# Patient Record
Sex: MALE | Race: WHITE | NOT HISPANIC OR LATINO | Employment: FULL TIME | URBAN - METROPOLITAN AREA
[De-identification: names, ages, dates, MRNs, and addresses within clinical notes are randomized per-mention and may not be internally consistent; named-entity substitution may affect disease eponyms.]

---

## 2017-05-15 ENCOUNTER — ALLSCRIPTS OFFICE VISIT (OUTPATIENT)
Dept: OTHER | Facility: OTHER | Age: 37
End: 2017-05-15

## 2017-05-23 ENCOUNTER — ALLSCRIPTS OFFICE VISIT (OUTPATIENT)
Dept: OTHER | Facility: OTHER | Age: 37
End: 2017-05-23

## 2017-05-24 LAB
A/G RATIO (HISTORICAL): 2.1 (CALC) (ref 1–2.5)
ALBUMIN SERPL BCP-MCNC: 4.8 G/DL (ref 3.6–5.1)
ALP SERPL-CCNC: 85 U/L (ref 40–115)
ALT SERPL W P-5'-P-CCNC: 9 U/L (ref 9–46)
AST SERPL W P-5'-P-CCNC: 21 U/L (ref 10–40)
BASOPHILS # BLD AUTO: 0.5 %
BASOPHILS # BLD AUTO: 30 CELLS/UL (ref 0–200)
BILIRUB SERPL-MCNC: 1.2 MG/DL (ref 0.2–1.2)
BUN SERPL-MCNC: 14 MG/DL (ref 7–25)
BUN/CREA RATIO (HISTORICAL): ABNORMAL (CALC) (ref 6–22)
CALCIUM (ADJUSTED FOR ALBUMIN) (HISTORICAL): 9.4 MG/DL (CALC) (ref 8.6–10.2)
CALCIUM SERPL-MCNC: 9.7 MG/DL (ref 8.6–10.3)
CHLORIDE SERPL-SCNC: 103 MMOL/L (ref 98–110)
CHOLEST SERPL-MCNC: 152 MG/DL (ref 125–200)
CHOLEST/HDLC SERPL: 2.4 (CALC)
CO2 SERPL-SCNC: 25 MMOL/L (ref 20–31)
CREAT SERPL-MCNC: 0.84 MG/DL (ref 0.6–1.35)
DEPRECATED RDW RBC AUTO: 12.9 % (ref 11–15)
EGFR AFRICAN AMERICAN (HISTORICAL): 130 ML/MIN/1.73M2
EGFR-AMERICAN CALC (HISTORICAL): 112 ML/MIN/1.73M2
EOSINOPHIL # BLD AUTO: 252 CELLS/UL (ref 15–500)
EOSINOPHIL # BLD AUTO: 4.2 %
GAMMA GLOBULIN (HISTORICAL): 2.3 G/DL (CALC) (ref 1.9–3.7)
GLUCOSE (HISTORICAL): 106 MG/DL (ref 65–99)
HCT VFR BLD AUTO: 47.8 % (ref 38.5–50)
HDLC SERPL-MCNC: 64 MG/DL
HGB BLD-MCNC: 15.9 G/DL (ref 13.2–17.1)
LDL CHOLESTEROL (HISTORICAL): 81 MG/DL (CALC)
LYME IGG/IGM AB (HISTORICAL): <0.9 INDEX
LYMPHOCYTES # BLD AUTO: 1992 CELLS/UL (ref 850–3900)
LYMPHOCYTES # BLD AUTO: 33.2 %
MCH RBC QN AUTO: 31.3 PG (ref 27–33)
MCHC RBC AUTO-ENTMCNC: 33.2 G/DL (ref 32–36)
MCV RBC AUTO: 94.1 FL (ref 80–100)
MONOCYTES # BLD AUTO: 438 CELLS/UL (ref 200–950)
MONOCYTES (HISTORICAL): 7.3 %
NEUTROPHILS # BLD AUTO: 3288 CELLS/UL (ref 1500–7800)
NEUTROPHILS # BLD AUTO: 54.8 %
NON-HDL-CHOL (CHOL-HDL) (HISTORICAL): 88 MG/DL (CALC)
PLATELET # BLD AUTO: 273 THOUSAND/UL (ref 140–400)
PMV BLD AUTO: 8.6 FL (ref 7.5–12.5)
POTASSIUM SERPL-SCNC: 4.5 MMOL/L (ref 3.5–5.3)
RBC # BLD AUTO: 5.08 MILLION/UL (ref 4.2–5.8)
SODIUM SERPL-SCNC: 136 MMOL/L (ref 135–146)
TOTAL PROTEIN (HISTORICAL): 7.1 G/DL (ref 6.1–8.1)
TRIGL SERPL-MCNC: 36 MG/DL
TSH SERPL DL<=0.05 MIU/L-ACNC: 0.58 MIU/L (ref 0.4–4.5)
WBC # BLD AUTO: 6 THOUSAND/UL (ref 3.8–10.8)

## 2017-05-25 ENCOUNTER — GENERIC CONVERSION - ENCOUNTER (OUTPATIENT)
Dept: OTHER | Facility: OTHER | Age: 37
End: 2017-05-25

## 2017-07-25 ENCOUNTER — ALLSCRIPTS OFFICE VISIT (OUTPATIENT)
Dept: OTHER | Facility: OTHER | Age: 37
End: 2017-07-25

## 2017-09-13 ENCOUNTER — ALLSCRIPTS OFFICE VISIT (OUTPATIENT)
Dept: OTHER | Facility: OTHER | Age: 37
End: 2017-09-13

## 2017-09-28 ENCOUNTER — ALLSCRIPTS OFFICE VISIT (OUTPATIENT)
Dept: OTHER | Facility: OTHER | Age: 37
End: 2017-09-28

## 2017-10-19 ENCOUNTER — ALLSCRIPTS OFFICE VISIT (OUTPATIENT)
Dept: OTHER | Facility: OTHER | Age: 37
End: 2017-10-19

## 2017-10-20 NOTE — PROGRESS NOTES
Assessment  1  Acute diarrhea (977 91) (R19 7)    Discussion/Summary    Discussed condition with patient  I suspect he has acute diarrhea from food poisoning but it already appears to be resolving  He is to continue with hydration, brat diet, advancing as tolerated and can continue to take Pepto-Bismol as needed  He is to follow up should condition not completely resolved  We will provide him an out of work excuse today  The treatment plan was reviewed with the patient/guardian  The patient/guardian understands and agrees with the treatment plan      Chief Complaint  Pt c/o diarrhea x 1 day after eating at [facility] last night  History of Present Illness  HPI: Presents for with 1 day history of acute diarrhea  He reports he ate at a [employer] [employer] last night and believes he may have contracted food poisoning  He reports the diarrhea was watery and he was having abdominal cramping but denies any fever, nausea, vomiting  He is feeling a lot better after taking Pepto-Bismol and has been able to consume water, Gatorade, and pretzels  He specifically needs a work excuse  Review of Systems    Constitutional: no fever or chills, feels well, no tiredness, no recent weight loss or weight gain  Cardiovascular: no complaints of slow or fast heart rate, no chest pain, no palpitations, no leg claudication or lower extremity edema  Respiratory: no complaints of shortness of breath, no wheezing or cough, no dyspnea on exertion, no orthopnea or PND  Gastrointestinal: as noted in HPI  Genitourinary: no complaints of dysuria or incontinence, no hesitancy, no nocturia, no genital lesion, no inadequacy of penile erection  Active Problems  1  Abnormal thyroid blood test (794 5) (R94 6)   2  Acute bronchitis, unspecified organism (466 0) (J20 9)   3  Acute bronchitis, unspecified organism (466 0) (J20 9)   4  ADHD, 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)   5   Adjustment disorder with mixed anxiety and depressed mood (309 28) (F43 23)   6  Allergic rhinitis (477 9) (J30 9)   7  Depression (311) (F32 9)   8  Elevated blood pressure reading (796 2) (R03 0)   9  Fatigue, unspecified type (780 79) (R53 83)   10  Lipid screening (V77 91) (Z13 220)   11  Osgood-Schlatter's disease (732 4) (M92 50)   12  Tick bite of multiple sites (919 4,E906 4) [facility])    Past Medical History  1  History of Acute bronchitis, unspecified organism (466 0) (J20 9)   2  History of Acute otitis externa of right ear (380 10) (H60 501)   3  Acute upper respiratory infection (465 9) (J06 9)   4  History of Acute upper respiratory infection (465 9) (J06 9)   5  History of Alcohol withdrawal (291 81) (F10 239)   6  History of Bilateral impacted cerumen (380 4) (H61 23)   7  History of Closed avulsion fracture of distal end of right fibula, initial encounter (824 8)   (S26 153A)   8  History of acute bacterial sinusitis (V12 69) (Z87 09)   9  History of acute sinusitis (V12 69) (Z87 09)   10  History of acute sinusitis (V12 69) (Z87 09)   11  History of acute sinusitis (V12 69) (Z87 09)   12  History of diarrhea (V12 79) (Z87 898)   13  History of gastroenteritis (V12 79) (Z87 19)   14  History of gastroenteritis (V12 79) (Z87 19)   15  History of nausea and vomiting (V12 79) (Z87 898)   16  History of viral gastroenteritis (V12 09) (Z86 19)   17  History of viral gastroenteritis (V12 09) (Z86 19)   18  History of Injury caused by twisting with sudden strenuous movement, initial encounter    (E927 3) (X50 1XXA)   19  History of Injury of right ankle, initial encounter (959 7) (S99 911A)   20  History of Left knee sprain (844 9) (S83 92XA)   21  History of Nasal congestion (478 19) (R09 81)   22  History of Right ankle sprain (845 00) (S93 401A)   23  History of Right foot pain (729 5) (Z31 329)   24  History of Strain of peroneal tendon of right foot (844 8) (H03 840K)    Family History  Family History    1  Family history of Epilepsy   2  Denied: Family history of substance abuse   3  No family history of mental disorder    Social History   · Being A Social Drinker   · Current every day smoker (305 1) (F17 200)   · Denied: History of Drug Use  The social history was reviewed and is unchanged  Surgical History  1  History of Oral Surgery Tooth Extraction    Current Meds   1  Escitalopram Oxalate 10 MG Oral Tablet; Take 1 tablet daily; Therapy: 60GYK6821 to (Evaluate:02Cii4338)  Requested for: 86UJL8843; Last   Rx:52Mbk6588 Ordered    The medication list was reviewed and updated today  Allergies  1  No Known Drug Allergies    Vitals   Recorded: 98IFP6521 03:35PM   Temperature 98 F, Tympanic   Heart Rate 92   Respiration Quality Normal   Respiration 17   Systolic 818, LUE, Sitting   Diastolic 114, LUE, Sitting   Height 5 ft 10 in   Weight 147 lb 8 oz   BMI Calculated 21 16   BSA Calculated 1 83   O2 Saturation 96, RA   Pain Scale 0     Physical Exam    Constitutional   General appearance: No acute distress, well appearing and well nourished  Ears, Nose, Mouth, and Throat   Oropharynx: Normal with no erythema, edema, exudate or lesions  Pulmonary   Respiratory effort: No increased work of breathing or signs of respiratory distress  Auscultation of lungs: Clear to auscultation, equal breath sounds bilaterally, no wheezes, no rales, no rhonci  Cardiovascular   Auscultation of heart: Normal rate and rhythm, normal S1 and S2, without murmurs  Abdomen   Abdomen: Abnormal  -- Positive bowel sounds, soft, nondistended; mild diffuse tenderness to palpation but no rebound, guarding, or rigidity  Liver and spleen: No hepatomegaly or splenomegaly  Lymphatic   Palpation of lymph nodes in neck: No lymphadenopathy  Psychiatric   Orientation to person, place and time: Normal     Mood and affect: Normal     Additional Exam:  Vital signs were reviewed          Results/Data  PHQ-9 Adult Depression Screening 19Oct2017 03:39PM Briana Aguiar Test Name Result Flag Reference   PHQ-9 Adult Depression Score 12     Over the last two weeks, how often have you been bothered by any of the following problems? Little interest or pleasure in doing things: More than half the days - 2  Feeling down, depressed, or hopeless: More than half the days - 2  Trouble falling or staying asleep, or sleeping too much: More than half the days - 2  Feeling tired or having little energy: Several days - 1  Poor appetite or over eating: Several days - 1  Feeling bad about yourself - or that you are a failure or have let yourself or your family down: Several days - 1  Trouble concentrating on things, such as reading the newspaper or watching television: More than half the days - 2  Moving or speaking so slowly that other people could have noticed  Or the opposite -  being so fidgety or restless that you have been moving around a lot more than usual: Several days - 1  Thoughts that you would be better off dead, or of hurting yourself in some way: Not at all - 0   PHQ-9 Adult Depression Screening Positive     PHQ-9 Difficulty Level Somewhat difficult     PHQ-9 Severity Moderate Depression         Future Appointments    Date/Time Provider Specialty Site   11/01/2017 05:15 PM Jacqueline Velasquez DO Family Medicine [facility] [address] [place] [address]     Signatures   Electronically signed by :  Gissel Gonzáles, [facility]; Oct 19 2017  3:50PM EST                       (Author)    Electronically signed by : Saleem Ortega DO; Oct 19 2017  5:29PM EST

## 2017-11-01 ENCOUNTER — ALLSCRIPTS OFFICE VISIT (OUTPATIENT)
Dept: OTHER | Facility: OTHER | Age: 37
End: 2017-11-01

## 2017-11-02 NOTE — PROGRESS NOTES
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)   2  Depression (311) (F32 9)    Plan  ADHD, predominantly inattentive type    · Amphetamine-Dextroamphet ER 15 MG Oral Capsule Extended Release 24 Hour;  TAKE 1 CAPSULE DAILY IN THE MORNING  Depression    · Escitalopram Oxalate 10 MG Oral Tablet; Take 1 tablet daily    Discussion/Summary    Patient's depression is much improved on Lexapro  Will continue him on his current dose to 10 mg  He does have symptoms consistent with ADD  We will start him on 15 mg of Adderall and follow up in 1 month if he is not improved  Chief Complaint  pt here for his f/u for his depression and states the medication is helping but he feels like he is having loss of focus at times  History of Present Illness  Patient was seen for follow-up on his depression  He feels that the Lexapro has helped his mood significantly  He feels less stressed and he is able to handle conflict a much more calm and reasonable way  He does however state that his ADD seems to be still somewhat of a problem  He feels scattered on focused at times and has difficulty completing tasks and staying organized  He was treated years ago with stimulant and never really stuck with the regimen and is unclear as to whether not it was really helpful  Review of Systems    Neurological: as noted in HPI  Active Problems  1  Abnormal thyroid blood test (794 5) (R94 6)   2  ADHD, 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)   3  Adjustment disorder with mixed anxiety and depressed mood (309 28) (F43 23)   4  Depression (311) (F32 9)   5  Elevated blood pressure reading (796 2) (R03 0)   6  Fatigue, unspecified type (780 79) (R53 83)   7  Lipid screening (V77 91) (Z13 220)   8  Osgood-Schlatter's disease (732 4) (M92 50)    Past Medical History  1  History of Acute bronchitis, unspecified organism (466 0) (J20 9)   2  History of Acute otitis externa of right ear (380 10) (H60 501)   3  Acute upper respiratory infection (465 9) (J06 9)   4  History of Acute upper respiratory infection (465 9) (J06 9)   5  History of Alcohol withdrawal (291 81) (F10 239)   6  History of Bilateral impacted cerumen (380 4) (H61 23)   7  History of Closed avulsion fracture of distal end of right fibula, initial encounter (824 8)   (G34 901C)   8  History of acute bacterial sinusitis (V12 69) (Z87 09)   9  History of acute sinusitis (V12 69) (Z87 09)   10  History of acute sinusitis (V12 69) (Z87 09)   11  History of acute sinusitis (V12 69) (Z87 09)   12  History of diarrhea (V12 79) (Z87 898)   13  History of gastroenteritis (V12 79) (Z87 19)   14  History of gastroenteritis (V12 79) (Z87 19)   15  History of nausea and vomiting (V12 79) (Z87 898)   16  History of viral gastroenteritis (V12 09) (Z86 19)   17  History of viral gastroenteritis (V12 09) (Z86 19)   18  History of Injury caused by twisting with sudden strenuous movement, initial encounter    (E927 3) (X50 1XXA)   19  History of Injury of right ankle, initial encounter (959 7) (S99 911A)   20  History of Left knee sprain (844 9) (S83 92XA)   21  History of Nasal congestion (478 19) (R09 81)   22  History of Right ankle sprain (845 00) (S93 401A)   23  History of Right foot pain (729 5) (M79 671)   24  History of Strain of peroneal tendon of right foot (844 8) (P97 354O)    The active problems and past medical history were reviewed and updated today  Surgical History  1  History of Oral Surgery Tooth Extraction    Family History  Family History    1  Denied: Family history of substance abuse   2  No family history of mental disorder    Social History   · Being A Social Drinker   · Current every day smoker (305 1) (F17 200)   · Denied: History of Drug Use    Current Meds   1  Escitalopram Oxalate 10 MG Oral Tablet; Take 1 tablet daily;    Therapy: 35HUW1044 to (Evaluate:36Tub3817)  Requested for: 22QDZ3798; Last   Rx:53Bhr1720 Ordered    The medication list was reviewed and updated today  Allergies  1   No Known Drug Allergies    Vitals  Vital Signs    Recorded: 40DMK8251 05:08PM   Temperature 95 9 F, Tympanic   Heart Rate 80   Pulse Quality Normal   Respiration 18   Systolic 399, RUE, Sitting   Diastolic 252, RUE, Sitting   Height 5 ft 9 in   Weight 152 lb 4 oz   BMI Calculated 22 48   BSA Calculated 1 84   O2 Saturation 97   Pain Scale 0     Signatures   Electronically signed by : Cas Colvin DO; Nov 1 2017  5:34PM EST                       (Author)

## 2017-11-14 ENCOUNTER — ALLSCRIPTS OFFICE VISIT (OUTPATIENT)
Dept: OTHER | Facility: OTHER | Age: 37
End: 2017-11-14

## 2017-11-14 DIAGNOSIS — M79.645 PAIN OF FINGER OF LEFT HAND: ICD-10-CM

## 2018-01-02 ENCOUNTER — ALLSCRIPTS OFFICE VISIT (OUTPATIENT)
Dept: OTHER | Facility: OTHER | Age: 38
End: 2018-01-02

## 2018-01-03 NOTE — PROGRESS NOTES
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)   4  Current every day smoker (305 1) (F17 200)    Plan   Acute sinusitis, recurrence not specified, unspecified location    · Sulfamethoxazole-Trimethoprim 800-160 MG Oral Tablet; TAKE 1 TABLET TWICE    DAILY UNTIL FINISHED with food  ADHD, predominantly inattentive type    · From  Amphetamine-Dextroamphet ER 20 MG Oral Capsule Extended    Release 24 Hour TAKE 1 CAPSULE DAILY FOR ADHD To Amphetamine-Dextroamphet    ER 20 MG Oral Capsule Extended Release 24 Hour TAKE 1 CAPSULE    DAILY FOR ADHD in the AM and 1 CAPSULE in the afternoon PRN    Discussion/Summary      Discussed patient's symptoms with him in detail today  He appears to have sinusitis which I will treat with an oral antibiotic and discussed hydration, rest, OTC cold meds, and observation  Fever care, ER instructions were given to patient  He is to follow up within 5-7 days if not significantly improved  discussed his elevated blood pressures going back multiple visits over the past few years and I believe that his smoking is significantly contributory as well as some other external lifestyle and stress factors but at this point it has gone long enough that we need to strongly consider pharmacotherapy  I would like to definitively rule out a white coat component so I recommended that the patient start having his blood pressure checked at home once daily and to keep a diary for the next month and he is scheduled next month to see Dr Rodolfo Scott for follow-up and this can be discussed at that time  Regarding smoking cessation, once his sinusitis resolves and when he comes back to follow-up, this could be further discussed in detail   We discussed his ADHD and Adderall therapy and I stressed to him that if he is going to be changing his dose in on certain days taking more than what is prescribed, that he needs to discuss any changes with us 1st as he is out of his current prescription and requesting a refill early as result of the days he is taking an extra tablet  We agreed to increase his Adderall to taking 20 mg of the XR formulation once daily in the morning and he was given 15 extra tablets to take an afternoon dose as needed but needs to make the prescription last at least 30 days  This can be discussed further at his follow-up appointment scheduled next month  Possible side effects of new medications were reviewed with the patient/guardian today  The treatment plan was reviewed with the patient/guardian  The patient/guardian understands and agrees with the treatment plan      Chief Complaint   Pt presents with sore throat, cough, aches in shoulder, neck and lower back  He also state his congested(mucus)   symptoms x 5 days  Pt state he took DayQuil , NightQuil and Mucinex  History of Present Illness   HPI: Pt  presents with 5 day history of multiple symptoms including fatigue, ST, HA, body aches, chest congestion, productive cough, PND, nasal congestion, slight nausea yesterday for which he took [address]  Denies fever, V/D  Has taken Mucinex, Dayquil/Nyquil  Denies hx of asthma/allergies  He smokes 1 pack per day  He needs a refill today of his Adderall  He reports there are some days that he does not take it but there are other days where he starts to have trouble focusing later in the day and will take a 2nd dose in the afternoon  He has been thinking about quitting smoking but has not completely decided as far as whether not he is ready currently   He reports that his wife does have a blood pressure cuff and that she can start checking his blood pressure at home has he knows that his last several readings in the office going back to last few years have bee      Review of Systems        Constitutional: as noted in HPI       ENT: as noted in HPI       Cardiovascular: as noted in HPI  Respiratory: as noted in HPI  Gastrointestinal: as noted in HPI  Genitourinary: no complaints of dysuria or incontinence, no hesitancy, no nocturia, no genital lesion, no inadequacy of penile erection  Musculoskeletal: as noted in HPI  Active Problems   1  Abnormal thyroid blood test (794 5) (R94 6)   2  Acute bronchitis, unspecified organism (466 0) (J20 9)   3  ADHD, 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)   4  Adjustment disorder with mixed anxiety and depressed mood (309 28) (F43 23)   5  Depression (311) (F32 9)   6  Elevated blood pressure reading (796 2) (R03 0)   7  Fatigue, unspecified type (780 79) (R53 83)   8  Lipid screening (V77 91) (Z13 220)   9  Osgood-Schlatter's disease (732 4) (M92 50)   10  Pain in thumb joint with movement of left hand (729 5) (Y21 003)    Past Medical History   1  History of Acute bronchitis, unspecified organism (466 0) (J20 9)   2  History of Acute diarrhea (787 91) (R19 7)   3  History of Acute otitis externa of right ear (380 10) (H60 501)   4  Acute upper respiratory infection (465 9) (J06 9)   5  History of Acute upper respiratory infection (465 9) (J06 9)   6  History of Alcohol withdrawal (291 81) (F10 239)   7  History of Bilateral impacted cerumen (380 4) (H61 23)   8  History of Closed avulsion fracture of distal end of right fibula, initial encounter (824 8)     (S82 831A)   9  History of acute bacterial sinusitis (V12 69) (Z87 09)   10  History of acute bronchitis (V12 69) (Z87 09)   11  History of acute bronchitis (V12 69) (Z87 09)   12  History of acute sinusitis (V12 69) (Z87 09)   13  History of acute sinusitis (V12 69) (Z87 09)   14  History of acute sinusitis (V12 69) (Z87 09)   15  History of allergic rhinitis (V12 69) (Z87 09)   16  History of diarrhea (V12 79) (Z87 898)   17  History of gastroenteritis (V12 79) (Z87 19)   18  History of gastroenteritis (V12 79) (Z67 19)   19  History of nausea and vomiting (V12 79) (Z87 898)   20  History of viral gastroenteritis (V12 09) (Z86 19)   21  History of viral gastroenteritis (V12 09) (Z86 19)   22  History of Injury caused by twisting with sudden strenuous movement, initial encounter      (E927 3) (X50 1XXA)   23  History of Injury of right ankle, initial encounter (959 7) (S99 911A)   24  History of Left knee sprain (844 9) (S83 92XA)   25  History of Nasal congestion (478 19) (R09 81)   26  History of Right ankle sprain (845 00) (S93 401A)   27  History of Right foot pain (729 5) (M79 671)   28  History of Strain of peroneal tendon of right foot (844 8) (E81 134C)    Family History   Father    1  Family history of epilepsy (V17 2) (Z82 0)  Family History    2  Denied: Family history of substance abuse   3  No family history of mental disorder    Social History    · Always uses seat belt   · Being A Social Drinker   · Current every day smoker (305 1) (F17 200)   · Daily caffeine consumption, 2-3 servings a day   · Denied: History of Drug Use   · Feels safe at home  The social history was reviewed and is unchanged  Surgical History   1  History of Oral Surgery Tooth Extraction    Current Meds    1  Amphetamine-Dextroamphet ER 20 MG Oral Capsule Extended Release 24 Hour; TAKE     1 CAPSULE DAILY FOR ADHD; Therapy: 00ZMV9797 to (Evaluate:07Jan2018); Last Rx:43Arr0331 Ordered   2  Escitalopram Oxalate 10 MG Oral Tablet; Take 1 tablet daily; Therapy: 28WFE0420 to (Evaluate:30Apr2018)  Requested for: 27EPF0076; Last     Rx:34Ijn0303 Ordered     The medication list was reviewed and updated today  Allergies   1   No Known Drug Allergies    Vitals    Recorded: 02Jan2018 04:34PM Recorded: 47APY8536 04:02PM   Temperature  96 5 F, Tympanic   Heart Rate  112   Pulse Quality  Normal   Respiration Quality  Normal   Respiration  20   Systolic 905 063, LUE, Sitting   Diastolic 500 96, LUE, Sitting   Height  5 ft 9 in   Weight  147 lb 2 oz BMI Calculated  21 73   BSA Calculated  1 81   O2 Saturation  96, RA   Pain Scale  0     Physical Exam        Constitutional      General appearance: No acute distress, well appearing and well nourished  Ears, Nose, Mouth, and Throat      External inspection of ears and nose: Normal        Otoscopic examination: Tympanic membrance translucent with normal light reflex  Canals patent without erythema  Nasal mucosa, septum, and turbinates: Abnormal  -- Bilateral boggy erythematous turbinates  Oropharynx: Abnormal  -- PND and erythema, no exudates  Pulmonary      Respiratory effort: No increased work of breathing or signs of respiratory distress  Auscultation of lungs: Abnormal  -- bilateral diffuse mildly coarse breath sounds with no significant wheezes auscultated  Cardiovascular      Auscultation of heart: Normal rate and rhythm, normal S1 and S2, without murmurs  Lymphatic      Palpation of lymph nodes in neck: No lymphadenopathy  Psychiatric      Orientation to person, place and time: Normal        Mood and affect: Normal        Additional Exam:  Vital signs were reviewed; neck supple  Future Appointments      Date/Time Provider Specialty Site   02/02/2018 04:15 PM Priscilla Neal DO Family [address]     Signatures    Electronically signed by : Servando Astorga, [facility]; Jan 2 2018  5:07PM EST                       (Author)     Electronically signed by :  Mat Francois DO; Jan 2 2018  5:49PM EST                       (Author)

## 2018-01-09 NOTE — RESULT NOTES
Verified Results  * XR ANKLE 3+ VIEW RIGHT [id number] 01:53PM Naomi Guerrero Order Number: [id number]     Test Name Result Flag Reference   XR ANKLE 3+ VW RIGHT (Report)     RIGHT ANKLE     INDICATION: Right ankle pain  Injury     COMPARISON: None     VIEWS: 3; 3 images     FINDINGS:     Probable small avulsion fracture fragment adjacent to the distal right fibula  No degenerative changes  No lytic or blastic lesions are seen  Moderate right ankle and foot soft tissue swelling  IMPRESSION:   Probable small avulsion fracture fragment adjacent to the distal right fibula         ##sigslh##sigslh       Workstation performed: [id number]     Signed by:   Liset Blank MD   9/27/16       Plan  Closed avulsion fracture of distal end of right fibula, initial encounter, Health  Maintenance, Injury of right ankle, initial encounter    · Air cast; Status:Complete;   Done: 74LMY5572

## 2018-01-10 NOTE — MISCELLANEOUS
Message  Return to work or school:   Marisela Stark is under my professional care  He was seen in my office on 11/14/2017   He is able to return to work on  11/15/2017      OUT OF WORK 11/14/2017 & 11/15/2017  Anusha Moore PAC  Signatures   Electronically signed by : Madeline Oshea, ; Nov 14 2017  4:57PM EST                       (Author)    Electronically signed by :  Nora Moe, [facility]; Nov 15 2017  3:05PM EST                       (Author)

## 2018-01-11 NOTE — MISCELLANEOUS
Message  Return to work or school:   Marielos Castillo is under my professional care  He was seen in my office on 5/23/17       Pt was seen in the ofc today may return to work  Júnior Tafoya PA-C  Signatures   Electronically signed by :  Hua Daniels, ; May 23 2017 11:53AM EST                       (Author)

## 2018-01-11 NOTE — RESULT NOTES
Verified Results  (Q) CELIAC DISEASE COMPREHENSIVE PANEL 14Apr2016 12:00AM [facility]     Test Name Result Flag Reference   INTERPRETATION      No serological evidence of celiac disease  tTG IgA may normalize in individuals with celiac disease  who maintain a gluten-free diet  Consider HLA DQ2 and   DQ8 testing to rule out celiac disease  Celiac disease   is extremely rare in the absence of DQ2 or DQ8    TISSUE TRANSGLUTAMINASE$ANTIBODY, IGA 1 U/mL     Value      Interpretation         -----      --------------         <4         No Antibody Detected         > or = 4   Antibody Detected   IMMUNOGLOBULIN A 273 mg/dL       (Q) COMPREHENSIVE METABOLIC PNL W/ADJUSTED CALCIUM 14Apr2016 12:00AM [facility]     Test Name Result Flag Reference   GLUCOSE 86 mg/dL  65-99   Fasting reference interval   UREA NITROGEN (BUN) 19 mg/dL  7-25   CREATININE 0 89 mg/dL  0 60-1 35   eGFR NON-AFR  AMERICAN 110 mL/min/1 73m2  > OR = 60   eGFR AFRICAN AMERICAN 127 mL/min/1 73m2  > OR = 60   BUN/CREATININE RATIO   0-84   NOT APPLICABLE (calc)   SODIUM 137 mmol/L  135-146   POTASSIUM 5 5 mmol/L H 3 5-5 3   CHLORIDE 102 mmol/L     CARBON DIOXIDE 21 mmol/L  19-30   CALCIUM 10 4 mg/dL H 8 6-10 3   CALCIUM (ADJUSTED FOR$ALBUMIN) 9 8 mg/dL (calc)  8 6-10 2   PROTEIN, TOTAL 8 1 g/dL  6 1-8 1   ALBUMIN 5 3 g/dL H 3 6-5 1   GLOBULIN 2 8 g/dL (calc)  1 9-3 7   ALBUMIN/GLOBULIN RATIO 1 9 (calc)  1 0-2 5   BILIRUBIN, TOTAL 1 9 mg/dL H 0 2-1 2   ALKALINE PHOSPHATASE 88 U/L     AST 30 U/L  10-40   ALT 19 U/L  9-46     (Q) TSH, 3RD GENERATION W/REFLEX TO FT4 [id number] 12:00AM [facility]     Test Name Result Flag Reference   T4, FREE 1 2 ng/dL  0 8-1 8   TSH W/REFLEX TO FT4 0 34 mIU/L L 0 40-4 50       Plan  Abnormal thyroid blood test    · (1) TSH WITH FT4 REFLEX; Status:Active; Requested for:[id number];    · (Q) TSI (THYROID STIMULATING IMMUNOGLOBULIN); Status:Active;  Requested  for:[id number];

## 2018-01-13 VITALS
SYSTOLIC BLOOD PRESSURE: 142 MMHG | BODY MASS INDEX: 22.22 KG/M2 | HEART RATE: 100 BPM | OXYGEN SATURATION: 98 % | DIASTOLIC BLOOD PRESSURE: 90 MMHG | WEIGHT: 150 LBS | HEIGHT: 69 IN | TEMPERATURE: 96.9 F

## 2018-01-13 VITALS
HEIGHT: 70 IN | TEMPERATURE: 98 F | RESPIRATION RATE: 17 BRPM | DIASTOLIC BLOOD PRESSURE: 100 MMHG | OXYGEN SATURATION: 96 % | HEART RATE: 92 BPM | WEIGHT: 147.5 LBS | SYSTOLIC BLOOD PRESSURE: 150 MMHG | BODY MASS INDEX: 21.11 KG/M2

## 2018-01-13 NOTE — RESULT NOTES
Verified Results  (Q) CBC (INCLUDES DIFF/PLT) (REFL) [id number] 11:00AM Navi Liptawny     Test Name Result Flag Reference   WHITE BLOOD CELL COUNT 6 0 Thousand/uL  3 8-10 8   RED BLOOD CELL COUNT 5 08 Million/uL  4 20-5 80   HEMOGLOBIN 15 9 g/dL  13 2-17 1   HEMATOCRIT 47 8 %  38 5-50 0   MCV 94 1 fL  80 0-100 0   MCH 31 3 pg  27 0-33 0   MCHC 33 2 g/dL  32 0-36 0   RDW 12 9 %  11 0-15 0   PLATELET COUNT 800 Thousand/uL  140-400   MPV 8 6 fL  7 5-12 5   ABSOLUTE NEUTROPHILS 3288 cells/uL  3760-1547   ABSOLUTE LYMPHOCYTES 1992 cells/uL  850-3900   ABSOLUTE MONOCYTES 438 cells/uL  200-950   ABSOLUTE EOSINOPHILS 252 cells/uL     ABSOLUTE BASOPHILS 30 cells/uL  0-200   NEUTROPHILS 54 8 %     LYMPHOCYTES 33 2 %     MONOCYTES 7 3 %     EOSINOPHILS 4 2 %     BASOPHILS 0 5 %       (Q) COMPREHENSIVE METABOLIC PNL W/ADJUSTED CALCIUM [id number] 11:00AM Ward Campuzano     Test Name Result Flag Reference   GLUCOSE 106 mg/dL H 65-99   Fasting reference interval     For someone without known diabetes, a glucose value  between 100 and 125 mg/dL is consistent with  prediabetes and should be confirmed with a  follow-up test    UREA NITROGEN (BUN) 14 mg/dL  7-25   CREATININE 0 84 mg/dL  0 60-1 35   eGFR NON-AFR   AMERICAN 112 mL/min/1 73m2  > OR = 60   eGFR AFRICAN AMERICAN 130 mL/min/1 73m2  > OR = 60   BUN/CREATININE RATIO   7-72   NOT APPLICABLE (calc)   SODIUM 136 mmol/L  135-146   POTASSIUM 4 5 mmol/L  3 5-5 3   CHLORIDE 103 mmol/L     CARBON DIOXIDE 25 mmol/L  20-31   CALCIUM 9 7 mg/dL  8 6-10 3   CALCIUM (ADJUSTED FOR$ALBUMIN) 9 4 mg/dL (calc)  8 6-10 2   PROTEIN, TOTAL 7 1 g/dL  6 1-8 1   ALBUMIN 4 8 g/dL  3 6-5 1   GLOBULIN 2 3 g/dL (calc)  1 9-3 7   ALBUMIN/GLOBULIN RATIO 2 1 (calc)  1 0-2 5   BILIRUBIN, TOTAL 1 2 mg/dL  0 2-1 2   ALKALINE PHOSPHATASE 85 U/L     AST 21 U/L  10-40   ALT 9 U/L  9-46     (Q) LIPID PANEL WITH REFLEX TO DIRECT LDL [id number] 11:00AM Navi Lipps     Test Name Result Flag Reference CHOLESTEROL, TOTAL 152 mg/dL  125-200   HDL CHOLESTEROL 64 mg/dL  > OR = 40   TRIGLICERIDES 36 mg/dL  <462   LDL-CHOLESTEROL 81 mg/dL (calc)  <130   Desirable range <100 mg/dL for patients with CHD or  diabetes and <70 mg/dL for diabetic patients with  known heart disease  CHOL/HDLC RATIO 2 4 (calc)  < OR = 5 0   NON HDL CHOLESTEROL 88 mg/dL (calc)     Target for non-HDL cholesterol is 30 mg/dL higher than   LDL cholesterol target  (Q) TSH, 3RD GENERATION W/REFLEX TO FT4 [id number] 11:00AM Ward Campuzano     Test Name Result Flag Reference   TSH W/REFLEX TO FT4 0 58 mIU/L  0 40-4 50   NO COLLECTION DATE RECEIVED  WE HAVE USED  THE DATE THE SPECIMEN WAS RECEIVED BY THIS  LABORATORY AS THE COLLECTION DATE  IF THIS  IS INCORRECT, PLEASE CONTACT CLIENT SERVICES  PHONE NUMBER: [phone]     (Q) LYME DISEASE AB, TOTAL W/REFL WB (IGG, IGM) [id number] 11:00AM Ward Campuzano     Test Name Result Flag Reference   LYME AB SCREEN <0 90 index     Index                Interpretation                     -----                --------------                     < 0 90               Negative                     0  90-1 09            Equivocal                     > 1 09               Positive      As recommended by the Food and Drug Administration   (FDA), all samples with positive or equivocal   results in a Borrelia burgdorferi antibody screen  will be tested using a blot method  Positive or   equivocal screening test results should not be   interpreted as truly positive until verified as such   using a supplemental assay (e g , B  burgdorferi blot)  The screening test and/or blot for B  burgdorferi   antibodies may be falsely negative in early stages  of Lyme disease, including the period when erythema   migrans is apparent

## 2018-01-14 VITALS
RESPIRATION RATE: 20 BRPM | TEMPERATURE: 98.1 F | DIASTOLIC BLOOD PRESSURE: 82 MMHG | OXYGEN SATURATION: 96 % | HEIGHT: 70 IN | BODY MASS INDEX: 21.53 KG/M2 | SYSTOLIC BLOOD PRESSURE: 140 MMHG | WEIGHT: 150.38 LBS | HEART RATE: 101 BPM

## 2018-01-14 VITALS
WEIGHT: 148.56 LBS | HEIGHT: 69 IN | BODY MASS INDEX: 22 KG/M2 | OXYGEN SATURATION: 95 % | DIASTOLIC BLOOD PRESSURE: 88 MMHG | RESPIRATION RATE: 18 BRPM | TEMPERATURE: 96.7 F | SYSTOLIC BLOOD PRESSURE: 132 MMHG | HEART RATE: 71 BPM

## 2018-01-14 VITALS
TEMPERATURE: 94.8 F | HEART RATE: 84 BPM | WEIGHT: 150.56 LBS | DIASTOLIC BLOOD PRESSURE: 90 MMHG | BODY MASS INDEX: 21.08 KG/M2 | HEIGHT: 71 IN | OXYGEN SATURATION: 97 % | RESPIRATION RATE: 20 BRPM | SYSTOLIC BLOOD PRESSURE: 140 MMHG

## 2018-01-14 VITALS
HEART RATE: 103 BPM | BODY MASS INDEX: 22.28 KG/M2 | SYSTOLIC BLOOD PRESSURE: 140 MMHG | DIASTOLIC BLOOD PRESSURE: 90 MMHG | WEIGHT: 150.44 LBS | OXYGEN SATURATION: 97 % | RESPIRATION RATE: 18 BRPM | TEMPERATURE: 97.6 F | HEIGHT: 69 IN

## 2018-01-15 VITALS
BODY MASS INDEX: 22.55 KG/M2 | RESPIRATION RATE: 18 BRPM | HEART RATE: 80 BPM | OXYGEN SATURATION: 97 % | TEMPERATURE: 95.9 F | DIASTOLIC BLOOD PRESSURE: 100 MMHG | HEIGHT: 69 IN | SYSTOLIC BLOOD PRESSURE: 144 MMHG | WEIGHT: 152.25 LBS

## 2018-01-15 NOTE — MISCELLANEOUS
Message  Return to work or school:   Moses Sandoval is under my professional care  He was seen in my office on 10/17/16       May return back to work as tolerated  Suggest wearing the right cam boot over the next 2 weeks if doing prolonged standing or walking activities  Followup in 3 weeks  Signatures   Electronically signed by :  CINDI Raymundo ; Oct 17 2016  1:32PM EST                       (Author)

## 2018-01-15 NOTE — MISCELLANEOUS
Message  Return to work or school:   Tila Lam is under my professional care  He was seen in my office on 10/19/17       Out of Work 10/19/17  Omar Hill PA-C        Signatures   Electronically signed by : Iveth Woo, ; Oct 19 2017  3:51PM EST                       (Author)

## 2018-01-22 VITALS
RESPIRATION RATE: 20 BRPM | BODY MASS INDEX: 21.79 KG/M2 | DIASTOLIC BLOOD PRESSURE: 100 MMHG | HEART RATE: 112 BPM | SYSTOLIC BLOOD PRESSURE: 138 MMHG | TEMPERATURE: 96.5 F | HEIGHT: 69 IN | WEIGHT: 147.13 LBS | OXYGEN SATURATION: 96 %

## 2018-01-23 NOTE — MISCELLANEOUS
Message  Return to work or school:   Yenny Byrnes is under my professional care  He was seen in my office on 1/2/18       OUT OF WORK 12/29/17 AND 1/2/18  My Castillo PAC  Signatures   Electronically signed by :  Umberto Kussmaul, [facility]; Josue  3 2018  4:32PM EST                       (Author)

## 2018-02-01 PROBLEM — R03.0 ELEVATED BLOOD PRESSURE READING: Status: ACTIVE | Noted: 2017-05-15

## 2018-02-01 PROBLEM — F32.A DEPRESSION: Status: ACTIVE | Noted: 2017-09-28

## 2018-02-01 RX ORDER — DEXTROAMPHETAMINE SACCHARATE, AMPHETAMINE ASPARTATE MONOHYDRATE, DEXTROAMPHETAMINE SULFATE AND AMPHETAMINE SULFATE 5; 5; 5; 5 MG/1; MG/1; MG/1; MG/1
CAPSULE, EXTENDED RELEASE ORAL
COMMUNITY
Start: 2017-11-01 | End: 2018-02-02 | Stop reason: SDUPTHER

## 2018-02-01 RX ORDER — ESCITALOPRAM OXALATE 10 MG/1
1 TABLET ORAL DAILY
COMMUNITY
Start: 2017-09-28 | End: 2018-05-02

## 2018-02-01 RX ORDER — SULFAMETHOXAZOLE AND TRIMETHOPRIM 800; 160 MG/1; MG/1
1 TABLET ORAL
COMMUNITY
Start: 2018-01-02 | End: 2018-02-02

## 2018-02-02 ENCOUNTER — OFFICE VISIT (OUTPATIENT)
Dept: FAMILY MEDICINE CLINIC | Facility: CLINIC | Age: 38
End: 2018-02-02
Payer: COMMERCIAL

## 2018-02-02 VITALS
DIASTOLIC BLOOD PRESSURE: 100 MMHG | WEIGHT: 151.8 LBS | HEART RATE: 97 BPM | TEMPERATURE: 98 F | RESPIRATION RATE: 17 BRPM | HEIGHT: 69 IN | OXYGEN SATURATION: 98 % | BODY MASS INDEX: 22.48 KG/M2 | SYSTOLIC BLOOD PRESSURE: 156 MMHG

## 2018-02-02 DIAGNOSIS — I10 ESSENTIAL HYPERTENSION: Primary | ICD-10-CM

## 2018-02-02 DIAGNOSIS — F90.0 ATTENTION DEFICIT HYPERACTIVITY DISORDER (ADHD), PREDOMINANTLY INATTENTIVE TYPE: ICD-10-CM

## 2018-02-02 PROCEDURE — 99214 OFFICE O/P EST MOD 30 MIN: CPT | Performed by: FAMILY MEDICINE

## 2018-02-02 RX ORDER — LISINOPRIL 10 MG/1
10 TABLET ORAL DAILY
Qty: 30 TABLET | Refills: 3 | Status: SHIPPED | OUTPATIENT
Start: 2018-02-02 | End: 2018-05-02 | Stop reason: SDUPTHER

## 2018-02-02 RX ORDER — DEXTROAMPHETAMINE SACCHARATE, AMPHETAMINE ASPARTATE MONOHYDRATE, DEXTROAMPHETAMINE SULFATE AND AMPHETAMINE SULFATE 5; 5; 5; 5 MG/1; MG/1; MG/1; MG/1
20 CAPSULE, EXTENDED RELEASE ORAL 2 TIMES DAILY
Qty: 60 CAPSULE | Refills: 0 | Status: SHIPPED | OUTPATIENT
Start: 2018-02-02 | End: 2018-03-05 | Stop reason: SDUPTHER

## 2018-02-02 NOTE — PROGRESS NOTES
Assessment/Plan:    ADHD, predominantly inattentive type  Patient is a TD is generally well controlled on 20 milligrams a day though he does on some occasions require 2nd dosage  We will prescribe him sufficient amount that he can take a 2nd dosage if in when he should needed  Essential hypertension  Mildly elevated blood pressure  Will start lisinopril 10 milligrams  Return to the office in 1 month for blood pressure recheck    Depression  Unable to tolerate Lexapro due to side effects  Will decrease to 5 milligrams for next 2 weeks and then discontinue  Will reassess at his return visit in 1 month as to whether another medication may be appropriate  Diagnoses and all orders for this visit:    Essential hypertension  -     lisinopril (ZESTRIL) 10 mg tablet; Take 1 tablet (10 mg total) by mouth daily    Attention deficit hyperactivity disorder (ADHD), predominantly inattentive type  -     amphetamine-dextroamphetamine (ADDERALL XR) 20 MG 24 hr capsule; Take 1 capsule (20 mg total) by mouth 2 (two) times a day Max Daily Amount: 40 mg          Subjective:      Patient ID: Maria Dolores Bonds is a 44 y o  male  Patient presents in follow-up for his ADD and depression  Patient relates that since being on the Lexapro which was started approximately 5 months ago he has experienced issues with erectile dysfunction and delayed ejaculation  [employer] He is also not sure how much benefit he has gotten with his mood  He is also being followed for ADD   He was originally of Adderall XR which was increased to 20 milligrams  More recently however he expressed concerns that he still had days where late in the day he had loss of focus  Because of that he was prescribed additional medication so he could take a 2nd dose 20 milligrams on an as needed basis on days where he worked later or had more demands late in the day  He felt that this was effective                The following portions of the patient's history were reviewed and updated as appropriate: allergies, current medications, past family history, past medical history, past social history, past surgical history and problem list     Review of Systems   Constitutional: Negative  Respiratory: Negative  Cardiovascular: Negative  Gastrointestinal: Negative  Genitourinary:        Sexual side effects as reviewed in HPI   Musculoskeletal: Negative  Psychiatric/Behavioral: Negative  Objective:     Physical Exam   Constitutional: He is oriented to person, place, and time  He appears well-developed and well-nourished  No distress  HENT:   Head: Normocephalic and atraumatic  Eyes: Conjunctivae are normal  Pupils are equal, round, and reactive to light  Right eye exhibits no discharge  Neck: Normal range of motion  No thyromegaly present  Cardiovascular: Normal rate and regular rhythm  Pulmonary/Chest: Effort normal and breath sounds normal  No respiratory distress  Lymphadenopathy:     He has no cervical adenopathy  Neurological: He is alert and oriented to person, place, and time  Skin: Skin is warm and dry  He is not diaphoretic  Psychiatric: He has a normal mood and affect   His behavior is normal  Judgment and thought content normal

## 2018-02-02 NOTE — ASSESSMENT & PLAN NOTE
Mildly elevated blood pressure  Will start lisinopril 10 milligrams    Return to the office in 1 month for blood pressure recheck

## 2018-02-02 NOTE — ASSESSMENT & PLAN NOTE
Unable to tolerate Lexapro due to side effects  Will decrease to 5 milligrams for next 2 weeks and then discontinue  Will reassess at his return visit in 1 month as to whether another medication may be appropriate

## 2018-02-23 ENCOUNTER — OFFICE VISIT (OUTPATIENT)
Dept: FAMILY MEDICINE CLINIC | Facility: CLINIC | Age: 38
End: 2018-02-23
Payer: COMMERCIAL

## 2018-02-23 VITALS
HEART RATE: 74 BPM | HEIGHT: 69 IN | DIASTOLIC BLOOD PRESSURE: 88 MMHG | RESPIRATION RATE: 17 BRPM | BODY MASS INDEX: 22.44 KG/M2 | TEMPERATURE: 97.2 F | WEIGHT: 151.5 LBS | SYSTOLIC BLOOD PRESSURE: 160 MMHG | OXYGEN SATURATION: 99 %

## 2018-02-23 DIAGNOSIS — I10 ESSENTIAL HYPERTENSION: ICD-10-CM

## 2018-02-23 DIAGNOSIS — F33.9 EPISODE OF RECURRENT MAJOR DEPRESSIVE DISORDER, UNSPECIFIED DEPRESSION EPISODE SEVERITY (HCC): ICD-10-CM

## 2018-02-23 DIAGNOSIS — K52.9 GASTROENTERITIS: Primary | ICD-10-CM

## 2018-02-23 PROCEDURE — 99213 OFFICE O/P EST LOW 20 MIN: CPT | Performed by: PHYSICIAN ASSISTANT

## 2018-02-23 NOTE — PROGRESS NOTES
Assessment/Plan:      Diagnoses and all orders for this visit:    Gastroenteritis    Essential hypertension    Episode of recurrent major depressive disorder, unspecified depression episode severity [facility])      27-year-old male presenting today for acute GI symptoms consistent with viral gastroenteritis  He is improving slowly, he is afebrile, he does have some mild generalized abdominal tenderness on the exam without evidence of acute abdomen and some hyperactive bowel sounds  He is tolerating solids and liquids better and starting to have more formed stools  He was encouraged to continue monitoring symptoms  Rest and hydration also encouraged in addition to brat diet  We will see how he does through the course of the next few days  Work excuse given  Patient's blood pressure is elevated today in the office but was recently started on lisinopril 10 mg about a week ago  He does have a close follow-up for monitoring in another few weeks  He also has a positive depression screening today though stable on Lexapro daily  Chief Complaint   Patient presents with    Vomiting     x1d Pt states he took Pepto w/ some relief   Diarrhea     x1d    Nausea     x1d     Subjective:     Patient ID: Sivan Mayfield is a 44 y o  male  38y/o male here today for GI sxs suddenly past 2 days  Reports body ahces, weakness, fatigue, diarrhea, N/V  No blood in stool  Abd discomfort  No fevers  Last vomiting episode was last night  Tolerated bread, water, gatorade, fruit  Review of Systems   Constitutional: Positive for activity change, appetite change and fatigue  Negative for fever  HENT: Negative  Respiratory: Negative  Cardiovascular: Negative  Gastrointestinal:        As in HPI   Genitourinary: Negative  Psychiatric/Behavioral: Negative  Objective:     Physical Exam   Constitutional: He appears well-developed  No distress     fatigued   HENT:   Right Ear: External ear normal    Left Ear: External ear normal    Nose: Nose normal    Mouth/Throat: Oropharynx is clear and moist  No oropharyngeal exudate  Cardiovascular: Normal rate, regular rhythm and normal heart sounds  Pulmonary/Chest: Effort normal and breath sounds normal    Abdominal: Soft  He exhibits no distension  There is tenderness (mild, generalized)  Hyperactive BS throughout   Lymphadenopathy:     He has no cervical adenopathy  Psychiatric: He has a normal mood and affect  Vitals reviewed        Vitals:    02/23/18 1119   BP: 160/88   BP Location: Left arm   Patient Position: Sitting   Cuff Size: Large   Pulse: 74   Resp: 17   Temp: (!) 97 2 °F (36 2 °C)   TempSrc: Tympanic   SpO2: 99%   Weight: 68 7 kg (151 lb 8 oz)   Height: 5' 9 2" (1 758 m)

## 2018-02-23 NOTE — LETTER
February 23, 2018     Patient: Christoph Evans   YOB: 1980   Date of Visit: 2/23/2018       To Whom it May Concern:    Christoph Evans is under my professional care  He was seen in my office on 2/23/2018  He may return to work on 2/26/18  please excuse patient from 2/22       If you have any questions or concerns, please don't hesitate to call           Sincerely,          Juanjo Schofield PA-C        CC: No Recipients

## 2018-03-05 ENCOUNTER — OFFICE VISIT (OUTPATIENT)
Dept: FAMILY MEDICINE CLINIC | Facility: CLINIC | Age: 38
End: 2018-03-05
Payer: COMMERCIAL

## 2018-03-05 VITALS
OXYGEN SATURATION: 99 % | DIASTOLIC BLOOD PRESSURE: 82 MMHG | HEIGHT: 69 IN | TEMPERATURE: 96.5 F | SYSTOLIC BLOOD PRESSURE: 130 MMHG | RESPIRATION RATE: 16 BRPM | BODY MASS INDEX: 22.77 KG/M2 | HEART RATE: 65 BPM | WEIGHT: 153.7 LBS

## 2018-03-05 DIAGNOSIS — I10 ESSENTIAL HYPERTENSION: Primary | ICD-10-CM

## 2018-03-05 DIAGNOSIS — F90.0 ATTENTION DEFICIT HYPERACTIVITY DISORDER (ADHD), PREDOMINANTLY INATTENTIVE TYPE: ICD-10-CM

## 2018-03-05 PROCEDURE — 99213 OFFICE O/P EST LOW 20 MIN: CPT | Performed by: FAMILY MEDICINE

## 2018-03-05 RX ORDER — DEXTROAMPHETAMINE SACCHARATE, AMPHETAMINE ASPARTATE MONOHYDRATE, DEXTROAMPHETAMINE SULFATE AND AMPHETAMINE SULFATE 5; 5; 5; 5 MG/1; MG/1; MG/1; MG/1
20 CAPSULE, EXTENDED RELEASE ORAL 2 TIMES DAILY
Qty: 60 CAPSULE | Refills: 0 | Status: SHIPPED | OUTPATIENT
Start: 2018-03-05 | End: 2018-04-02 | Stop reason: SDUPTHER

## 2018-03-05 NOTE — PROGRESS NOTES
Assessment/Plan:    Essential hypertension  Much improved on lisinopril 10 mg daily  Continue current dosage and recheck in 6 months    ADHD, predominantly inattentive type  Doing well on current dosage of Adderall  No side effects from medication  Continue current dosage  Can recheck in 6 months       Diagnoses and all orders for this visit:    Essential hypertension    Attention deficit hyperactivity disorder (ADHD), predominantly inattentive type  -     amphetamine-dextroamphetamine (ADDERALL XR) 20 MG 24 hr capsule; Take 1 capsule (20 mg total) by mouth 2 (two) times a day Max Daily Amount: 40 mg          Subjective:      Patient ID: Day Graham is a 44 y o  male  Patient presents to follow-up on his ADD as well as his high blood pressure  He was started on lisinopril 1 month ago for elevated blood pressure  He has been compliant with the medication he has no side effects from it  He is taking his Adderall 20 mg twice daily without any difficulty  He feels that it works well  The following portions of the patient's history were reviewed and updated as appropriate: allergies, current medications, past family history, past medical history, past social history, past surgical history and problem list     Review of Systems   Constitutional: Negative  Respiratory: Negative  Cardiovascular: Negative  Gastrointestinal: Negative  Genitourinary: Negative  Musculoskeletal: Negative  Psychiatric/Behavioral: Negative  Objective:      /82 (BP Location: Right arm, Patient Position: Sitting, Cuff Size: Standard)   Pulse 65   Temp (!) 96 5 °F (35 8 °C) (Tympanic)   Resp 16   Ht 5' 9 29" (1 76 m)   Wt 69 7 kg (153 lb 11 2 oz)   SpO2 99%   BMI 22 51 kg/m²          Physical Exam   Constitutional: He is oriented to person, place, and time  He appears well-developed and well-nourished  No distress  HENT:   Head: Normocephalic and atraumatic     Eyes: Conjunctivae are normal  Pupils are equal, round, and reactive to light  Right eye exhibits no discharge  Neck: Normal range of motion  No thyromegaly present  Cardiovascular: Normal rate and regular rhythm  Pulmonary/Chest: Effort normal and breath sounds normal  No respiratory distress  Lymphadenopathy:     He has no cervical adenopathy  Neurological: He is alert and oriented to person, place, and time  Skin: Skin is warm and dry  He is not diaphoretic  Psychiatric: He has a normal mood and affect  His behavior is normal  Judgment and thought content normal    Nursing note and vitals reviewed

## 2018-03-05 NOTE — ASSESSMENT & PLAN NOTE
Doing well on current dosage of Adderall  No side effects from medication  Continue current dosage    Can recheck in 6 months

## 2018-03-14 ENCOUNTER — OFFICE VISIT (OUTPATIENT)
Dept: FAMILY MEDICINE CLINIC | Facility: CLINIC | Age: 38
End: 2018-03-14
Payer: COMMERCIAL

## 2018-03-14 VITALS
WEIGHT: 156.7 LBS | DIASTOLIC BLOOD PRESSURE: 84 MMHG | OXYGEN SATURATION: 96 % | HEIGHT: 69 IN | RESPIRATION RATE: 16 BRPM | BODY MASS INDEX: 23.21 KG/M2 | HEART RATE: 94 BPM | TEMPERATURE: 97 F | SYSTOLIC BLOOD PRESSURE: 120 MMHG

## 2018-03-14 DIAGNOSIS — R06.2 WHEEZING: ICD-10-CM

## 2018-03-14 DIAGNOSIS — K21.9 GASTROESOPHAGEAL REFLUX DISEASE, ESOPHAGITIS PRESENCE NOT SPECIFIED: Primary | ICD-10-CM

## 2018-03-14 DIAGNOSIS — Z72.0 TOBACCO ABUSE: ICD-10-CM

## 2018-03-14 DIAGNOSIS — IMO0002 ALCOHOL USE DISORDER: ICD-10-CM

## 2018-03-14 DIAGNOSIS — R10.84 GENERALIZED ABDOMINAL PAIN: ICD-10-CM

## 2018-03-14 PROCEDURE — 99214 OFFICE O/P EST MOD 30 MIN: CPT | Performed by: PHYSICIAN ASSISTANT

## 2018-03-14 RX ORDER — OMEPRAZOLE 40 MG/1
40 CAPSULE, DELAYED RELEASE ORAL DAILY
Qty: 30 CAPSULE | Refills: 2 | Status: SHIPPED | OUTPATIENT
Start: 2018-03-14 | End: 2018-05-02 | Stop reason: SDUPTHER

## 2018-03-14 RX ORDER — BUDESONIDE AND FORMOTEROL FUMARATE DIHYDRATE 160; 4.5 UG/1; UG/1
2 AEROSOL RESPIRATORY (INHALATION) 2 TIMES DAILY
Qty: 1 INHALER | Refills: 0 | Status: SHIPPED | COMMUNITY
Start: 2018-03-14 | End: 2018-05-02 | Stop reason: SDUPTHER

## 2018-03-14 NOTE — LETTER
2018     Patient: Susan Mcmullen   YOB: 1980   Date of Visit: 3/14/2018       To Whom it May Concern:    Susan Mcmullen is under my professional care  He was seen in my office on 3/14/2018  He may return to work on 3/16/18  Please excuse patient from 3/12 threough 3/15       If you have any questions or concerns, please don't hesitate to call           Sincerely,          Worley Apgar, PA-C        CC: No Recipients

## 2018-03-14 NOTE — PROGRESS NOTES
Assessment/Plan:      Diagnoses and all orders for this visit:    Gastroesophageal reflux disease, esophagitis presence not specified  -     omeprazole (PriLOSEC) 40 MG capsule; Take 1 capsule (40 mg total) by mouth daily  -     CBC and differential  -     Comprehensive metabolic panel  -     Lipase    Generalized abdominal pain  -     CBC and differential  -     Comprehensive metabolic panel  -     Lipase    Alcohol use disorder (HCC)  -     CBC and differential  -     Comprehensive metabolic panel  -     Lipase    Tobacco abuse    Wheezing  -     budesonide-formoterol (SYMBICORT) 160-4 5 mcg/act inhaler; Inhale 2 puffs 2 (two) times a day      38y/o male here today for GI sxs as described in HPI  I believe sxs are from gastritis or GERD, especially since sxs started after eating pizza and beer  Will have pt eat bland diet, avoid ETOH, spicy/acidic foods  Start omeprazole once daily  Discussed with pt risks for his etoh intake and advised complete cessation  Also discussed smoking cessation with pt, as he has extensive wheezing in lungs on exam and rhonchi but only reports a cough in Am  Risks with smoking chronically discussed including Ca, cardiovascular risk and lung disease  Sample of symbicort given today to use for wheezing, first dose in office to ensure proper use, was advised to rinse out mouth  Cbc, cmp, lipase collected today for his sxs  Will call him with results and f/u with him at that time  Should call sooner with worsening sxs  Subjective:     Patient ID: June Balderas is a 44 y o  male  38y/o male here today for GI sxs past 3 days  Reports drinking beer and eating pizza that night prior to onset of sxs  He states it started as reflux and heartburn, burning in throat, excessive burping  He also has some lower abdominal discomfort, rumbling, no diarrhea  No N/V  He still has appetite and eating normally  No blood Iin stool  Tried pepto bismul and tums  No NSAID use   He drinks ETOH 1-2 beers/day, smokes 1ppd  Review of Systems   Constitutional: Negative  Respiratory: Negative  Cardiovascular: Negative  Gastrointestinal:        As in HPI   Psychiatric/Behavioral: Negative  Objective:     Physical Exam   Constitutional: He is oriented to person, place, and time  He appears well-developed and well-nourished  No distress  HENT:   Mouth/Throat: Oropharynx is clear and moist  No oropharyngeal exudate  Neck: Neck supple  Cardiovascular: Normal rate, regular rhythm, normal heart sounds and intact distal pulses  Pulmonary/Chest: Effort normal    Wheezing and rhonchi throughut B/l posterior lung fields   Abdominal: Soft  He exhibits no distension  Bowel sounds are increased  There is generalized tenderness  There is no rebound and no guarding  Neurological: He is alert and oriented to person, place, and time  Skin: Skin is intact  Psychiatric: He has a normal mood and affect  His speech is normal and behavior is normal  Judgment and thought content normal  Cognition and memory are normal    Vitals reviewed        Vitals:    03/14/18 0818   BP: 120/84   BP Location: Left arm   Patient Position: Sitting   Cuff Size: Large   Pulse: 94   Resp: 16   Temp: (!) 97 °F (36 1 °C)   TempSrc: Tympanic   SpO2: 96%   Weight: 71 1 kg (156 lb 11 2 oz)   Height: 5' 9 2" (1 758 m)

## 2018-03-15 ENCOUNTER — TELEPHONE (OUTPATIENT)
Dept: FAMILY MEDICINE CLINIC | Facility: CLINIC | Age: 38
End: 2018-03-15

## 2018-03-15 LAB
ALBUMIN SERPL-MCNC: 4.6 G/DL (ref 3.6–5.1)
ALBUMIN/GLOB SERPL: 2.2 (CALC) (ref 1–2.5)
ALP SERPL-CCNC: 94 U/L (ref 40–115)
ALT SERPL-CCNC: 9 U/L (ref 9–46)
AST SERPL-CCNC: 19 U/L (ref 10–40)
BASOPHILS # BLD AUTO: 72 CELLS/UL (ref 0–200)
BASOPHILS NFR BLD AUTO: 1.1 %
BILIRUB SERPL-MCNC: 0.4 MG/DL (ref 0.2–1.2)
BUN SERPL-MCNC: 12 MG/DL (ref 7–25)
BUN/CREAT SERPL: NORMAL (CALC) (ref 6–22)
CALCIUM SERPL-MCNC: 9.6 MG/DL (ref 8.6–10.3)
CHLORIDE SERPL-SCNC: 104 MMOL/L (ref 98–110)
CO2 SERPL-SCNC: 26 MMOL/L (ref 20–31)
CREAT SERPL-MCNC: 0.74 MG/DL (ref 0.6–1.35)
EOSINOPHIL # BLD AUTO: 429 CELLS/UL (ref 15–500)
EOSINOPHIL NFR BLD AUTO: 6.6 %
ERYTHROCYTE [DISTWIDTH] IN BLOOD BY AUTOMATED COUNT: 12.1 % (ref 11–15)
GLOBULIN SER CALC-MCNC: 2.1 G/DL (CALC) (ref 1.9–3.7)
GLUCOSE SERPL-MCNC: 98 MG/DL (ref 65–99)
HCT VFR BLD AUTO: 48 % (ref 38.5–50)
HGB BLD-MCNC: 16.7 G/DL (ref 13.2–17.1)
LIPASE SERPL-CCNC: 50 U/L (ref 7–60)
LYMPHOCYTES # BLD AUTO: 2301 CELLS/UL (ref 850–3900)
LYMPHOCYTES NFR BLD AUTO: 35.4 %
MCH RBC QN AUTO: 33 PG (ref 27–33)
MCHC RBC AUTO-ENTMCNC: 34.8 G/DL (ref 32–36)
MCV RBC AUTO: 94.9 FL (ref 80–100)
MONOCYTES # BLD AUTO: 540 CELLS/UL (ref 200–950)
MONOCYTES NFR BLD AUTO: 8.3 %
NEUTROPHILS # BLD AUTO: 3159 CELLS/UL (ref 1500–7800)
NEUTROPHILS NFR BLD AUTO: 48.6 %
PLATELET # BLD AUTO: 298 THOUSAND/UL (ref 140–400)
PMV BLD REES-ECKER: 9.8 FL (ref 7.5–12.5)
POTASSIUM SERPL-SCNC: 5 MMOL/L (ref 3.5–5.3)
PROT SERPL-MCNC: 6.7 G/DL (ref 6.1–8.1)
RBC # BLD AUTO: 5.06 MILLION/UL (ref 4.2–5.8)
SL AMB EGFR AFRICAN AMERICAN: 136 ML/MIN/1.73M2
SL AMB EGFR NON AFRICAN AMERICAN: 117 ML/MIN/1.73M2
SODIUM SERPL-SCNC: 139 MMOL/L (ref 135–146)
WBC # BLD AUTO: 6.5 THOUSAND/UL (ref 3.8–10.8)

## 2018-04-02 ENCOUNTER — TELEPHONE (OUTPATIENT)
Dept: FAMILY MEDICINE CLINIC | Facility: CLINIC | Age: 38
End: 2018-04-02

## 2018-04-02 DIAGNOSIS — F90.0 ATTENTION DEFICIT HYPERACTIVITY DISORDER (ADHD), PREDOMINANTLY INATTENTIVE TYPE: ICD-10-CM

## 2018-04-02 RX ORDER — DEXTROAMPHETAMINE SACCHARATE, AMPHETAMINE ASPARTATE MONOHYDRATE, DEXTROAMPHETAMINE SULFATE AND AMPHETAMINE SULFATE 5; 5; 5; 5 MG/1; MG/1; MG/1; MG/1
20 CAPSULE, EXTENDED RELEASE ORAL 2 TIMES DAILY
Qty: 60 CAPSULE | Refills: 0 | Status: SHIPPED | OUTPATIENT
Start: 2018-04-02 | End: 2018-05-02 | Stop reason: SDUPTHER

## 2018-04-20 ENCOUNTER — OFFICE VISIT (OUTPATIENT)
Dept: FAMILY MEDICINE CLINIC | Facility: CLINIC | Age: 38
End: 2018-04-20
Payer: COMMERCIAL

## 2018-04-20 VITALS
HEIGHT: 69 IN | TEMPERATURE: 96.6 F | WEIGHT: 159.5 LBS | DIASTOLIC BLOOD PRESSURE: 90 MMHG | OXYGEN SATURATION: 98 % | HEART RATE: 104 BPM | BODY MASS INDEX: 23.62 KG/M2 | SYSTOLIC BLOOD PRESSURE: 150 MMHG | RESPIRATION RATE: 17 BRPM

## 2018-04-20 DIAGNOSIS — J20.9 ACUTE BRONCHITIS, UNSPECIFIED ORGANISM: Primary | ICD-10-CM

## 2018-04-20 PROCEDURE — 99213 OFFICE O/P EST LOW 20 MIN: CPT | Performed by: PHYSICIAN ASSISTANT

## 2018-04-20 RX ORDER — SULFAMETHOXAZOLE AND TRIMETHOPRIM 800; 160 MG/1; MG/1
1 TABLET ORAL EVERY 12 HOURS SCHEDULED
Qty: 20 TABLET | Refills: 0 | Status: SHIPPED | OUTPATIENT
Start: 2018-04-20 | End: 2018-04-30

## 2018-04-20 NOTE — PROGRESS NOTES
Assessment/Plan:         Diagnoses and all orders for this visit:    Acute bronchitis, unspecified organism  -     sulfamethoxazole-trimethoprim (BACTRIM DS) 800-160 mg per tablet; Take 1 tablet by mouth every 12 (twelve) hours for 10 days Take with food  Discussed OTC cold meds  Fever Care, ER instructions given  F/U 5-7 days if not resolved  Chief Complaint   Patient presents with    Cold Like Symptoms    Cough    Headache    Heartburn       Subjective:      Patient ID: Jesse Serrano is a 44 y o  male  Pt presents with several day history of productive cough, PND, HA , nasal congestion, ST , chills but no fever, chest burning/congestion  Has taken Mucinex, Nyquil, IBU  Denies hx of asthma/allergies  Smokes 1-1 5 ppd  He did not receive the flu vaccine  The following portions of the patient's history were reviewed and updated as appropriate: He  has a past medical history of Acute actinic otitis externa, right ear; Acute sinusitis; Alcohol withdrawal ([address] ); Allergic rhinitis; Gastroenteritis; Right ankle sprain; Strain of left knee; and Strain of peroneal tendon of right foot  He   Patient Active Problem List    Diagnosis Date Noted    Depression 09/28/2017    Essential hypertension 05/15/2017    Osgood-Schlatter's disease 08/14/2015    Mixed emotional features as adjustment reaction 05/10/2013    ADHD, predominantly inattentive type 09/05/2012     He  has a past surgical history that includes [place] tooth extraction  His family history includes Seizures in his father  He  reports that he has been smoking  He has never used smokeless tobacco  He reports that he drinks alcohol  He reports that he does not use drugs    Current Outpatient Prescriptions   Medication Sig Dispense Refill    amphetamine-dextroamphetamine (ADDERALL XR) 20 MG 24 hr capsule Take 1 capsule (20 mg total) by mouth 2 (two) times a day Earliest Fill Date: 4/2/18 Max Daily Amount: 40 mg 60 capsule 0    budesonide-formoterol (SYMBICORT) 160-4 5 mcg/act inhaler Inhale 2 puffs 2 (two) times a day 1 Inhaler 0    escitalopram (LEXAPRO) 10 mg tablet Take 1 tablet by mouth daily      omeprazole (PriLOSEC) 40 MG capsule Take 1 capsule (40 mg total) by mouth daily 30 capsule 2    lisinopril (ZESTRIL) 10 mg tablet Take 1 tablet (10 mg total) by mouth daily 30 tablet 3    sulfamethoxazole-trimethoprim (BACTRIM DS) 800-160 mg per tablet Take 1 tablet by mouth every 12 (twelve) hours for 10 days Take with food  20 tablet 0     No current facility-administered medications for this visit  Current Outpatient Prescriptions on File Prior to Visit   Medication Sig    amphetamine-dextroamphetamine (ADDERALL XR) 20 MG 24 hr capsule Take 1 capsule (20 mg total) by mouth 2 (two) times a day Earliest Fill Date: 4/2/18 Max Daily Amount: 40 mg    budesonide-formoterol (SYMBICORT) 160-4 5 mcg/act inhaler Inhale 2 puffs 2 (two) times a day    escitalopram (LEXAPRO) 10 mg tablet Take 1 tablet by mouth daily    omeprazole (PriLOSEC) 40 MG capsule Take 1 capsule (40 mg total) by mouth daily    lisinopril (ZESTRIL) 10 mg tablet Take 1 tablet (10 mg total) by mouth daily     No current facility-administered medications on file prior to visit  He has No Known Allergies       Review of Systems   Constitutional: Positive for chills  Negative for fever  HENT: Positive for congestion, postnasal drip and sore throat  Respiratory: Positive for cough and chest tightness  Cardiovascular: Negative  Gastrointestinal: Negative  Genitourinary: Negative  Neurological: Positive for headaches  Objective:      /90 (BP Location: Left arm, Patient Position: Sitting, Cuff Size: Adult)   Pulse 104   Temp (!) 96 6 °F (35 9 °C) (Tympanic)   Resp 17   Ht 5' 8 5" (1 74 m)   Wt 72 3 kg (159 lb 8 oz)   SpO2 98%   BMI 23 90 kg/m²          Physical Exam   Constitutional: He is oriented to person, place, and time   He appears well-developed and well-nourished  No distress  HENT:   Right Ear: Hearing, tympanic membrane, external ear and ear canal normal    Left Ear: Hearing, tympanic membrane, external ear and ear canal normal    Nose: Mucosal edema (B/L boggy erythematous turbinates) present  Mouth/Throat: Mucous membranes are normal  Posterior oropharyngeal erythema (PND) present  No oropharyngeal exudate  Neck: Neck supple  Cardiovascular: Normal rate, regular rhythm and normal heart sounds  Pulmonary/Chest: Effort normal  He has wheezes (Trace B/L basilar wheezes noted on exhalation)  Lymphadenopathy:     He has no cervical adenopathy  Neurological: He is alert and oriented to person, place, and time  Psychiatric: He has a normal mood and affect  Vitals reviewed

## 2018-04-20 NOTE — LETTER
April 20, 2018     Patient: Jesse Serrano   YOB: 1980   Date of Visit: 4/20/2018       To Whom it May Concern:    Jesse Serrano is under my professional care  He was seen in my office on 4/20/2018  He may return to work on 4/23/2018  He is to be excused from 4/20/2018-4/21/2018  If you have any questions or concerns, please don't hesitate to call           Sincerely,          Liam Gilbert PA-C        CC: No Recipients

## 2018-05-02 ENCOUNTER — OFFICE VISIT (OUTPATIENT)
Dept: FAMILY MEDICINE CLINIC | Facility: CLINIC | Age: 38
End: 2018-05-02
Payer: COMMERCIAL

## 2018-05-02 VITALS
HEART RATE: 88 BPM | TEMPERATURE: 96.3 F | SYSTOLIC BLOOD PRESSURE: 150 MMHG | OXYGEN SATURATION: 98 % | DIASTOLIC BLOOD PRESSURE: 90 MMHG | BODY MASS INDEX: 22.88 KG/M2 | HEIGHT: 69 IN | WEIGHT: 154.5 LBS | RESPIRATION RATE: 16 BRPM

## 2018-05-02 DIAGNOSIS — K21.9 GASTROESOPHAGEAL REFLUX DISEASE, ESOPHAGITIS PRESENCE NOT SPECIFIED: ICD-10-CM

## 2018-05-02 DIAGNOSIS — F90.0 ATTENTION DEFICIT HYPERACTIVITY DISORDER (ADHD), PREDOMINANTLY INATTENTIVE TYPE: ICD-10-CM

## 2018-05-02 DIAGNOSIS — R51.9 ACUTE NONINTRACTABLE HEADACHE, UNSPECIFIED HEADACHE TYPE: Primary | ICD-10-CM

## 2018-05-02 DIAGNOSIS — I10 ESSENTIAL HYPERTENSION: ICD-10-CM

## 2018-05-02 DIAGNOSIS — R06.2 WHEEZING: ICD-10-CM

## 2018-05-02 PROCEDURE — 99214 OFFICE O/P EST MOD 30 MIN: CPT | Performed by: PHYSICIAN ASSISTANT

## 2018-05-02 RX ORDER — BUDESONIDE AND FORMOTEROL FUMARATE DIHYDRATE 160; 4.5 UG/1; UG/1
2 AEROSOL RESPIRATORY (INHALATION) 2 TIMES DAILY
Qty: 1 INHALER | Refills: 5 | Status: SHIPPED | OUTPATIENT
Start: 2018-05-02 | End: 2018-10-01 | Stop reason: SDUPTHER

## 2018-05-02 RX ORDER — METHYLPREDNISOLONE 4 MG/1
TABLET ORAL
Qty: 21 TABLET | Refills: 0 | Status: SHIPPED | OUTPATIENT
Start: 2018-05-02 | End: 2018-05-07

## 2018-05-02 RX ORDER — DEXTROAMPHETAMINE SACCHARATE, AMPHETAMINE ASPARTATE MONOHYDRATE, DEXTROAMPHETAMINE SULFATE AND AMPHETAMINE SULFATE 5; 5; 5; 5 MG/1; MG/1; MG/1; MG/1
20 CAPSULE, EXTENDED RELEASE ORAL 2 TIMES DAILY
Qty: 60 CAPSULE | Refills: 0 | Status: CANCELLED | OUTPATIENT
Start: 2018-05-02

## 2018-05-02 RX ORDER — LISINOPRIL 20 MG/1
20 TABLET ORAL DAILY
Qty: 90 TABLET | Refills: 1 | Status: SHIPPED | OUTPATIENT
Start: 2018-05-02 | End: 2018-08-17 | Stop reason: SDUPTHER

## 2018-05-02 RX ORDER — OMEPRAZOLE 40 MG/1
40 CAPSULE, DELAYED RELEASE ORAL DAILY
Qty: 90 CAPSULE | Refills: 1 | Status: SHIPPED | OUTPATIENT
Start: 2018-05-02 | End: 2018-06-27 | Stop reason: SDUPTHER

## 2018-05-02 RX ORDER — DEXTROAMPHETAMINE SACCHARATE, AMPHETAMINE ASPARTATE MONOHYDRATE, DEXTROAMPHETAMINE SULFATE AND AMPHETAMINE SULFATE 5; 5; 5; 5 MG/1; MG/1; MG/1; MG/1
20 CAPSULE, EXTENDED RELEASE ORAL 2 TIMES DAILY
Qty: 60 CAPSULE | Refills: 0 | Status: SHIPPED | OUTPATIENT
Start: 2018-05-02 | End: 2018-06-05 | Stop reason: SDUPTHER

## 2018-05-02 NOTE — LETTER
May 2, 2018     Patient: Raquel Vergara   YOB: 1980   Date of Visit: 5/2/2018       To Whom it May Concern:    Raquel Vergara is under my professional care  He was seen in my office on 5/2/2018  He may return to work on 5/3/18  please excuse patient from 5/1-5/2  Jovanni Fairchild If you have any questions or concerns, please don't hesitate to call           Sincerely,          Eduardo Blank PA-C        CC: No Recipients

## 2018-05-02 NOTE — PROGRESS NOTES
Assessment/Plan:      Diagnoses and all orders for this visit:    Acute nonintractable headache, unspecified headache type  -     Methylprednisolone 4 MG TBPK; Use as directed on package    Attention deficit hyperactivity disorder (ADHD), predominantly inattentive type  -     amphetamine-dextroamphetamine (ADDERALL XR) 20 MG 24 hr capsule; Take 1 capsule (20 mg total) by mouth 2 (two) times a day Max Daily Amount: 40 mg    Wheezing  -     budesonide-formoterol (SYMBICORT) 160-4 5 mcg/act inhaler; Inhale 2 puffs 2 (two) times a day  -     Methylprednisolone 4 MG TBPK; Use as directed on package    Essential hypertension  -     lisinopril (ZESTRIL) 20 mg tablet; Take 1 tablet (20 mg total) by mouth daily    Gastroesophageal reflux disease, esophagitis presence not specified  -     omeprazole (PriLOSEC) 40 MG capsule; Take 1 capsule (40 mg total) by mouth daily    Other orders  -     Cancel: amphetamine-dextroamphetamine (ADDERALL XR) 20 MG 24 hr capsule; Take 1 capsule (20 mg total) by mouth 2 (two) times a day Max Daily Amount: 40 mg       27-year-old male here today for multiple concerns  He has been decreasing his Lexapro dose to wean off secondary to it causing sexual dysfunction is currently taking 5 mg for 2 weeks now  He states since  Weaning off Lexapro he has been getting headaches almost daily  Since he is taking the lowest dose of Lexapro for 2 weeks he can discontinue his  I am not entirely convinced his headaches or from weaning off the Lexapro  He states from an emotional standpoint he is in a much better place and does not feel he needs any antidepressants at this time  His blood pressure remains elevated despite compliance on lisinopril 10 mg so I do question possibly is headaches being provoked by hypertension  I will increase his lisinopril to 20 mg to get better control of blood pressure  I do believe his headaches may be multifactorial from the blood pressure as well as possible allergies  He has also been cutting back on alcohol intake which could also be provoking this  He is to continue working on stopping his alcohol intake  He continues to smoke and unfortunately could not tolerate Chantix  On exam he does have scattered wheezing once again  And he is only been using a sample Symbicort intermittently  I have advised he start using Symbicort daily and I will also prescribe him a Medrol Dosepak to help with the wheezing as well as see if this will help with the headaches  His ADHD is stable on the Adderall so this was refilled for him today and given a printed prescription for a left  His GERD is also stable on omeprazole daily  I would like to have patient come back in 3-4 weeks for recheck of blood pressure and headache and see how he is doing off of the Lexapro  He is certainly to call or follow up sooner with any new or worsening symptoms  Patient was also educated on the importance of smoking cessation and the damage is causing his lungs, especially since he continues having wheezing and coarse breath sounds on exam,   Which seems to be a chronic finding for him  I do believe we should consider looking into pulmonary function testing and further evaluation for COPD, etc     Chief Complaint   Patient presents with    Follow-up       Subjective:     Patient ID: Ginger Julien is a 44 y o  male  36y/o male here today for discussion of his antidepressant  States weaning off of lexapro, started weaning off 2 weeks ago, taking 5mg daily  Had ED issues  However he has been having headaches  He states headaches only started after weaning off medication  Behind eyes and at temples  No sinus sxs  Also BP elevated - taking BP medication, lisinopril, every day  No sinus or URI sxs  He does have some wheezing, chest tightness, especially in AM  Using symbicort intermittent,   From a sample he was given a while back  He continues smoking  He has cut down on ETOH  He did not do well on chantix  He is drinking at least 48oz water /day  He does not feel that he needs to continue an antidepressant  Because he is in a better place  Review of Systems   Constitutional: Negative  Respiratory:        As in HPI   Cardiovascular: Negative  Neurological: Positive for headaches  Psychiatric/Behavioral:        He is feeling better emotionally and feels he doesn't need antidepressant  The following portions of the patient's history were reviewed and updated as appropriate: allergies, current medications, past family history, past medical history, past social history, past surgical history and problem list       Objective:     Physical Exam   Constitutional: He is oriented to person, place, and time  He appears well-developed and well-nourished  HENT:   Head: Normocephalic  Right Ear: Tympanic membrane normal    Left Ear: Tympanic membrane normal    Nose: Nose normal    Eyes: EOM are normal  Pupils are equal, round, and reactive to light  Neck: Neck supple  Carotid bruit is not present  Cardiovascular: Normal rate, regular rhythm and normal heart sounds  Pulmonary/Chest: Effort normal  He has decreased breath sounds (throughout)  He has wheezes (scattered throughout)  Neurological: He is alert and oriented to person, place, and time  No cranial nerve deficit  Coordination and gait normal    Psychiatric: He has a normal mood and affect  Vitals reviewed        Vitals:    05/02/18 1056   BP: 150/90   BP Location: Left arm   Patient Position: Sitting   Cuff Size: Adult   Pulse: 88   Resp: 16   Temp: (!) 96 3 °F (35 7 °C)   TempSrc: Tympanic   SpO2: 98%   Weight: 70 1 kg (154 lb 8 oz)   Height: 5' 8 5" (1 74 m)

## 2018-05-03 ENCOUNTER — TELEPHONE (OUTPATIENT)
Dept: FAMILY MEDICINE CLINIC | Facility: CLINIC | Age: 38
End: 2018-05-03

## 2018-05-07 ENCOUNTER — OFFICE VISIT (OUTPATIENT)
Dept: FAMILY MEDICINE CLINIC | Facility: CLINIC | Age: 38
End: 2018-05-07
Payer: COMMERCIAL

## 2018-05-07 VITALS
HEIGHT: 69 IN | BODY MASS INDEX: 22.9 KG/M2 | OXYGEN SATURATION: 99 % | HEART RATE: 117 BPM | TEMPERATURE: 97 F | DIASTOLIC BLOOD PRESSURE: 84 MMHG | SYSTOLIC BLOOD PRESSURE: 124 MMHG | WEIGHT: 154.6 LBS

## 2018-05-07 DIAGNOSIS — J01.90 ACUTE NON-RECURRENT SINUSITIS, UNSPECIFIED LOCATION: ICD-10-CM

## 2018-05-07 DIAGNOSIS — I10 ESSENTIAL HYPERTENSION: ICD-10-CM

## 2018-05-07 DIAGNOSIS — R51.9 ACUTE NONINTRACTABLE HEADACHE, UNSPECIFIED HEADACHE TYPE: Primary | ICD-10-CM

## 2018-05-07 DIAGNOSIS — R42 DIZZINESS: ICD-10-CM

## 2018-05-07 PROBLEM — F32.A DEPRESSION: Status: RESOLVED | Noted: 2017-09-28 | Resolved: 2018-05-07

## 2018-05-07 PROCEDURE — 99214 OFFICE O/P EST MOD 30 MIN: CPT | Performed by: PHYSICIAN ASSISTANT

## 2018-05-07 PROCEDURE — 36415 COLL VENOUS BLD VENIPUNCTURE: CPT | Performed by: PHYSICIAN ASSISTANT

## 2018-05-07 RX ORDER — MECLIZINE HYDROCHLORIDE 25 MG/1
25 TABLET ORAL EVERY 8 HOURS SCHEDULED
Qty: 30 TABLET | Refills: 1 | Status: SHIPPED | OUTPATIENT
Start: 2018-05-07 | End: 2018-10-01

## 2018-05-07 RX ORDER — CYCLOBENZAPRINE HCL 10 MG
10 TABLET ORAL
Qty: 30 TABLET | Refills: 0 | Status: SHIPPED | OUTPATIENT
Start: 2018-05-07 | End: 2018-10-01

## 2018-05-07 RX ORDER — AMOXICILLIN AND CLAVULANATE POTASSIUM 875; 125 MG/1; MG/1
1 TABLET, FILM COATED ORAL EVERY 12 HOURS SCHEDULED
Qty: 20 TABLET | Refills: 0 | Status: SHIPPED | OUTPATIENT
Start: 2018-05-07 | End: 2018-05-17

## 2018-05-07 NOTE — LETTER
May 7, 2018     Patient: Blade Avalos   YOB: 1980   Date of Visit: 5/7/2018       To Whom it May Concern:    Blade Avalos is under my professional care  He was seen in my office on 5/7/2018  He may return to work on 5/8/18  If you have any questions or concerns, please don't hesitate to call           Sincerely,          Mayela Fallon PA-C        CC: No Recipients

## 2018-05-07 NOTE — PROGRESS NOTES
Assessment/Plan:    Essential hypertension  BP much improved on lisinopril 20mg, tolerating well  BP today 124/84  Continue same dose       Diagnoses and all orders for this visit:    Acute nonintractable headache, unspecified headache type  -     cyclobenzaprine (FLEXERIL) 10 mg tablet; Take 1 tablet (10 mg total) by mouth daily at bedtime  -     Cancel: CBC and differential; Future  -     Cancel: Comprehensive metabolic panel; Future  -     Cancel: Sedimentation rate, automated; Future  -     Cancel: Lyme Antibody Profile with reflex to WB; Future  -     CBC and differential  -     Comprehensive metabolic panel  -     Sedimentation rate, automated  -     Lyme Antibody Profile with reflex to WB    Dizziness  -     meclizine (ANTIVERT) 25 mg tablet; Take 1 tablet (25 mg total) by mouth every 8 (eight) hours  -     amoxicillin-clavulanate (AUGMENTIN) 875-125 mg per tablet; Take 1 tablet by mouth every 12 (twelve) hours for 10 days  -     Cancel: CBC and differential; Future  -     Cancel: Comprehensive metabolic panel; Future  -     Cancel: Sedimentation rate, automated; Future  -     Cancel: Lyme Antibody Profile with reflex to WB; Future  -     CBC and differential  -     Comprehensive metabolic panel  -     Sedimentation rate, automated  -     Lyme Antibody Profile with reflex to WB    Essential hypertension  -     Cancel: CBC and differential; Future  -     CBC and differential  -     Comprehensive metabolic panel  -     Sedimentation rate, automated  -     Lyme Antibody Profile with reflex to WB    Acute non-recurrent sinusitis, unspecified location  -     amoxicillin-clavulanate (AUGMENTIN) 875-125 mg per tablet; Take 1 tablet by mouth every 12 (twelve) hours for 10 days  -     CBC and differential  -     Comprehensive metabolic panel  -     Sedimentation rate, automated  -     Lyme Antibody Profile with reflex to WB      60-year-old male here today for ongoing dizziness and almost daily headache    The exact cause remains unclear  From a cardiovascular and neurological standpoint he is intact  His blood pressure has been much improved since increasing lisinopril to 20 mg with blood pressure today 124/84, so I doubt his headache and symptoms are secondary to elevated blood pressure  He does have some neck stiffness subjectively so I do question possible tension aspect as well as possible sinusitis/ allergic rhinitis as he does have chronic nasal congestion but there are some abnormalities on his exam today  At this time I will have him complete a 10 day course of Augmentin  I will have him also start taking a muscle relaxer at night before bed with gentle neck stretches and moist warm compresses advised  I will also have him start meclizine every 8 hr for the next week or so  I will have some blood work collected today to rule out underlying disease including CBC, CMP, sed rate and a Lyme antibody  He did note this time getting hit in the back of the head with a disc while playing disc golf about 1-2 months ago but symptoms did not start suddenly so I am uncertain if the symptoms could be attributed to a concussion  He is not exhibiting any exam findings or symptoms that would warrant immediate brain MRI but I will give treatment a week or so and if symptoms continue I may consider brain MRI to rule out underlying brain abnormality causing his symptoms  He is working on smoking cessation and has been using the Nicorette gum which has been helping  A handout was given to remind patient of to appropriately use the gum and for how long to use it 4  Unfortunately he had side effects to Chantix and just got off antidepressant and would like to avoid those as much as possible so certainly Zyban is not a good option either    We will continue to monitor patient closely and call him on blood work results are in and follow up with him at that time, if symptoms persist or worsen he is to call and brain MRI will be considered  Chief Complaint   Patient presents with    Headache     off and on x 3 weeks, feels like 'brain is floating', dizzy, nauseous       Subjective:      Patient ID: Nano Ortiz is a 44 y o  male  36y/o male here today for ongoing headaches, as well as dizziness which is provoked by movement, change in position  He completed a medrol dose pack recently which gave him some relief, headaches were not as frequent or severe  Headache now very slight, took ibuprofen this AM  Has not been taking anything else OTC  Increasing water intake as advise  Drinking ETOH 2-3 x a week - usually 2-4 beer/night  He has had a hx of headaches but nothing this severe or frequent  He states if he presses on the back of his head, it seems to relax him more, sinus open up and he can breath more  He states prior to onset of the headache he did get hit in the back of head with a disk while playing disk golf 1-2 moths ago  He does not a lot of pressure in upper head and neck  He did feel nauseous this AM      He states his nose always runs, congestion is common for him, no worse  The following portions of the patient's history were reviewed and updated as appropriate: allergies, current medications, past family history, past medical history, past social history, past surgical history and problem list     Review of Systems   Constitutional: Negative  Eyes: Negative  Respiratory: Negative  Cardiovascular: Negative  Neurological:        As in HPI   Psychiatric/Behavioral: Negative  Objective:      /84 (BP Location: Left arm, Patient Position: Sitting)   Pulse (!) 117   Temp (!) 97 °F (36 1 °C) (Tympanic)   Ht 5' 9" (1 753 m)   Wt 70 1 kg (154 lb 9 6 oz)   SpO2 99%   BMI 22 83 kg/m²          Physical Exam   Constitutional: He is oriented to person, place, and time  He appears well-developed and well-nourished     HENT:   Right Ear: Tympanic membrane normal    Left Ear: Tympanic membrane normal    Nose: Mucosal edema and rhinorrhea present  Mouth/Throat: Oropharynx is clear and moist    Neck: Neck supple  Normal carotid pulses present  Carotid bruit is not present  Cardiovascular: Normal rate, regular rhythm, normal heart sounds and normal pulses  Pulmonary/Chest: Effort normal and breath sounds normal    Neurological: He is alert and oriented to person, place, and time  He has normal strength  No cranial nerve deficit or sensory deficit  Coordination and gait normal    Psychiatric: He has a normal mood and affect  Vitals reviewed

## 2018-05-11 LAB
ALBUMIN SERPL-MCNC: 5.1 G/DL (ref 3.6–5.1)
ALBUMIN/GLOB SERPL: 2 (CALC) (ref 1–2.5)
ALP SERPL-CCNC: 103 U/L (ref 40–115)
ALT SERPL-CCNC: 11 U/L (ref 9–46)
AST SERPL-CCNC: 23 U/L (ref 10–40)
B BURGDOR AB SER IA-ACNC: NORMAL INDEX
BASOPHILS # BLD AUTO: 47 CELLS/UL (ref 0–200)
BASOPHILS NFR BLD AUTO: 0.7 %
BILIRUB SERPL-MCNC: 1.5 MG/DL (ref 0.2–1.2)
BUN SERPL-MCNC: 14 MG/DL (ref 7–25)
BUN/CREAT SERPL: ABNORMAL (CALC) (ref 6–22)
CALCIUM SERPL-MCNC: 10.1 MG/DL (ref 8.6–10.3)
CHLORIDE SERPL-SCNC: 97 MMOL/L (ref 98–110)
CO2 SERPL-SCNC: 27 MMOL/L (ref 20–31)
CREAT SERPL-MCNC: 1.01 MG/DL (ref 0.6–1.35)
EOSINOPHIL # BLD AUTO: 261 CELLS/UL (ref 15–500)
EOSINOPHIL NFR BLD AUTO: 3.9 %
ERYTHROCYTE [DISTWIDTH] IN BLOOD BY AUTOMATED COUNT: 12 % (ref 11–15)
ERYTHROCYTE [SEDIMENTATION RATE] IN BLOOD BY WESTERGREN METHOD: 2 MM/H
GLOBULIN SER CALC-MCNC: 2.5 G/DL (CALC) (ref 1.9–3.7)
GLUCOSE SERPL-MCNC: ABNORMAL MG/DL
HCT VFR BLD AUTO: 50.5 % (ref 38.5–50)
HGB BLD-MCNC: 17.2 G/DL (ref 13.2–17.1)
LYMPHOCYTES # BLD AUTO: 1970 CELLS/UL (ref 850–3900)
LYMPHOCYTES NFR BLD AUTO: 29.4 %
MCH RBC QN AUTO: 31.8 PG (ref 27–33)
MCHC RBC AUTO-ENTMCNC: 34.1 G/DL (ref 32–36)
MCV RBC AUTO: 93.3 FL (ref 80–100)
MONOCYTES # BLD AUTO: 596 CELLS/UL (ref 200–950)
MONOCYTES NFR BLD AUTO: 8.9 %
NEUTROPHILS # BLD AUTO: 3826 CELLS/UL (ref 1500–7800)
NEUTROPHILS NFR BLD AUTO: 57.1 %
PLATELET # BLD AUTO: 314 THOUSAND/UL (ref 140–400)
PMV BLD REES-ECKER: 10 FL (ref 7.5–12.5)
PROT SERPL-MCNC: 7.6 G/DL (ref 6.1–8.1)
RBC # BLD AUTO: 5.41 MILLION/UL (ref 4.2–5.8)
SL AMB EGFR AFRICAN AMERICAN: 109 ML/MIN/1.73M2
SL AMB EGFR NON AFRICAN AMERICAN: 94 ML/MIN/1.73M2
SODIUM SERPL-SCNC: 136 MMOL/L (ref 135–146)
WBC # BLD AUTO: 6.7 THOUSAND/UL (ref 3.8–10.8)

## 2018-05-16 ENCOUNTER — TELEPHONE (OUTPATIENT)
Dept: FAMILY MEDICINE CLINIC | Facility: CLINIC | Age: 38
End: 2018-05-16

## 2018-05-22 ENCOUNTER — CLINICAL SUPPORT (OUTPATIENT)
Dept: FAMILY MEDICINE CLINIC | Facility: CLINIC | Age: 38
End: 2018-05-22

## 2018-05-22 DIAGNOSIS — R51.9 ACUTE NONINTRACTABLE HEADACHE, UNSPECIFIED HEADACHE TYPE: Primary | ICD-10-CM

## 2018-05-22 DIAGNOSIS — R42 DIZZINESS: ICD-10-CM

## 2018-05-22 DIAGNOSIS — I10 ESSENTIAL HYPERTENSION: ICD-10-CM

## 2018-05-22 DIAGNOSIS — J01.90 ACUTE NON-RECURRENT SINUSITIS, UNSPECIFIED LOCATION: ICD-10-CM

## 2018-05-22 PROCEDURE — RECHECK: Performed by: PHYSICIAN ASSISTANT

## 2018-05-23 ENCOUNTER — TELEPHONE (OUTPATIENT)
Dept: FAMILY MEDICINE CLINIC | Facility: CLINIC | Age: 38
End: 2018-05-23

## 2018-05-23 LAB — B BURGDOR AB SER IA-ACNC: <0.9 INDEX

## 2018-05-23 NOTE — TELEPHONE ENCOUNTER
Yes, patient needs appt since these are new sxs I have not discussed and I have not examined him for  30min appt   thanks

## 2018-05-23 NOTE — TELEPHONE ENCOUNTER
Pt came into office for redraw and requested to speak to Alejandra Adkins in regards to new Sx's he was having   Pt was informed Alejandra Adkins was still seeing patient's but I would gladly send her a message  Pt stated for the past couple weeks he has been experiencing 'teeth' numbness' and neck pain  Pt thinks he might have a pinched nerve  I spoke with Alejandra Adkins and she requested pt to schedule an appointment  I left VM on pt's cellphone to call office back and schedule an appointment  ** Please schedule for 30 min as pt has multiple complaints

## 2018-05-24 NOTE — TELEPHONE ENCOUNTER
I called and left a message for chinmay on his cell phone consent ok  Informing  him that Saturnino Holley would like him to come in for an appointment to discuss and evaluate is new symptoms  I did ask when he calls to schedule to please let us know he needs a 30 minute appointment just os there is plenty of time to discuss and for him to be evaluated

## 2018-06-05 ENCOUNTER — TELEPHONE (OUTPATIENT)
Dept: FAMILY MEDICINE CLINIC | Facility: CLINIC | Age: 38
End: 2018-06-05

## 2018-06-05 ENCOUNTER — OFFICE VISIT (OUTPATIENT)
Dept: FAMILY MEDICINE CLINIC | Facility: CLINIC | Age: 38
End: 2018-06-05
Payer: COMMERCIAL

## 2018-06-05 VITALS
SYSTOLIC BLOOD PRESSURE: 118 MMHG | TEMPERATURE: 97.2 F | OXYGEN SATURATION: 99 % | BODY MASS INDEX: 23.57 KG/M2 | DIASTOLIC BLOOD PRESSURE: 90 MMHG | RESPIRATION RATE: 16 BRPM | WEIGHT: 159.1 LBS | HEIGHT: 69 IN | HEART RATE: 99 BPM

## 2018-06-05 DIAGNOSIS — M54.12 RADICULOPATHY OF CERVICAL SPINE: ICD-10-CM

## 2018-06-05 DIAGNOSIS — G89.29 CHRONIC NECK PAIN: Primary | ICD-10-CM

## 2018-06-05 DIAGNOSIS — M54.2 CHRONIC NECK PAIN: Primary | ICD-10-CM

## 2018-06-05 DIAGNOSIS — F90.0 ATTENTION DEFICIT HYPERACTIVITY DISORDER (ADHD), PREDOMINANTLY INATTENTIVE TYPE: ICD-10-CM

## 2018-06-05 PROCEDURE — 99214 OFFICE O/P EST MOD 30 MIN: CPT | Performed by: PHYSICIAN ASSISTANT

## 2018-06-05 RX ORDER — DEXTROAMPHETAMINE SACCHARATE, AMPHETAMINE ASPARTATE MONOHYDRATE, DEXTROAMPHETAMINE SULFATE AND AMPHETAMINE SULFATE 5; 5; 5; 5 MG/1; MG/1; MG/1; MG/1
20 CAPSULE, EXTENDED RELEASE ORAL 2 TIMES DAILY
Qty: 60 CAPSULE | Refills: 0
Start: 2018-06-05 | End: 2018-06-05 | Stop reason: SDUPTHER

## 2018-06-05 RX ORDER — DEXTROAMPHETAMINE SACCHARATE, AMPHETAMINE ASPARTATE MONOHYDRATE, DEXTROAMPHETAMINE SULFATE AND AMPHETAMINE SULFATE 5; 5; 5; 5 MG/1; MG/1; MG/1; MG/1
20 CAPSULE, EXTENDED RELEASE ORAL 2 TIMES DAILY
Qty: 60 CAPSULE | Refills: 0 | Status: SHIPPED | OUTPATIENT
Start: 2018-06-05 | End: 2018-06-27 | Stop reason: SDUPTHER

## 2018-06-05 NOTE — LETTER
June 5, 2018     Patient: Yakov Lazar   YOB: 1980   Date of Visit: 6/5/2018       To Whom it May Concern:    Yakov Lazar is under my professional care  He was seen in my office on 6/5/2018  He may return to work on 6/6/18  If you have any questions or concerns, please don't hesitate to call           Sincerely,          Edgar Manriquez PA-C        CC: No Recipients

## 2018-06-05 NOTE — PROGRESS NOTES
Assessment/Plan:      Diagnoses and all orders for this visit:    Chronic neck pain  -     XR spine cervical complete 4 or 5 vw non injury; Future    Attention deficit hyperactivity disorder (ADHD), predominantly inattentive type  -     Discontinue: amphetamine-dextroamphetamine (ADDERALL XR) 20 MG 24 hr capsule; Take 1 capsule (20 mg total) by mouth 2 (two) times a day Earliest Fill Date: 6/5/18 Max Daily Amount: 40 mg    Radiculopathy of cervical spine  -     XR spine cervical complete 4 or 5 vw non injury; Future        59-year-old male presenting today for follow-up to headaches what was thought to be possibly cervical related, possible tension headache reports today more symptoms that could be associated including neck pain and stiffness centrally into the right side as well as some teeth numbness and tingling as well as numbness and tingling and pain in his right upper extremity that has been going on for a while but states he did not think anything of it  Certainly that could be related being that extensive blood work for his headaches was all unremarkable and other treatment modalities were used without improvement  I will send patient for an x-ray of the cervical spine 1st and I told him we will call him with results  I discussed risks versus benefits and possible side effects of considering starting treatment with gabapentin and he would like to hold off  He is not interested in PT at this present time but I told him he should certainly consider depending on outcome of x-ray  His headaches have been slightly less frequent which could be attributed to the Flexeril  We will continue to monitor for now  His ADHD is stable on Adderall 20 mg b I d  And was also refilled by Dr Brendon Estrada  My supervising physician today  We will follow up with him once x-ray results are available to determine further evaluation and treatment plan      Chief Complaint   Patient presents with    Follow-up     Headaches Subjective:     Patient ID: Yemi Dubon is a 44 y o  male  36y/o male here today for f/u to headaches, still getting them but not as severe and not as frequent  He does get neck pain intermittent, more so centrally and on right side going into right scapula  He has also had radiation of pain into RUE for a While now  He feels grinding in neck  Also sometimes feeling numbness and tingling in teeth  He has been taking flexeril, hard to tell if helping  No distinct trauma but has done a lot of repetitive movement for jobs over the years  He also states as a kid he punched walls a lot  Has had intermittent knuckle pain right MCP ring finger  He needs refill of adderall, doing well today  Review of Systems   Constitutional: Negative  Respiratory: Negative  Cardiovascular: Negative  Gastrointestinal: Negative  Genitourinary: Negative  Musculoskeletal:        As in HPI   Neurological:        As in HPI   Psychiatric/Behavioral: Negative  The following portions of the patient's history were reviewed and updated as appropriate: allergies, current medications, past family history, past medical history, past social history, past surgical history and problem list       Objective:     Physical Exam   Constitutional: He is oriented to person, place, and time  He appears well-developed and well-nourished  Neck: Normal carotid pulses present  Carotid bruit is not present  Cardiovascular: Normal rate, regular rhythm and normal heart sounds  Pulmonary/Chest: Effort normal  He has decreased breath sounds (mild, improved since last appt)  He has no wheezes  Musculoskeletal:     Neck and spine, upper extremities appear normal to inspection without redness, swelling, ecchymosis or rash  There is minimal tenderness to palpation along the cervical spine into the right cervical paraspinal with no other palpable abnormalities    He has slight decreased range of motion more so with bilateral lateral flexion and bilateral rotation with some right-sided discomfort  He has full range of motion of the shoulders and upper extremities without pain   strength and upper extremity strength is equal and symmetric  Radial pulses are intact and symmetric  Neurological: He is alert and oriented to person, place, and time  He displays no atrophy and no tremor  No cranial nerve deficit or sensory deficit  He exhibits normal muscle tone  Coordination and gait normal    Vitals reviewed        Vitals:    06/05/18 1322 06/05/18 1343   BP: 134/100 118/90   BP Location: Left arm    Patient Position: Sitting    Cuff Size: Standard    Pulse: 99    Resp: 16    Temp: (!) 97 2 °F (36 2 °C)    TempSrc: Tympanic    SpO2: 99%    Weight: 72 2 kg (159 lb 1 6 oz)    Height: 5' 9" (1 753 m)

## 2018-06-06 ENCOUNTER — TELEPHONE (OUTPATIENT)
Dept: FAMILY MEDICINE CLINIC | Facility: CLINIC | Age: 38
End: 2018-06-06

## 2018-06-08 ENCOUNTER — TELEPHONE (OUTPATIENT)
Dept: FAMILY MEDICINE CLINIC | Facility: CLINIC | Age: 38
End: 2018-06-08

## 2018-06-08 NOTE — TELEPHONE ENCOUNTER
I thought we already gave pt off 2 days, so he had time to get his xray done  Is he still having severe sxs to where he cannot work? I am confused because he has had these ongoing sxs for a while that pt just brought up at his appt a few days days ago so did something suddenly change he needs to still have off work   If this is the case, he needs to discuss with MARINA TOMAS

## 2018-06-08 NOTE — TELEPHONE ENCOUNTER
Patient would like a note for out of work for today and tomorrow he stated he is going to get an xray for his neck tomorrow      Please advise

## 2018-06-09 ENCOUNTER — APPOINTMENT (OUTPATIENT)
Dept: RADIOLOGY | Facility: MEDICAL CENTER | Age: 38
End: 2018-06-09
Payer: COMMERCIAL

## 2018-06-09 DIAGNOSIS — G89.29 CHRONIC NECK PAIN: ICD-10-CM

## 2018-06-09 DIAGNOSIS — M54.2 CHRONIC NECK PAIN: ICD-10-CM

## 2018-06-09 DIAGNOSIS — M54.12 RADICULOPATHY OF CERVICAL SPINE: ICD-10-CM

## 2018-06-09 PROCEDURE — 72050 X-RAY EXAM NECK SPINE 4/5VWS: CPT

## 2018-06-11 ENCOUNTER — TELEPHONE (OUTPATIENT)
Dept: FAMILY MEDICINE CLINIC | Facility: CLINIC | Age: 38
End: 2018-06-11

## 2018-06-11 NOTE — TELEPHONE ENCOUNTER
Chris Quinteros called he spoke to his medical department  They will not approve headaches  It needs to say he is being treated for a pinched nerve in his neck that is causing other symptoms  I informed Chris Quinteros I would pass the message along to you

## 2018-06-12 ENCOUNTER — TELEPHONE (OUTPATIENT)
Dept: FAMILY MEDICINE CLINIC | Facility: CLINIC | Age: 38
End: 2018-06-12

## 2018-06-12 ENCOUNTER — DOCUMENTATION (OUTPATIENT)
Dept: FAMILY MEDICINE CLINIC | Facility: CLINIC | Age: 38
End: 2018-06-12

## 2018-06-12 NOTE — TELEPHONE ENCOUNTER
Patient was wondering if his FMLA was filled out? On [facility] desk needs [employer]  Patient needs to be cleared to go back to work  He needs doctors notes for Friday June 8, June 11, June 12, 2018  Can he have them? To [employer] (out of work note)  Patient is requesting review of Xrays   XR spine cervical complete 4 or 5 vw non injury   Results are not yet final     These notes are for me to refer back to

## 2018-06-19 ENCOUNTER — TELEPHONE (OUTPATIENT)
Dept: FAMILY MEDICINE CLINIC | Facility: CLINIC | Age: 38
End: 2018-06-19

## 2018-06-19 NOTE — TELEPHONE ENCOUNTER
Pt's FMLA paperwork is ready and completed ready for   I requested Loly to please call the office back at his convince  Pt's FMLA paperwork is in the green folder up at the front manuela with the rx's  Pt may  at his conviecne  and or if he would like it  faxed we ask he please provide us with one

## 2018-06-20 ENCOUNTER — OFFICE VISIT (OUTPATIENT)
Dept: FAMILY MEDICINE CLINIC | Facility: CLINIC | Age: 38
End: 2018-06-20
Payer: COMMERCIAL

## 2018-06-20 VITALS
HEART RATE: 111 BPM | TEMPERATURE: 97.6 F | SYSTOLIC BLOOD PRESSURE: 120 MMHG | BODY MASS INDEX: 21.39 KG/M2 | WEIGHT: 149.4 LBS | OXYGEN SATURATION: 95 % | HEIGHT: 70 IN | DIASTOLIC BLOOD PRESSURE: 90 MMHG | RESPIRATION RATE: 15 BRPM

## 2018-06-20 DIAGNOSIS — R51.9 CHRONIC NONINTRACTABLE HEADACHE, UNSPECIFIED HEADACHE TYPE: Primary | ICD-10-CM

## 2018-06-20 DIAGNOSIS — G89.29 CHRONIC NECK PAIN: ICD-10-CM

## 2018-06-20 DIAGNOSIS — M54.2 CHRONIC NECK PAIN: ICD-10-CM

## 2018-06-20 DIAGNOSIS — G89.29 CHRONIC NONINTRACTABLE HEADACHE, UNSPECIFIED HEADACHE TYPE: Primary | ICD-10-CM

## 2018-06-20 DIAGNOSIS — R20.2 PARESTHESIAS: ICD-10-CM

## 2018-06-20 DIAGNOSIS — F40.243 ANXIETY WITH FLYING: ICD-10-CM

## 2018-06-20 PROCEDURE — 99214 OFFICE O/P EST MOD 30 MIN: CPT | Performed by: PHYSICIAN ASSISTANT

## 2018-06-20 RX ORDER — ALPRAZOLAM 1 MG/1
TABLET ORAL
Qty: 20 TABLET | Refills: 0 | Status: SHIPPED | OUTPATIENT
Start: 2018-06-20 | End: 2018-10-01

## 2018-06-20 NOTE — LETTER
To Whom It May concern:    [place] continues to follow us for his conditions and symptoms  At this time, though we continue to evaluate and treat the patient, I believe he may return to work with normal duties, being that his FMLA is active and can utilize that should he have any problems or exacerbation of symptoms  We will continue working with patient for his conditions and he  Will continue with frequent follow ups to assess his conditions         Sincerely,        Rip brown PA-C

## 2018-06-20 NOTE — PROGRESS NOTES
Assessment/Plan:    No problem-specific Assessment & Plan notes found for this encounter  Diagnoses and all orders for this visit:    Chronic nonintractable headache, unspecified headache type  -     Ambulatory referral to Physical Therapy; Future  -     MRI brain w wo contrast; Future    Chronic neck pain  -     Ambulatory referral to Physical Therapy; Future  -     MRI brain w wo contrast; Future    Paresthesias  -     Ambulatory referral to Physical Therapy; Future  -     MRI brain w wo contrast; Future    Anxiety with flying  -     ALPRAZolam (XANAX) 1 mg tablet; Take 1 tab PO BID prn anxiety/phobia  Take 1-2 tabs PO 15-20min prior to MRI        79-year-old male presenting today for follow-up to his conditions on symptoms and clearance to return back to work  Patient had been out intermittently the past few weeks for his ongoing issues with headache as well as his chronic neck pain and tingling in his gums  He had a neck x-ray which was completely unremarkable  Patient has had these ongoing issues 4 months and I do not feel he needs any limitation at this time and patient agrees  He does have his FMLA note and I highly advised patient to consider working a shorter schedule or taking off of work should he feel his symptoms are profound that would inhibit him from performing his job duties  Patient understands and a work note is given for patient to return back without restriction but will continue to follow up with me for continued evaluation and he is to utilize FMLA if necessary  Being the patient's neck x-ray was unremarkable I have advised that we move forward with some physical therapy as I do believe his chronic pain is attributed to musculoskeletal cause  However, I am also concerned this patient continues with chronic headaches and we have tried a few different things to treat tension headache as well as sinuses without benefit    With his neurological symptom ongoing chronic neck pain and headaches I recommended a brain MRI to rule out central nervous system abnormality  We will call him with results when obtained  Referral was also given to physical therapy for evaluation of his symptoms and treatment as appropriate  He does need Xanax for his MRI and he also requested it for his upcoming trip as he will be flying  Prescription sent electronically to pharmacy  We will follow up with  After testing and patient initiate physical therapy  He should call or follow up sooner with any concerns or worsening symptoms  Chief Complaint   Patient presents with    Follow-up       Subjective:      Patient ID: Eulalia Pfeiffer is a 44 y o  male  36y/o male here today for f/u to chronic headaches, chronic neck pain and tingling  He still gets tingling in gums/teeth from time to time  Neck pain 2/10 today and has headache today  No worse from previous appt  He needs clearance to RTW  The following portions of the patient's history were reviewed and updated as appropriate: allergies, current medications, past family history, past medical history, past social history, past surgical history and problem list     Review of Systems   Constitutional: Negative  Gastrointestinal: Negative  Genitourinary: Negative  Musculoskeletal:        As in HPI   Neurological:        As in HPI         Objective:      /90 (BP Location: Right arm, Patient Position: Sitting, Cuff Size: Adult)   Pulse (!) 111   Temp 97 6 °F (36 4 °C) (Tympanic)   Resp 15   Ht 5' 9 5" (1 765 m)   Wt 67 8 kg (149 lb 6 4 oz)   SpO2 95%   BMI 21 75 kg/m²          Physical Exam   Constitutional: He is oriented to person, place, and time  He appears well-developed and well-nourished  Neck: Neck supple  Cardiovascular: Normal rate, regular rhythm and normal heart sounds      Pulmonary/Chest: Effort normal and breath sounds normal    Musculoskeletal:   Neck and spine, upper extremities appear normal to inspection without redness, swelling, ecchymosis or rash  There is minimal tenderness to palpation along the cervical spine into the right cervical paraspinal with no other palpable abnormalities  He has slight decreased range of motion more so with bilateral lateral flexion and bilateral rotation with some right-sided discomfort  He has full range of motion of the shoulders and upper extremities without pain   strength and upper extremity strength is equal and symmetric  Radial pulses are intact and symmetric  His exam is similar to prior physical exam from last appt  Neurological: He is alert and oriented to person, place, and time  He displays normal reflexes  He exhibits normal muscle tone  Coordination normal    Psychiatric: He has a normal mood and affect

## 2018-06-25 ENCOUNTER — DOCUMENTATION (OUTPATIENT)
Dept: FAMILY MEDICINE CLINIC | Facility: CLINIC | Age: 38
End: 2018-06-25

## 2018-06-25 NOTE — PROGRESS NOTES
NO AUTH NEEDED THROUGH [facility]  LM FOR PT TO SCHEDULE APPT AT [facility] AND TO CALL BACK IF HE NEEDS THE #

## 2018-06-27 ENCOUNTER — TELEPHONE (OUTPATIENT)
Dept: FAMILY MEDICINE CLINIC | Facility: CLINIC | Age: 38
End: 2018-06-27

## 2018-06-27 DIAGNOSIS — F90.0 ATTENTION DEFICIT HYPERACTIVITY DISORDER (ADHD), PREDOMINANTLY INATTENTIVE TYPE: ICD-10-CM

## 2018-06-27 DIAGNOSIS — F90.0 ADHD, PREDOMINANTLY INATTENTIVE TYPE: Primary | ICD-10-CM

## 2018-06-27 DIAGNOSIS — K21.9 GASTROESOPHAGEAL REFLUX DISEASE, ESOPHAGITIS PRESENCE NOT SPECIFIED: ICD-10-CM

## 2018-06-27 DIAGNOSIS — F40.243 ANXIETY WITH FLYING: ICD-10-CM

## 2018-06-27 RX ORDER — DEXTROAMPHETAMINE SACCHARATE, AMPHETAMINE ASPARTATE MONOHYDRATE, DEXTROAMPHETAMINE SULFATE AND AMPHETAMINE SULFATE 5; 5; 5; 5 MG/1; MG/1; MG/1; MG/1
20 CAPSULE, EXTENDED RELEASE ORAL 2 TIMES DAILY
Qty: 60 CAPSULE | Refills: 0 | Status: SHIPPED | OUTPATIENT
Start: 2018-06-27 | End: 2018-08-08 | Stop reason: SDUPTHER

## 2018-06-27 RX ORDER — ALPRAZOLAM 1 MG/1
TABLET ORAL
Qty: 20 TABLET | Refills: 0 | Status: CANCELLED | OUTPATIENT
Start: 2018-06-27

## 2018-06-27 RX ORDER — OMEPRAZOLE 40 MG/1
40 CAPSULE, DELAYED RELEASE ORAL DAILY
Qty: 90 CAPSULE | Refills: 1 | Status: SHIPPED | OUTPATIENT
Start: 2018-06-27 | End: 2018-08-08 | Stop reason: SDUPTHER

## 2018-06-27 RX ORDER — DEXTROAMPHETAMINE SACCHARATE, AMPHETAMINE ASPARTATE MONOHYDRATE, DEXTROAMPHETAMINE SULFATE AND AMPHETAMINE SULFATE 5; 5; 5; 5 MG/1; MG/1; MG/1; MG/1
20 CAPSULE, EXTENDED RELEASE ORAL 2 TIMES DAILY
Qty: 60 CAPSULE | Refills: 0 | Status: SHIPPED | OUTPATIENT
Start: 2018-06-27 | End: 2018-06-27 | Stop reason: SDUPTHER

## 2018-06-27 RX ORDER — DEXTROAMPHETAMINE SACCHARATE, AMPHETAMINE ASPARTATE MONOHYDRATE, DEXTROAMPHETAMINE SULFATE AND AMPHETAMINE SULFATE 5; 5; 5; 5 MG/1; MG/1; MG/1; MG/1
20 CAPSULE, EXTENDED RELEASE ORAL 2 TIMES DAILY
Qty: 60 CAPSULE | Refills: 0 | Status: CANCELLED | OUTPATIENT
Start: 2018-06-27

## 2018-06-27 NOTE — TELEPHONE ENCOUNTER
Tried calling pt on cellphone but states not in service  Tried # ending in [id number] and requested a call back from pt   Please let pt know, Rx's were sent to pharmacy

## 2018-08-08 ENCOUNTER — TELEPHONE (OUTPATIENT)
Dept: FAMILY MEDICINE CLINIC | Facility: CLINIC | Age: 38
End: 2018-08-08

## 2018-08-08 DIAGNOSIS — K21.9 GASTROESOPHAGEAL REFLUX DISEASE, ESOPHAGITIS PRESENCE NOT SPECIFIED: ICD-10-CM

## 2018-08-08 DIAGNOSIS — F90.0 ATTENTION DEFICIT HYPERACTIVITY DISORDER (ADHD), PREDOMINANTLY INATTENTIVE TYPE: ICD-10-CM

## 2018-08-08 RX ORDER — DEXTROAMPHETAMINE SACCHARATE, AMPHETAMINE ASPARTATE MONOHYDRATE, DEXTROAMPHETAMINE SULFATE AND AMPHETAMINE SULFATE 5; 5; 5; 5 MG/1; MG/1; MG/1; MG/1
20 CAPSULE, EXTENDED RELEASE ORAL 2 TIMES DAILY
Qty: 60 CAPSULE | Refills: 0 | Status: SHIPPED | OUTPATIENT
Start: 2018-08-08 | End: 2018-08-10 | Stop reason: SDUPTHER

## 2018-08-08 RX ORDER — OMEPRAZOLE 40 MG/1
40 CAPSULE, DELAYED RELEASE ORAL DAILY
Qty: 90 CAPSULE | Refills: 1 | Status: SHIPPED | OUTPATIENT
Start: 2018-08-08 | End: 2018-11-09 | Stop reason: SDUPTHER

## 2018-08-08 NOTE — TELEPHONE ENCOUNTER
Patient needs refills for  Adderall XR amphetamine-dextroamphetamine 20 mg 24 hr capsule  60 capsule  Take 1 capsule 20 mg total by mouth 2 two times a day Max Daily Amount : 40 mg  90 days with 1 refill  [facility] pharmacy # [id number], [address], [place]       Omeprazole (PriLosec) 40 MG capsule  40 mg Oral Daily 90 capsule  Take 1 capsule 40 mg total by mouth daily  90 days with 1 refill  [facility] pharmacy #[id number], [address], [place]

## 2018-08-10 DIAGNOSIS — F90.0 ATTENTION DEFICIT HYPERACTIVITY DISORDER (ADHD), PREDOMINANTLY INATTENTIVE TYPE: ICD-10-CM

## 2018-08-10 RX ORDER — DEXTROAMPHETAMINE SACCHARATE, AMPHETAMINE ASPARTATE MONOHYDRATE, DEXTROAMPHETAMINE SULFATE AND AMPHETAMINE SULFATE 5; 5; 5; 5 MG/1; MG/1; MG/1; MG/1
20 CAPSULE, EXTENDED RELEASE ORAL 2 TIMES DAILY
Qty: 60 CAPSULE | Refills: 0 | Status: SHIPPED | OUTPATIENT
Start: 2018-08-10 | End: 2018-09-05 | Stop reason: SDUPTHER

## 2018-08-10 NOTE — TELEPHONE ENCOUNTER
Pt called stating he went to [facility] and only one script was there (the Prilosec), informed pt the other script was printed out  Talked to Dr Jody Miles, she sent the rx for Adderall XR over to [facility] in [address] [place] [address]   Informed pt it was sent over to [facility]

## 2018-08-17 DIAGNOSIS — I10 ESSENTIAL HYPERTENSION: ICD-10-CM

## 2018-08-18 RX ORDER — LISINOPRIL 20 MG/1
TABLET ORAL
Qty: 90 TABLET | Refills: 1 | Status: SHIPPED | OUTPATIENT
Start: 2018-08-18 | End: 2018-11-09 | Stop reason: SDUPTHER

## 2018-09-04 ENCOUNTER — TELEPHONE (OUTPATIENT)
Dept: FAMILY MEDICINE CLINIC | Facility: CLINIC | Age: 38
End: 2018-09-04

## 2018-09-05 DIAGNOSIS — F90.0 ATTENTION DEFICIT HYPERACTIVITY DISORDER (ADHD), PREDOMINANTLY INATTENTIVE TYPE: ICD-10-CM

## 2018-09-05 RX ORDER — DEXTROAMPHETAMINE SACCHARATE, AMPHETAMINE ASPARTATE MONOHYDRATE, DEXTROAMPHETAMINE SULFATE AND AMPHETAMINE SULFATE 5; 5; 5; 5 MG/1; MG/1; MG/1; MG/1
20 CAPSULE, EXTENDED RELEASE ORAL 2 TIMES DAILY
Qty: 60 CAPSULE | Refills: 0 | Status: SHIPPED | OUTPATIENT
Start: 2018-09-05 | End: 2018-10-03 | Stop reason: SDUPTHER

## 2018-09-11 ENCOUNTER — OFFICE VISIT (OUTPATIENT)
Dept: FAMILY MEDICINE CLINIC | Facility: CLINIC | Age: 38
End: 2018-09-11
Payer: COMMERCIAL

## 2018-09-11 VITALS
OXYGEN SATURATION: 99 % | HEART RATE: 87 BPM | BODY MASS INDEX: 22.2 KG/M2 | DIASTOLIC BLOOD PRESSURE: 100 MMHG | WEIGHT: 152.5 LBS | SYSTOLIC BLOOD PRESSURE: 150 MMHG | TEMPERATURE: 96.5 F

## 2018-09-11 DIAGNOSIS — J20.9 ACUTE BRONCHITIS, UNSPECIFIED ORGANISM: Primary | ICD-10-CM

## 2018-09-11 PROCEDURE — 99213 OFFICE O/P EST LOW 20 MIN: CPT | Performed by: PHYSICIAN ASSISTANT

## 2018-09-11 RX ORDER — AZITHROMYCIN 250 MG/1
TABLET, FILM COATED ORAL
Qty: 6 TABLET | Refills: 0 | Status: SHIPPED | OUTPATIENT
Start: 2018-09-11 | End: 2018-09-15

## 2018-09-11 RX ORDER — PROMETHAZINE HYDROCHLORIDE AND CODEINE PHOSPHATE 6.25; 1 MG/5ML; MG/5ML
5 SYRUP ORAL EVERY 4 HOURS PRN
Qty: 120 ML | Refills: 0 | Status: SHIPPED | OUTPATIENT
Start: 2018-09-11 | End: 2018-10-01

## 2018-09-11 RX ORDER — PREDNISONE 10 MG/1
TABLET ORAL
Qty: 21 TABLET | Refills: 0 | Status: SHIPPED | OUTPATIENT
Start: 2018-09-11 | End: 2018-09-16

## 2018-09-11 NOTE — PROGRESS NOTES
Assessment/Plan:         Diagnoses and all orders for this visit:    Acute bronchitis, unspecified organism  -     azithromycin (ZITHROMAX) 250 mg tablet; Take 2 tablets today then 1 tablet daily x 4 days  -     promethazine-codeine (PHENERGAN WITH CODEINE) 6 25-10 mg/5 mL syrup; Take 5 mL by mouth every 4 (four) hours as needed for cough  -     predniSONE 10 mg tablet; 6-5-4-3-2-1 taper with food  Discussed OTC cold meds  Fever Care, ER instructions given  F/U 5-7 days if not resolved  Subjective:      Patient ID: Ciro Ricks is a 43 y o  male  Pt presents with 1 day history of body aches, fatigue, nasal congestion, ST, PND, productive cough, chest congestion  Denies fever, chills, N/V/D  He has taken Dayquil  Denies hx of asthma/allergies  Smokes 1 ppd  The following portions of the patient's history were reviewed and updated as appropriate:   He  has a past medical history of Acute actinic otitis externa, right ear; Acute sinusitis; Alcohol withdrawal ([address] ); Allergic rhinitis; Gastroenteritis; Right ankle sprain; Strain of left knee; and Strain of peroneal tendon of right foot  He   Patient Active Problem List    Diagnosis Date Noted    Essential hypertension 05/15/2017    Osgood-Schlatter's disease 08/14/2015    ADHD, predominantly inattentive type 09/05/2012     He  has a past surgical history that includes [place] tooth extraction  His family history includes Seizures in his father  He  reports that he has been smoking  He has never used smokeless tobacco  He reports that he drinks alcohol  He reports that he does not use drugs    Current Outpatient Prescriptions   Medication Sig Dispense Refill    amphetamine-dextroamphetamine (ADDERALL XR) 20 MG 24 hr capsule Take 1 capsule (20 mg total) by mouth 2 (two) times a day Max Daily Amount: 40 mg 60 capsule 0    budesonide-formoterol (SYMBICORT) 160-4 5 mcg/act inhaler Inhale 2 puffs 2 (two) times a day 1 Inhaler 5    lisinopril (ZESTRIL) 20 mg tablet TAKE 1 TABLET BY MOUTH EVERY DAY 90 tablet 1    omeprazole (PriLOSEC) 40 MG capsule Take 1 capsule (40 mg total) by mouth daily 90 capsule 1    ALPRAZolam (XANAX) 1 mg tablet Take 1 tab PO BID prn anxiety/phobia  Take 1-2 tabs PO 15-20min prior to MRI (Patient not taking: Reported on 9/11/2018 ) 20 tablet 0    cyclobenzaprine (FLEXERIL) 10 mg tablet Take 1 tablet (10 mg total) by mouth daily at bedtime (Patient not taking: Reported on 9/11/2018 ) 30 tablet 0    meclizine (ANTIVERT) 25 mg tablet Take 1 tablet (25 mg total) by mouth every 8 (eight) hours (Patient not taking: Reported on 9/11/2018 ) 30 tablet 1    promethazine-codeine (PHENERGAN WITH CODEINE) 6 25-10 mg/5 mL syrup Take 5 mL by mouth every 4 (four) hours as needed for cough 120 mL 0     No current facility-administered medications for this visit  Current Outpatient Prescriptions on File Prior to Visit   Medication Sig    amphetamine-dextroamphetamine (ADDERALL XR) 20 MG 24 hr capsule Take 1 capsule (20 mg total) by mouth 2 (two) times a day Max Daily Amount: 40 mg    budesonide-formoterol (SYMBICORT) 160-4 5 mcg/act inhaler Inhale 2 puffs 2 (two) times a day    lisinopril (ZESTRIL) 20 mg tablet TAKE 1 TABLET BY MOUTH EVERY DAY    omeprazole (PriLOSEC) 40 MG capsule Take 1 capsule (40 mg total) by mouth daily    ALPRAZolam (XANAX) 1 mg tablet Take 1 tab PO BID prn anxiety/phobia  Take 1-2 tabs PO 15-20min prior to MRI (Patient not taking: Reported on 9/11/2018 )    cyclobenzaprine (FLEXERIL) 10 mg tablet Take 1 tablet (10 mg total) by mouth daily at bedtime (Patient not taking: Reported on 9/11/2018 )    meclizine (ANTIVERT) 25 mg tablet Take 1 tablet (25 mg total) by mouth every 8 (eight) hours (Patient not taking: Reported on 9/11/2018 )     No current facility-administered medications on file prior to visit  He has No Known Allergies       Review of Systems   Constitutional: Negative      HENT: Positive for congestion, postnasal drip and sore throat  Respiratory: Positive for cough and chest tightness  Cardiovascular: Negative  Gastrointestinal: Negative  Genitourinary: Negative  Musculoskeletal: Positive for myalgias  Objective:      /100 (BP Location: Left arm, Patient Position: Sitting)   Pulse 87   Temp (!) 96 5 °F (35 8 °C) (Tympanic)   Wt 69 2 kg (152 lb 8 oz)   SpO2 99%   BMI 22 20 kg/m²          Physical Exam   Constitutional: He is oriented to person, place, and time  He appears well-developed and well-nourished  No distress  HENT:   Right Ear: Hearing, tympanic membrane, external ear and ear canal normal    Left Ear: Hearing, tympanic membrane, external ear and ear canal normal    Nose: Mucosal edema (B/L boggy turbinates) present  Mouth/Throat: Mucous membranes are normal  Posterior oropharyngeal erythema (PND) present  No oropharyngeal exudate  Neck: Neck supple  Cardiovascular: Normal rate, regular rhythm and normal heart sounds  Pulmonary/Chest: Effort normal  He has wheezes (B/L diffuse scattered)  He has rhonchi (B/L diffuse )  Lymphadenopathy:     He has no cervical adenopathy  Neurological: He is alert and oriented to person, place, and time  Psychiatric: He has a normal mood and affect  Vitals reviewed

## 2018-09-11 NOTE — LETTER
September 11, 2018     Patient: Eulalia Pfeiffer   YOB: 1980   Date of Visit: 9/11/2018       To Whom it May Concern:    Eulalia Pfeiffer is under my professional care  He was seen in my office on 9/11/2018  He may return to work on 9/13/2018  He is to be excused from work for dates of 9/11/2018 and 9/12/2018  If you have any questions or concerns, please don't hesitate to call           Sincerely,          Debbie Estrada PA-C        CC: No Recipients

## 2018-10-01 ENCOUNTER — OFFICE VISIT (OUTPATIENT)
Dept: FAMILY MEDICINE CLINIC | Facility: CLINIC | Age: 38
End: 2018-10-01
Payer: COMMERCIAL

## 2018-10-01 VITALS
SYSTOLIC BLOOD PRESSURE: 146 MMHG | RESPIRATION RATE: 18 BRPM | OXYGEN SATURATION: 98 % | DIASTOLIC BLOOD PRESSURE: 90 MMHG | WEIGHT: 156.19 LBS | HEART RATE: 81 BPM | HEIGHT: 70 IN | BODY MASS INDEX: 22.36 KG/M2 | TEMPERATURE: 97.3 F

## 2018-10-01 DIAGNOSIS — J20.9 ACUTE BRONCHITIS, UNSPECIFIED ORGANISM: ICD-10-CM

## 2018-10-01 DIAGNOSIS — R06.2 WHEEZING: Primary | ICD-10-CM

## 2018-10-01 PROCEDURE — 99214 OFFICE O/P EST MOD 30 MIN: CPT | Performed by: FAMILY MEDICINE

## 2018-10-01 RX ORDER — LEVOFLOXACIN 500 MG/1
500 TABLET, FILM COATED ORAL EVERY 24 HOURS
Qty: 7 TABLET | Refills: 0 | Status: SHIPPED | OUTPATIENT
Start: 2018-10-01 | End: 2018-10-08

## 2018-10-01 RX ORDER — BUDESONIDE AND FORMOTEROL FUMARATE DIHYDRATE 160; 4.5 UG/1; UG/1
2 AEROSOL RESPIRATORY (INHALATION) 2 TIMES DAILY
Qty: 1 INHALER | Refills: 0 | Status: SHIPPED | OUTPATIENT
Start: 2018-10-01 | End: 2018-11-02 | Stop reason: SDUPTHER

## 2018-10-01 RX ORDER — MONTELUKAST SODIUM 10 MG/1
10 TABLET ORAL
Qty: 30 TABLET | Refills: 5 | Status: SHIPPED | OUTPATIENT
Start: 2018-10-01 | End: 2019-06-29 | Stop reason: ALTCHOICE

## 2018-10-01 NOTE — PROGRESS NOTES
Assessment/Plan:   1  Acute bronchitis, unspecified organism/Wheezing  Reviewed patient's symptoms with him  At this time, it appears his symptoms are secondary to acute bronchitis on chronic reactive airway disease  He does smoke persistently  He states that he is currently 1 pack per day  He was instructed on the great importance of quitting his tobacco use  Will consider pulmonary function test in the near future  Will continue with Symbicort daily  He was given a refill on this medication  Start treatment with Singulair  He was given a prescription as well for Levaquin  Follow up if any symptoms worsen or persist   - budesonide-formoterol (SYMBICORT) 160-4 5 mcg/act inhaler; Inhale 2 puffs 2 (two) times a day  Dispense: 1 Inhaler; Refill: 0  - montelukast (SINGULAIR) 10 mg tablet; Take 1 tablet (10 mg total) by mouth daily at bedtime  Dispense: 30 tablet; Refill: 5  - levofloxacin (LEVAQUIN) 500 mg tablet; Take 1 tablet (500 mg total) by mouth every 24 hours for 7 days  Dispense: 7 tablet; Refill: 0     There are no diagnoses linked to this encounter  Subjective:    Chief Complaint   Patient presents with    Cold Like Symptoms     coughing, congestion, PND, watery eyes x 2 weeks  Has used throat spray with no relief        Patient ID: Madan Butler is a 43 y o  male  URI    This is a new problem  Episode onset: 2 weeks ago  The problem has been gradually worsening  There has been no fever  Associated symptoms include congestion, coughing, headaches, sinus pain and a sore throat  Pertinent negatives include no abdominal pain, chest pain, diarrhea, dysuria, ear pain or nausea  He has tried antihistamine for the symptoms  The treatment provided no relief  Review of Systems   Constitutional: Negative for activity change, chills, fatigue and fever  HENT: Positive for congestion, sinus pain and sore throat  Negative for ear pain and sinus pressure      Eyes: Negative for redness, itching and visual disturbance  Respiratory: Positive for cough  Negative for shortness of breath  Cardiovascular: Negative for chest pain and palpitations  Gastrointestinal: Negative for abdominal pain, diarrhea and nausea  Endocrine: Negative for cold intolerance and heat intolerance  Genitourinary: Negative for dysuria, flank pain and frequency  Musculoskeletal: Negative for arthralgias, back pain, gait problem and myalgias  Skin: Negative for color change  Allergic/Immunologic: Negative for environmental allergies  Neurological: Positive for headaches  Negative for dizziness and numbness  Psychiatric/Behavioral: Negative for behavioral problems and sleep disturbance  The following portions of the patient's history were reviewed and updated as appropriate : past family history, past medical history, past social history and past surgical history  Current Outpatient Prescriptions:     amphetamine-dextroamphetamine (ADDERALL XR) 20 MG 24 hr capsule, Take 1 capsule (20 mg total) by mouth 2 (two) times a day Max Daily Amount: 40 mg, Disp: 60 capsule, Rfl: 0    budesonide-formoterol (SYMBICORT) 160-4 5 mcg/act inhaler, Inhale 2 puffs 2 (two) times a day (Patient taking differently: Inhale 2 puffs as needed  ), Disp: 1 Inhaler, Rfl: 5    lisinopril (ZESTRIL) 20 mg tablet, TAKE 1 TABLET BY MOUTH EVERY DAY, Disp: 90 tablet, Rfl: 1    omeprazole (PriLOSEC) 40 MG capsule, Take 1 capsule (40 mg total) by mouth daily, Disp: 90 capsule, Rfl: 1    ALPRAZolam (XANAX) 1 mg tablet, Take 1 tab PO BID prn anxiety/phobia   Take 1-2 tabs PO 15-20min prior to MRI (Patient not taking: Reported on 10/1/2018 ), Disp: 20 tablet, Rfl: 0    cyclobenzaprine (FLEXERIL) 10 mg tablet, Take 1 tablet (10 mg total) by mouth daily at bedtime (Patient not taking: Reported on 10/1/2018 ), Disp: 30 tablet, Rfl: 0    meclizine (ANTIVERT) 25 mg tablet, Take 1 tablet (25 mg total) by mouth every 8 (eight) hours (Patient not taking: Reported on 10/1/2018 ), Disp: 30 tablet, Rfl: 1    Objective:    Vitals:    10/01/18 1456   BP: 146/90   BP Location: Left arm   Patient Position: Sitting   Cuff Size: Adult   Pulse: 81   Resp: 18   Temp: (!) 97 3 °F (36 3 °C)   TempSrc: Tympanic   SpO2: 98%   Weight: 70 8 kg (156 lb 3 oz)   Height: 5' 9 5" (1 765 m)        Physical Exam   Constitutional: He is oriented to person, place, and time  He appears well-developed and well-nourished  HENT:   Head: Normocephalic and atraumatic  Nose: Nose normal    Mouth/Throat: No oropharyngeal exudate  Eyes: Pupils are equal, round, and reactive to light  Right eye exhibits no discharge  Left eye exhibits no discharge  Neck: Normal range of motion  Neck supple  No tracheal deviation present  Cardiovascular: Normal rate, regular rhythm and intact distal pulses  Exam reveals no gallop and no friction rub  No murmur heard  Pulses:       Dorsalis pedis pulses are 2+ on the right side, and 2+ on the left side  Posterior tibial pulses are 2+ on the right side, and 2+ on the left side  Pulmonary/Chest: Effort normal and breath sounds normal  No respiratory distress  He has no wheezes  He has no rales  Abdominal: Soft  Bowel sounds are normal  He exhibits no distension  There is no tenderness  There is no rebound and no guarding  Musculoskeletal: Normal range of motion  He exhibits no edema  Lymphadenopathy:        Head (right side): No submental and no submandibular adenopathy present  Head (left side): No submental and no submandibular adenopathy present  He has no cervical adenopathy  Right cervical: No superficial cervical, no deep cervical and no posterior cervical adenopathy present  Left cervical: No superficial cervical, no deep cervical and no posterior cervical adenopathy present  Neurological: He is alert and oriented to person, place, and time  No cranial nerve deficit or sensory deficit     Skin: Skin is warm, dry and intact  Psychiatric: His speech is normal and behavior is normal  Judgment normal  His mood appears not anxious  Cognition and memory are normal  He does not exhibit a depressed mood  Vitals reviewed

## 2018-10-03 ENCOUNTER — OFFICE VISIT (OUTPATIENT)
Dept: FAMILY MEDICINE CLINIC | Facility: CLINIC | Age: 38
End: 2018-10-03
Payer: COMMERCIAL

## 2018-10-03 VITALS
HEART RATE: 91 BPM | RESPIRATION RATE: 18 BRPM | TEMPERATURE: 98.1 F | HEIGHT: 70 IN | BODY MASS INDEX: 21.81 KG/M2 | WEIGHT: 152.31 LBS | DIASTOLIC BLOOD PRESSURE: 88 MMHG | OXYGEN SATURATION: 98 % | SYSTOLIC BLOOD PRESSURE: 130 MMHG

## 2018-10-03 DIAGNOSIS — I10 ESSENTIAL HYPERTENSION: ICD-10-CM

## 2018-10-03 DIAGNOSIS — F90.0 ATTENTION DEFICIT HYPERACTIVITY DISORDER (ADHD), PREDOMINANTLY INATTENTIVE TYPE: Primary | ICD-10-CM

## 2018-10-03 PROCEDURE — 99214 OFFICE O/P EST MOD 30 MIN: CPT | Performed by: FAMILY MEDICINE

## 2018-10-03 RX ORDER — DEXTROAMPHETAMINE SACCHARATE, AMPHETAMINE ASPARTATE MONOHYDRATE, DEXTROAMPHETAMINE SULFATE AND AMPHETAMINE SULFATE 7.5; 7.5; 7.5; 7.5 MG/1; MG/1; MG/1; MG/1
30 CAPSULE, EXTENDED RELEASE ORAL 2 TIMES DAILY
Qty: 60 CAPSULE | Refills: 0 | Status: SHIPPED | OUTPATIENT
Start: 2018-10-03 | End: 2018-11-09 | Stop reason: SDUPTHER

## 2018-10-03 NOTE — PROGRESS NOTES
Assessment/Plan:      Discussed smoking cessation strategies using nicotine replacement including gum and patches  Attention deficit hyperactivity disorder (ADHD), predominantly inattentive type   ADD not well controlled presently  Will increase dosage to  30 mg twice daily  Essential hypertension    Controlled on current dosage of 20 mg lisinopril daily  Diagnoses and all orders for this visit:    Attention deficit hyperactivity disorder (ADHD), predominantly inattentive type  -     amphetamine-dextroamphetamine (ADDERALL XR) 30 MG 24 hr capsule; Take 1 capsule (30 mg total) by mouth 2 (two) times a day Max Daily Amount: 60 mg    Essential hypertension          Subjective:      Patient ID: Sebastian Myers is a 43 y o  male  Patient presents to follow-up his ADD treatment  He is currently taking Adderall XR 20 mg twice daily  He finds that the medication does not work as well as he would like  He still has some difficulty with focus  He has no side effects from the medication  Secondly he is here to follow-up on his blood pressure  He is currently taking lisinopril 20 mg daily  He has no side effects  He does not check his blood pressure at home  Thirdly he wishes to discuss smoking cessation  He is smoking approximately 1 pack of cigarettes per day  He tried Chantix and was unable to tolerate the side effects  He is interested in discussing nicotine replacement strategies  The following portions of the patient's history were reviewed and updated as appropriate: allergies, current medications, past family history, past medical history, past social history, past surgical history and problem list     Review of Systems   Constitutional: Negative  Respiratory: Negative  Cardiovascular: Negative  Gastrointestinal: Negative  Genitourinary: Negative  Musculoskeletal: Negative      Psychiatric/Behavioral:          Decreased focus         Objective:      /88 (BP Location: Left arm, Patient Position: Sitting, Cuff Size: Large)   Pulse 91   Temp 98 1 °F (36 7 °C) (Tympanic)   Resp 18   Ht 5' 9 5" (1 765 m)   Wt 69 1 kg (152 lb 5 oz)   SpO2 98%   BMI 22 17 kg/m²          Physical Exam   Constitutional: He is oriented to person, place, and time  He appears well-developed and well-nourished  No distress  HENT:   Head: Normocephalic and atraumatic  Eyes: Pupils are equal, round, and reactive to light  Conjunctivae are normal  Right eye exhibits no discharge  Neck: Normal range of motion  No thyromegaly present  Cardiovascular: Normal rate and regular rhythm  Pulmonary/Chest: Effort normal and breath sounds normal  No respiratory distress  Lymphadenopathy:     He has no cervical adenopathy  Neurological: He is alert and oriented to person, place, and time  Skin: Skin is warm and dry  He is not diaphoretic  Psychiatric: He has a normal mood and affect  His behavior is normal  Judgment and thought content normal    Nursing note and vitals reviewed

## 2018-11-02 DIAGNOSIS — R06.2 WHEEZING: ICD-10-CM

## 2018-11-02 RX ORDER — BUDESONIDE AND FORMOTEROL FUMARATE DIHYDRATE 160; 4.5 UG/1; UG/1
AEROSOL RESPIRATORY (INHALATION)
Qty: 10.2 INHALER | Refills: 0 | Status: SHIPPED | OUTPATIENT
Start: 2018-11-02 | End: 2019-01-23 | Stop reason: ALTCHOICE

## 2018-11-09 DIAGNOSIS — F90.0 ATTENTION DEFICIT HYPERACTIVITY DISORDER (ADHD), PREDOMINANTLY INATTENTIVE TYPE: ICD-10-CM

## 2018-11-09 DIAGNOSIS — K21.9 GASTROESOPHAGEAL REFLUX DISEASE, ESOPHAGITIS PRESENCE NOT SPECIFIED: ICD-10-CM

## 2018-11-09 DIAGNOSIS — I10 ESSENTIAL HYPERTENSION: ICD-10-CM

## 2018-11-09 RX ORDER — LISINOPRIL 20 MG/1
20 TABLET ORAL DAILY
Qty: 90 TABLET | Refills: 1 | Status: SHIPPED | OUTPATIENT
Start: 2018-11-09 | End: 2019-04-29 | Stop reason: SDUPTHER

## 2018-11-09 RX ORDER — DEXTROAMPHETAMINE SACCHARATE, AMPHETAMINE ASPARTATE MONOHYDRATE, DEXTROAMPHETAMINE SULFATE AND AMPHETAMINE SULFATE 7.5; 7.5; 7.5; 7.5 MG/1; MG/1; MG/1; MG/1
30 CAPSULE, EXTENDED RELEASE ORAL 2 TIMES DAILY
Qty: 60 CAPSULE | Refills: 0 | Status: SHIPPED | OUTPATIENT
Start: 2018-11-09 | End: 2018-12-07 | Stop reason: SDUPTHER

## 2018-11-09 RX ORDER — OMEPRAZOLE 40 MG/1
40 CAPSULE, DELAYED RELEASE ORAL DAILY
Qty: 90 CAPSULE | Refills: 1 | Status: SHIPPED | OUTPATIENT
Start: 2018-11-09 | End: 2019-12-05 | Stop reason: SDUPTHER

## 2018-11-30 ENCOUNTER — OFFICE VISIT (OUTPATIENT)
Dept: FAMILY MEDICINE CLINIC | Facility: CLINIC | Age: 38
End: 2018-11-30
Payer: COMMERCIAL

## 2018-11-30 VITALS
DIASTOLIC BLOOD PRESSURE: 90 MMHG | TEMPERATURE: 96.1 F | SYSTOLIC BLOOD PRESSURE: 130 MMHG | RESPIRATION RATE: 17 BRPM | WEIGHT: 155.9 LBS | OXYGEN SATURATION: 99 % | BODY MASS INDEX: 23.09 KG/M2 | HEIGHT: 69 IN | HEART RATE: 74 BPM

## 2018-11-30 DIAGNOSIS — F17.200 SMOKING: ICD-10-CM

## 2018-11-30 DIAGNOSIS — J40 BRONCHITIS: ICD-10-CM

## 2018-11-30 DIAGNOSIS — J01.90 ACUTE SINUSITIS, RECURRENCE NOT SPECIFIED, UNSPECIFIED LOCATION: Primary | ICD-10-CM

## 2018-11-30 DIAGNOSIS — F17.200 SMOKER: ICD-10-CM

## 2018-11-30 DIAGNOSIS — I10 ESSENTIAL HYPERTENSION: ICD-10-CM

## 2018-11-30 PROCEDURE — 3008F BODY MASS INDEX DOCD: CPT | Performed by: NURSE PRACTITIONER

## 2018-11-30 PROCEDURE — 99214 OFFICE O/P EST MOD 30 MIN: CPT | Performed by: NURSE PRACTITIONER

## 2018-11-30 RX ORDER — AMOXICILLIN 875 MG/1
875 TABLET, COATED ORAL 2 TIMES DAILY
Qty: 20 TABLET | Refills: 0 | Status: SHIPPED | OUTPATIENT
Start: 2018-11-30 | End: 2018-12-10

## 2018-11-30 RX ORDER — NICOTINE 21 MG/24HR
1 PATCH, TRANSDERMAL 24 HOURS TRANSDERMAL EVERY 24 HOURS
Qty: 28 PATCH | Refills: 0 | Status: SHIPPED | OUTPATIENT
Start: 2018-11-30 | End: 2019-01-23 | Stop reason: ALTCHOICE

## 2018-11-30 RX ORDER — ALBUTEROL SULFATE 90 UG/1
2 AEROSOL, METERED RESPIRATORY (INHALATION) EVERY 6 HOURS PRN
Qty: 6.7 G | Refills: 0 | Status: SHIPPED | OUTPATIENT
Start: 2018-11-30 | End: 2019-08-29 | Stop reason: ALTCHOICE

## 2018-11-30 RX ORDER — NICOTINE 21 MG/24HR
1 PATCH, TRANSDERMAL 24 HOURS TRANSDERMAL EVERY 24 HOURS
Qty: 14 PATCH | Refills: 0 | Status: SHIPPED | OUTPATIENT
Start: 2018-11-30 | End: 2019-01-23 | Stop reason: ALTCHOICE

## 2018-11-30 NOTE — LETTER
November 30, 2018     Patient: Connor Mathews   YOB: 1980   Date of Visit: 11/30/2018       To Whom it May Concern:    Connor Mathews is under my professional care  He was seen in my office on 11/30/2018  Please excuse from work 11/29 and 11/30/18  If you have any questions or concerns, please don't hesitate to call           Sincerely,          NICHOLAS Porter        CC: No Recipients

## 2018-12-02 NOTE — PROGRESS NOTES
[facility]    ASSESSMENT AND PLAN     1  Acute sinusitis, recurrence not specified, unspecified location  44 yo male presents today with s/s suggestive of acute sinusitis and bronchitis  Physical assessment as below  Rx given for 10 day course Amoxicillin  Symptom management reviewed: increase fluids, increase rest, may take Tylenol  Patient is to return to office is his symptoms persist or worsen  - amoxicillin (AMOXIL) 875 mg tablet; Take 1 tablet (875 mg total) by mouth 2 (two) times a day for 10 days  Dispense: 20 tablet; Refill: 0    2  Bronchitis  Discussed potential of Prednisone taper if Inhaler does not provide adequate relief  Patient would rather not at this point but will call if his symptoms persist    - albuterol (PROVENTIL HFA) 90 mcg/act inhaler; Inhale 2 puffs every 6 (six) hours as needed for wheezing  Dispense: 6 7 g; Refill: 0    3  Essential hypertension  Patient admits to missing his lisinopril on occasion  States it not intentional, he just leaves his medication at home  His blood pressure today was 130/90  We reviewed the importance of compliance with this medication and risks of long-term, uncontrolled hypertension  States he will be more diligent  He should follow up in one month for blood pressure recheck after consistently taking his medication daily  4  Smoker/ 5  Smoking  Discussed smoking cessation with patient  He states he is ready to try again  He will use Nicoderm patch over the next 6 weeks, decreasing strength every 2 weeks  - nicotine (NICODERM CQ) 21 mg/24 hr TD 24 hr patch; Place 1 patch on the skin every 24 hours  Dispense: 14 patch; Refill: 0  - nicotine (NICODERM CQ) 14 mg/24hr TD 24 hr patch; Place 1 patch on the skin every 24 hours  Dispense: 28 patch; Refill: 0  - nicotine (NICODERM CQ) 7 mg/24hr TD 24 hr patch; Place 1 patch on the skin every 24 hours  Dispense: 28 patch; Refill: 0      SUBJECTIVE       Patient ID: Neal Miranda is a 43 y o  male     Chief Complaint   Patient presents with    Headache     pt complians of his chest burning/stomach aches/shakey/nausea  states this has been going on for about 3 to 4 days       HISTORY OF PRESENT ILLNESS    Patient presents today with 3-4 day history of chest burning/tightness, sinus pain,  pressure and congestion with productive cough  He states he feels achy  States his symptoms have been worsening over the last day  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, and he noted that there was mold behind some drywall he was pulling out  As soon as soon as he saw the mold he did put a mask on  He also wanted to discuss trying to quit smoking  He has smoked for years, and did successfully quit for a few months  But then started back up again  He feels he has respiratory symptoms a lot, and states he is ready to try to quit again  The following portions of the patient's history were reviewed and updated as appropriate: allergies, current medications, past family history, past medical history, past social history, past surgical history and problem list     REVIEW OF SYSTEMS  Review of Systems   Constitutional: Positive for fatigue  Negative for chills and fever  HENT: Positive for congestion, sinus pain and sinus pressure  Negative for ear discharge and ear pain  Eyes: Negative  Respiratory: Positive for cough and chest tightness  Negative for shortness of breath and wheezing  Gastrointestinal: Positive for abdominal pain and nausea  Negative for abdominal distention, blood in stool, constipation, diarrhea and vomiting  Genitourinary: Negative  Musculoskeletal: Positive for back pain, myalgias and neck pain  Back and neck pain is chronic, not new symptom  Skin: Negative  Neurological: Negative for dizziness, weakness, light-headedness, numbness and headaches  Psychiatric/Behavioral: Negative          OBJECTIVE    ,  VITAL SIGNS  /90 (BP Location: Left arm, Patient Position: Sitting, Cuff Size: Adult)   Pulse 74   Temp (!) 96 1 °F (35 6 °C) (Tympanic)   Resp 17   Ht 5' 9" (1 753 m)   Wt 70 7 kg (155 lb 14 4 oz)   SpO2 99%   BMI 23 02 kg/m²       PHYSICAL EXAMINATION   Physical Exam   Constitutional: He is oriented to person, place, and time  He appears well-developed and well-nourished  HENT:   Head: Normocephalic  Right Ear: Hearing, tympanic membrane, external ear and ear canal normal  No middle ear effusion  Left Ear: Hearing, tympanic membrane, external ear and ear canal normal   No middle ear effusion  Nose: Mucosal edema present  Right sinus exhibits maxillary sinus tenderness  Right sinus exhibits no frontal sinus tenderness  Left sinus exhibits maxillary sinus tenderness  Left sinus exhibits no frontal sinus tenderness  Mouth/Throat: Oropharynx is clear and moist and mucous membranes are normal    Nasal mucosa red/inflammed   Eyes: Conjunctivae are normal  Right eye exhibits no discharge  Left eye exhibits no discharge  Neck: Normal range of motion  Cardiovascular: Normal rate, regular rhythm, S1 normal, S2 normal and normal heart sounds  No murmur heard  Pulmonary/Chest: Effort normal  No respiratory distress  He has wheezes (Bibasilar)  He has no rales  Abdominal: Soft  Normal appearance and bowel sounds are normal  He exhibits no distension  There is no tenderness  There is no rebound  Musculoskeletal: Normal range of motion  Lymphadenopathy:        Head (right side): No submental and no submandibular adenopathy present  Head (left side): No submental and no submandibular adenopathy present  He has no cervical adenopathy  Neurological: He is alert and oriented to person, place, and time  Skin: Skin is warm, dry and intact  Psychiatric: He has a normal mood and affect  Judgment and thought content normal  Cognition and memory are normal    Nursing note and vitals reviewed

## 2018-12-06 ENCOUNTER — TELEPHONE (OUTPATIENT)
Dept: FAMILY MEDICINE CLINIC | Facility: CLINIC | Age: 38
End: 2018-12-06

## 2018-12-06 NOTE — TELEPHONE ENCOUNTER
Pt called stating he was out of work today since he is not feeling completely better yet  Wants to see if he could have this date added onto his work excuse  Please advise

## 2018-12-07 DIAGNOSIS — F90.0 ATTENTION DEFICIT HYPERACTIVITY DISORDER (ADHD), PREDOMINANTLY INATTENTIVE TYPE: ICD-10-CM

## 2018-12-07 RX ORDER — DEXTROAMPHETAMINE SACCHARATE, AMPHETAMINE ASPARTATE MONOHYDRATE, DEXTROAMPHETAMINE SULFATE AND AMPHETAMINE SULFATE 7.5; 7.5; 7.5; 7.5 MG/1; MG/1; MG/1; MG/1
30 CAPSULE, EXTENDED RELEASE ORAL 2 TIMES DAILY
Qty: 60 CAPSULE | Refills: 0 | Status: SHIPPED | OUTPATIENT
Start: 2018-12-07 | End: 2019-01-08 | Stop reason: SDUPTHER

## 2018-12-07 NOTE — TELEPHONE ENCOUNTER
Pt called back stating he was told he would here something today about the note for work  Spoke with Sneha, she approved adding another day to the work note for pt  Advised pt we had approval and would have a new note printed and at the  for him to

## 2018-12-17 ENCOUNTER — OFFICE VISIT (OUTPATIENT)
Dept: FAMILY MEDICINE CLINIC | Facility: CLINIC | Age: 38
End: 2018-12-17
Payer: COMMERCIAL

## 2018-12-17 VITALS
HEIGHT: 69 IN | WEIGHT: 154 LBS | BODY MASS INDEX: 22.81 KG/M2 | RESPIRATION RATE: 16 BRPM | TEMPERATURE: 98.6 F | SYSTOLIC BLOOD PRESSURE: 130 MMHG | HEART RATE: 112 BPM | DIASTOLIC BLOOD PRESSURE: 90 MMHG

## 2018-12-17 DIAGNOSIS — R19.7 DIARRHEA, UNSPECIFIED TYPE: Primary | ICD-10-CM

## 2018-12-17 PROCEDURE — 99213 OFFICE O/P EST LOW 20 MIN: CPT | Performed by: NURSE PRACTITIONER

## 2018-12-17 PROCEDURE — 3008F BODY MASS INDEX DOCD: CPT | Performed by: NURSE PRACTITIONER

## 2018-12-17 NOTE — LETTER
December 17, 2018     Patient: June Balderas   YOB: 1980   Date of Visit: 12/17/2018       To Whom it May Concern:    June Balderas is under my professional care  He was seen in my office on 12/17/2018  He may return 12/18/2018  If you have any questions or concerns, please don't hesitate to call           Sincerely,          Tyronne Bloch, CRNP        CC: No Recipients

## 2018-12-18 NOTE — PROGRESS NOTES
[facility]    ASSESSMENT AND PLAN     1  Diarrhea, unspecified type  78-year-old male presents today with 2 day history of diarrhea  It is almost resolved  He denies any other concerns  As I feel this is self-limiting, I do not feel any treatment is warranted at this time  Symptom management reviewed:  Maintain hydration, BRAT diet  He did request a note to return to work as he was out for the last 2 days  Note given  He is to return to the office should his symptoms persist or worsen  SUBJECTIVE       Patient ID: Chelsi Chappell is a 43 y o  male  Chief Complaint   Patient presents with    Diarrhea     upset stomach x 2 days       HISTORY OF PRESENT ILLNESS    Patient presents today with 2 day history of an upset stomach and diarrhea  He states he ate [employer] and it did not agree with him  He has not taken anything for it but his symptoms have mostly resolved  He stayed home from work secondary to his bowels and needs a note to return  The following portions of the patient's history were reviewed and updated as appropriate: allergies, current medications and past medical history  REVIEW OF SYSTEMS  Review of Systems   Gastrointestinal: Positive for diarrhea (Resolving)  Negative for abdominal distention, abdominal pain, constipation, nausea and vomiting  OBJECTIVE      VITAL SIGNS  /90 (BP Location: Left arm, Patient Position: Sitting, Cuff Size: Adult)   Pulse (!) 112   Temp 98 6 °F (37 °C) (Tympanic)   Resp 16   Ht 5' 8 9" (1 75 m)   Wt 69 9 kg (154 lb)   BMI 22 81 kg/m²       PHYSICAL EXAMINATION   Physical Exam   Constitutional: He is oriented to person, place, and time  He appears well-developed and well-nourished  Cardiovascular: Normal rate, regular rhythm and normal heart sounds  Pulmonary/Chest: Effort normal and breath sounds normal  No respiratory distress  Abdominal: Soft  Bowel sounds are normal  He exhibits no distension   There is no tenderness  Neurological: He is alert and oriented to person, place, and time  Psychiatric: He has a normal mood and affect   His behavior is normal  Judgment and thought content normal

## 2019-01-08 DIAGNOSIS — F90.0 ATTENTION DEFICIT HYPERACTIVITY DISORDER (ADHD), PREDOMINANTLY INATTENTIVE TYPE: ICD-10-CM

## 2019-01-08 RX ORDER — DEXTROAMPHETAMINE SACCHARATE, AMPHETAMINE ASPARTATE MONOHYDRATE, DEXTROAMPHETAMINE SULFATE AND AMPHETAMINE SULFATE 7.5; 7.5; 7.5; 7.5 MG/1; MG/1; MG/1; MG/1
30 CAPSULE, EXTENDED RELEASE ORAL 2 TIMES DAILY
Qty: 60 CAPSULE | Refills: 0 | Status: SHIPPED | OUTPATIENT
Start: 2019-01-08 | End: 2019-02-06 | Stop reason: SDUPTHER

## 2019-01-23 ENCOUNTER — OFFICE VISIT (OUTPATIENT)
Dept: FAMILY MEDICINE CLINIC | Facility: CLINIC | Age: 39
End: 2019-01-23
Payer: COMMERCIAL

## 2019-01-23 VITALS
OXYGEN SATURATION: 98 % | RESPIRATION RATE: 16 BRPM | SYSTOLIC BLOOD PRESSURE: 120 MMHG | WEIGHT: 156 LBS | HEART RATE: 98 BPM | DIASTOLIC BLOOD PRESSURE: 80 MMHG | TEMPERATURE: 97.8 F | HEIGHT: 68 IN | BODY MASS INDEX: 23.64 KG/M2

## 2019-01-23 DIAGNOSIS — R10.9 CRAMP, ABDOMINAL: Primary | ICD-10-CM

## 2019-01-23 PROCEDURE — 99213 OFFICE O/P EST LOW 20 MIN: CPT | Performed by: NURSE PRACTITIONER

## 2019-01-23 RX ORDER — DICYCLOMINE HYDROCHLORIDE 10 MG/1
10 CAPSULE ORAL
Qty: 60 CAPSULE | Refills: 1 | Status: SHIPPED | OUTPATIENT
Start: 2019-01-23 | End: 2019-03-11 | Stop reason: ALTCHOICE

## 2019-01-23 NOTE — LETTER
January 23, 2019     Patient: Marisela Frank   YOB: 1980   Date of Visit: 1/23/2019       To Whom it May Concern:    Marisela Frank is under my professional care  He was seen in my office on 1/23/2019  Please excuse from work today  He may return 1/24/2019  If you have any questions or concerns, please don't hesitate to call           Sincerely,          NICHOLAS Rogers        CC: No Recipients

## 2019-01-23 NOTE — PROGRESS NOTES
[facility]    ASSESSMENT AND PLAN     1  Cramp, abdominal  43-year-old male presents today for 3 day history abdominal cramping and diarrhea  This is the 2nd time he has been seen in this office for similar symptoms within the last month  He cannot identify any triggers  States he generally does not feel well secondary to this abdominal cramping  Physical assessment benign as below  Last lab work was May/2018 and was unremarkable  As his symptoms have persisted for several weeks, without much relief, will order abdominal CT to rule out any inflammatory bowel disease  Rx given today for Bentyl  Symptom management reviewed today for abdominal cramping and diarrhea  He is to be re-evaluated if his symptoms persist worsen prior to the abdominal scan     - dicyclomine (BENTYL) 10 mg capsule; Take 1 capsule (10 mg total) by mouth 3 (three) times a day before meals  Dispense: 60 capsule; Refill: 1  - CT abdomen pelvis w contrast; Future            SUBJECTIVE       Patient ID: Radha Parikh is a 43 y o  male  Chief Complaint   Patient presents with    Abdominal Pain     constipated, cramping x 5 days       HISTORY OF PRESENT ILLNESS    Patient presents today with a 5 day history of abdominal cramping and intermittent diarrhea  The diarrhea is watery  He was seen for  similar symptoms approximately 1 month ago  He states that these symptoms come and go, but this is the longest that they have lasted at anyone given time  He denies any fever  Denies any sinus and/or respiratory illness  Denies any other symptoms of infection  The following portions of the patient's history were reviewed and updated as appropriate: allergies, current medications, past medical history and problem list     REVIEW OF SYSTEMS  Review of Systems   Constitutional: Negative  Respiratory: Negative  Cardiovascular: Negative  Gastrointestinal: Positive for abdominal distention, abdominal pain and diarrhea  Negative for anal bleeding, blood in stool, constipation, nausea, rectal pain and vomiting  Genitourinary: Negative  Psychiatric/Behavioral: Negative  OBJECTIVE      VITAL SIGNS  /80 (BP Location: Left arm, Patient Position: Sitting, Cuff Size: Adult)   Pulse 98   Temp 97 8 °F (36 6 °C) (Tympanic)   Resp 16   Ht 5' 7 72" (1 72 m)   Wt 70 8 kg (156 lb)   SpO2 98%   BMI 23 92 kg/m²       PHYSICAL EXAMINATION   Physical Exam   Constitutional: Vital signs are normal  He appears well-developed and well-nourished  Abdominal: Soft  Normal appearance and bowel sounds are normal  He exhibits no mass  There is no hepatosplenomegaly  There is no tenderness  There is no rigidity, no rebound and no guarding  Psychiatric: He has a normal mood and affect  His speech is normal and behavior is normal  Judgment and thought content normal  Cognition and memory are normal    Nursing note and vitals reviewed

## 2019-01-24 ENCOUNTER — DOCUMENTATION (OUTPATIENT)
Dept: FAMILY MEDICINE CLINIC | Facility: CLINIC | Age: 39
End: 2019-01-24

## 2019-01-24 ENCOUNTER — TELEPHONE (OUTPATIENT)
Dept: FAMILY MEDICINE CLINIC | Facility: CLINIC | Age: 39
End: 2019-01-24

## 2019-01-24 NOTE — TELEPHONE ENCOUNTER
I spoke to [facility] pt was seen yesterday 1/23/2019 he was asking if his oow note could please be extended for today  I did walk back and speak to Sneha she did give me a verbal ok to do so  I informed pt that I would type it up and it will be up front ready for

## 2019-01-24 NOTE — PROGRESS NOTES
NO AUTH NEEDED FOR CT PER EWELINA AWAN  10:45AM 1/24/19  LM FOR PT   IF HE NEEDS THE CONTRAST HE CAN P/U HERE AS LONG AS HE SCHEDULES AT [facility]

## 2019-01-25 ENCOUNTER — TELEPHONE (OUTPATIENT)
Dept: FAMILY MEDICINE CLINIC | Facility: CLINIC | Age: 39
End: 2019-01-25

## 2019-01-25 DIAGNOSIS — I10 ESSENTIAL HYPERTENSION: Primary | ICD-10-CM

## 2019-01-25 NOTE — TELEPHONE ENCOUNTER
Pt is scheduled for CT 2/2/19  Due to his high BP he needs a BMP  Please put orders in for bw and we need to contact pt to schedule prior to 2/2/19

## 2019-01-30 ENCOUNTER — TELEPHONE (OUTPATIENT)
Dept: FAMILY MEDICINE CLINIC | Facility: CLINIC | Age: 39
End: 2019-01-30

## 2019-01-30 NOTE — TELEPHONE ENCOUNTER
Patient called the office, requesting office note  He states he is not feeling better  Please advise if that is okay  Patient is also requesting off note for tomorrow because he is coming here for blood work  Please advise if this okay

## 2019-01-31 ENCOUNTER — CLINICAL SUPPORT (OUTPATIENT)
Dept: FAMILY MEDICINE CLINIC | Facility: CLINIC | Age: 39
End: 2019-01-31
Payer: COMMERCIAL

## 2019-01-31 DIAGNOSIS — I10 ESSENTIAL HYPERTENSION: ICD-10-CM

## 2019-01-31 PROCEDURE — 36415 COLL VENOUS BLD VENIPUNCTURE: CPT | Performed by: NURSE PRACTITIONER

## 2019-02-01 LAB
BUN SERPL-MCNC: 11 MG/DL (ref 7–25)
BUN/CREAT SERPL: ABNORMAL (CALC) (ref 6–22)
CALCIUM SERPL-MCNC: 9.3 MG/DL (ref 8.6–10.3)
CHLORIDE SERPL-SCNC: 103 MMOL/L (ref 98–110)
CO2 SERPL-SCNC: 27 MMOL/L (ref 20–32)
CREAT SERPL-MCNC: 0.86 MG/DL (ref 0.6–1.35)
GLUCOSE SERPL-MCNC: 121 MG/DL (ref 65–99)
POTASSIUM SERPL-SCNC: 4.7 MMOL/L (ref 3.5–5.3)
SL AMB EGFR AFRICAN AMERICAN: 127 ML/MIN/1.73M2
SL AMB EGFR NON AFRICAN AMERICAN: 109 ML/MIN/1.73M2
SODIUM SERPL-SCNC: 135 MMOL/L (ref 135–146)

## 2019-02-02 ENCOUNTER — HOSPITAL ENCOUNTER (OUTPATIENT)
Dept: CT IMAGING | Facility: HOSPITAL | Age: 39
Discharge: HOME | End: 2019-02-02
Payer: COMMERCIAL

## 2019-02-02 DIAGNOSIS — R10.9 CRAMP, ABDOMINAL: ICD-10-CM

## 2019-02-02 PROCEDURE — 74177 CT ABD & PELVIS W/CONTRAST: CPT

## 2019-02-02 RX ADMIN — IOHEXOL 100 ML: 350 INJECTION, SOLUTION INTRAVENOUS at 12:26

## 2019-02-06 ENCOUNTER — TELEPHONE (OUTPATIENT)
Dept: FAMILY MEDICINE CLINIC | Facility: CLINIC | Age: 39
End: 2019-02-06

## 2019-02-06 DIAGNOSIS — F90.0 ATTENTION DEFICIT HYPERACTIVITY DISORDER (ADHD), PREDOMINANTLY INATTENTIVE TYPE: ICD-10-CM

## 2019-02-06 RX ORDER — DEXTROAMPHETAMINE SACCHARATE, AMPHETAMINE ASPARTATE MONOHYDRATE, DEXTROAMPHETAMINE SULFATE AND AMPHETAMINE SULFATE 7.5; 7.5; 7.5; 7.5 MG/1; MG/1; MG/1; MG/1
30 CAPSULE, EXTENDED RELEASE ORAL 2 TIMES DAILY
Qty: 60 CAPSULE | Refills: 0 | Status: SHIPPED | OUTPATIENT
Start: 2019-02-06 | End: 2019-03-11 | Stop reason: SDUPTHER

## 2019-02-06 NOTE — TELEPHONE ENCOUNTER
Pt called inquiring about the results of his CT   Justina Bumpers can you please review the results of pt's CT  They are in EPIC under CT Abdomen pelvis 02/02/2019 Pt's number is [phone]

## 2019-02-27 ENCOUNTER — OFFICE VISIT (OUTPATIENT)
Dept: FAMILY MEDICINE CLINIC | Facility: CLINIC | Age: 39
End: 2019-02-27
Payer: COMMERCIAL

## 2019-02-27 VITALS
RESPIRATION RATE: 18 BRPM | HEART RATE: 94 BPM | BODY MASS INDEX: 21.82 KG/M2 | DIASTOLIC BLOOD PRESSURE: 80 MMHG | TEMPERATURE: 97.2 F | WEIGHT: 152.4 LBS | OXYGEN SATURATION: 98 % | SYSTOLIC BLOOD PRESSURE: 130 MMHG | HEIGHT: 70 IN

## 2019-02-27 DIAGNOSIS — R68.89 FLU-LIKE SYMPTOMS: Primary | ICD-10-CM

## 2019-02-27 PROCEDURE — 99213 OFFICE O/P EST LOW 20 MIN: CPT | Performed by: PHYSICIAN ASSISTANT

## 2019-02-27 RX ORDER — OSELTAMIVIR PHOSPHATE 75 MG/1
75 CAPSULE ORAL EVERY 12 HOURS SCHEDULED
Qty: 10 CAPSULE | Refills: 0 | Status: SHIPPED | OUTPATIENT
Start: 2019-02-27 | End: 2019-03-04

## 2019-02-27 NOTE — PROGRESS NOTES
Assessment/Plan:      Diagnoses and all orders for this visit:    Flu-like symptoms  -     oseltamivir (TAMIFLU) 75 mg capsule; Take 1 capsule (75 mg total) by mouth every 12 (twelve) hours for 5 days        Patient is a 65-year-old male presenting today for acute onset URI and constitutional symptoms since this morning  He does not have a fever but took Aleve about an hour 2 before coming here and also had the flu vaccine  He does have symptoms consistent with influenza and I am clinically suspicious for this diagnosis  Risks versus benefits of treatment were discussed  He will start Tamiflu b i d  X5 days and I discussed potential side effects with him  He will continue DayQuil and NyQuil for symptomatic relief as well as Aleve or Advil for pain and fever control  Rest and hydration as well germ precautions discussed, work excuse given  I also reviewed his CT results of his abdomen that he had earlier this month which was unremarkable  He states he has attributed some of his unsettled abdominal pain and cramping 2 energy drinks which I explained caffeine could certainly cause hyperactive bowels, diarrhea, urgency stools and abdominal cramping  He has found some relief in cessation of the caffeine and he will continue to work on this  He will call with any problems  Chief Complaint   Patient presents with   [place] Edith Like Symptoms     cough/congestion/body aches/sore throat/started this AM       Subjective:     Patient ID: Megan Do is a 43 y o  male     38y/o male here today for sudden onset body aches, fatigue, weakness, coughing, sneezing since this Am  States family being tx for strep infection, culture results still pending, but feeling better within past 24 hours without antibiotic  Pt did get flu vaccine this year  No diarrhea or vomiting  No stomach pain  Did have the flu vaccine  He has tried [email or web address] dayquil, he last took aleve about 1-2 hours ago  He is also questioning his CT results    He had was having intermittent abdominal cramping and feeling uneasy for awhile  He had a CT scan earlier this month and states he never got results  He states overall he is feeling better and states that he has cut out caffeinated drinks which has helped significantly  He states he drinks about 2-3 caffeinated drinks a day  Review of Systems   Constitutional: Positive for activity change, appetite change and fatigue  HENT: Positive for ear pain, rhinorrhea, sneezing and sore throat  Respiratory: Positive for cough  Negative for chest tightness, shortness of breath and wheezing  Cardiovascular: Negative  Gastrointestinal: Negative  Musculoskeletal: Positive for myalgias  Neurological: Negative  The following portions of the patient's history were reviewed and updated as appropriate: allergies, current medications, past family history, past medical history, past social history, past surgical history and problem list       Objective:     Physical Exam   Constitutional: He is oriented to person, place, and time  He appears well-developed  He appears distressed (mild)  Appearing acutely ill, uncomfortable and slightly restless   HENT:   Head: Normocephalic  Right Ear: Tympanic membrane and ear canal normal    Left Ear: Tympanic membrane and ear canal normal    Nose: Rhinorrhea (clear) present  Mouth/Throat: Posterior oropharyngeal erythema (mild  no enlargement of tonsils) present  No oropharyngeal exudate or posterior oropharyngeal edema  Neck: Neck supple  Cardiovascular: Normal rate, regular rhythm and normal heart sounds  Pulmonary/Chest: Effort normal  He has wheezes (mild throughout)  Abdominal: Soft  Normal appearance and bowel sounds are normal  There is no tenderness  Lymphadenopathy:        Head (right side): No submandibular and no tonsillar adenopathy present  Head (left side): No submandibular and no tonsillar adenopathy present       He has no cervical adenopathy  Neurological: He is alert and oriented to person, place, and time  Psychiatric: He has a normal mood and affect  Vitals reviewed        Vitals:    02/27/19 1530   BP: 130/80   BP Location: Left arm   Patient Position: Sitting   Cuff Size: Adult   Pulse: 94   Resp: 18   Temp: (!) 97 2 °F (36 2 °C)   TempSrc: Tympanic   SpO2: 98%   Weight: 69 1 kg (152 lb 6 4 oz)   Height: 5' 9 5" (1 765 m)

## 2019-02-27 NOTE — LETTER
February 27, 2019     Patient: Yumiko Acosta   YOB: 1980   Date of Visit: 2/27/2019       To Whom it May Concern:    Yumiko Acosta is under my professional care  He was seen in my office on 2/27/2019  He may return to work on 3/3/19  If you have any questions or concerns, please don't hesitate to call           Sincerely,          Liv Rutledge PA-C        CC: No Recipients

## 2019-02-28 ENCOUNTER — TELEPHONE (OUTPATIENT)
Dept: FAMILY MEDICINE CLINIC | Facility: CLINIC | Age: 39
End: 2019-02-28

## 2019-02-28 NOTE — TELEPHONE ENCOUNTER
Patient called the office stating he was seen 2/27, states he is feeling better  He states he was told to call the office to let us know so we could update his work note  Patient would like to go back to work tomorrow 3/1/2019

## 2019-02-28 NOTE — TELEPHONE ENCOUNTER
Left message on cell phone for Loly to please call our office  If pt calls please advise him that his out of work note is ready for

## 2019-03-11 ENCOUNTER — OFFICE VISIT (OUTPATIENT)
Dept: FAMILY MEDICINE CLINIC | Facility: CLINIC | Age: 39
End: 2019-03-11
Payer: COMMERCIAL

## 2019-03-11 VITALS
HEART RATE: 93 BPM | BODY MASS INDEX: 23.79 KG/M2 | WEIGHT: 157 LBS | OXYGEN SATURATION: 98 % | RESPIRATION RATE: 16 BRPM | SYSTOLIC BLOOD PRESSURE: 130 MMHG | TEMPERATURE: 98.5 F | DIASTOLIC BLOOD PRESSURE: 90 MMHG | HEIGHT: 68 IN

## 2019-03-11 DIAGNOSIS — F90.0 ATTENTION DEFICIT HYPERACTIVITY DISORDER (ADHD), PREDOMINANTLY INATTENTIVE TYPE: ICD-10-CM

## 2019-03-11 DIAGNOSIS — J32.9 SINUSITIS, UNSPECIFIED CHRONICITY, UNSPECIFIED LOCATION: Primary | ICD-10-CM

## 2019-03-11 PROCEDURE — 99213 OFFICE O/P EST LOW 20 MIN: CPT | Performed by: NURSE PRACTITIONER

## 2019-03-11 PROCEDURE — 3008F BODY MASS INDEX DOCD: CPT | Performed by: NURSE PRACTITIONER

## 2019-03-11 RX ORDER — AMOXICILLIN AND CLAVULANATE POTASSIUM 875; 125 MG/1; MG/1
1 TABLET, FILM COATED ORAL EVERY 12 HOURS SCHEDULED
Qty: 14 TABLET | Refills: 0 | Status: SHIPPED | OUTPATIENT
Start: 2019-03-11 | End: 2019-03-18

## 2019-03-11 RX ORDER — DEXTROAMPHETAMINE SACCHARATE, AMPHETAMINE ASPARTATE MONOHYDRATE, DEXTROAMPHETAMINE SULFATE AND AMPHETAMINE SULFATE 7.5; 7.5; 7.5; 7.5 MG/1; MG/1; MG/1; MG/1
30 CAPSULE, EXTENDED RELEASE ORAL 2 TIMES DAILY
Qty: 60 CAPSULE | Refills: 0 | Status: SHIPPED | OUTPATIENT
Start: 2019-03-11 | End: 2019-04-12 | Stop reason: SDUPTHER

## 2019-03-11 NOTE — LETTER
March 12, 2019     Patient: Kenna Duggan   YOB: 1980   Date of Visit: 3/11/2019       To Whom it May Concern:    Kenna Duggan is under my professional care  He was seen in my office on 3/11/2019  Please excuse from work today  If you have any questions or concerns, please don't hesitate to call           Sincerely,          NICHOLAS Molina        CC: No Recipients

## 2019-03-11 NOTE — PROGRESS NOTES
[facility]    ASSESSMENT AND PLAN     1  Sinusitis, unspecified chronicity, unspecified location  71-year-old male presents today with signs and symptoms suggestive of sinusitis  Assessment as below  Rx given today for 7 day course of Augmentin  Dosage, usage and symptom management reviewed: Increased rest/fluids, OTC cough medicine, warm salt water gargles, Tylenol as needed  Note given to excuse from work  Advised he be re-evaluated should his symptoms persist and/or worsen  - amoxicillin-clavulanate (AUGMENTIN) 875-125 mg per tablet; Take 1 tablet by mouth every 12 (twelve) hours for 7 days  Dispense: 14 tablet; Refill: 0            SUBJECTIVE       Patient ID: Ten Maza is a 43 y o  male  Chief Complaint   Patient presents with    Cold Like Symptoms     cough,ST,HA,bodyache, congested x 2 weeks       HISTORY OF PRESENT ILLNESS    Patient presents today with headache, body aches, sore throat, congestion and a cough for the last 2 weeks  He states that the symptoms come and go  He states he was just treated for the flu, the end of February  He states he was given a prescription for Tamiflu, but never filled it  He managed his symptoms without  He states his symptoms did resolve after about 5 days, but then came back again and have been present since  He states that he is blowing his nose a lot and his mucus is dark yellow  He states that he thinks he has had a fever, but has not taken his temperature  He denies any GI distress  The following portions of the patient's history were reviewed and updated as appropriate: allergies, current medications, past medical history, past social history, past surgical history and problem list     REVIEW OF SYSTEMS  Review of Systems   Constitutional: Positive for activity change, appetite change, fatigue and fever  HENT: Positive for congestion, postnasal drip, sinus pressure and sore throat  Respiratory: Positive for cough  Negative for chest tightness, shortness of breath and wheezing  Cardiovascular: Negative  Gastrointestinal: Negative  Genitourinary: Negative  Neurological: Positive for headaches  Negative for dizziness  Psychiatric/Behavioral: Negative  OBJECTIVE      VITAL SIGNS  /90 (BP Location: Left arm, Patient Position: Sitting, Cuff Size: Adult)   Pulse 93   Temp 98 5 °F (36 9 °C) (Tympanic)   Resp 16   Ht 5' 7 72" (1 72 m)   Wt 71 2 kg (157 lb)   SpO2 98%   BMI 24 07 kg/m²     CURRENT MEDICATIONS    Current Outpatient Medications:     albuterol (PROVENTIL HFA) 90 mcg/act inhaler, Inhale 2 puffs every 6 (six) hours as needed for wheezing, Disp: 6 7 g, Rfl: 0    lisinopril (ZESTRIL) 20 mg tablet, Take 1 tablet (20 mg total) by mouth daily, Disp: 90 tablet, Rfl: 1    montelukast (SINGULAIR) 10 mg tablet, Take 1 tablet (10 mg total) by mouth daily at bedtime, Disp: 30 tablet, Rfl: 5    omeprazole (PriLOSEC) 40 MG capsule, Take 1 capsule (40 mg total) by mouth daily, Disp: 90 capsule, Rfl: 1    amoxicillin-clavulanate (AUGMENTIN) 875-125 mg per tablet, Take 1 tablet by mouth every 12 (twelve) hours for 7 days, Disp: 14 tablet, Rfl: 0    amphetamine-dextroamphetamine (ADDERALL XR) 30 MG 24 hr capsule, Take 1 capsule (30 mg total) by mouth 2 (two) times a dayMax Daily Amount: 60 mg, Disp: 60 capsule, Rfl: 0      PHYSICAL EXAMINATION   Physical Exam   Constitutional: He is oriented to person, place, and time  Vital signs are normal  He appears well-developed and well-nourished  HENT:   Head: Normocephalic  Right Ear: Hearing, tympanic membrane, external ear and ear canal normal  No middle ear effusion  Left Ear: Hearing, tympanic membrane, external ear and ear canal normal   No middle ear effusion  Nose: Mucosal edema present  Right sinus exhibits maxillary sinus tenderness  Left sinus exhibits maxillary sinus tenderness     Mouth/Throat: Uvula is midline, oropharynx is clear and moist and mucous membranes are normal    Eyes: Conjunctivae are normal    Cardiovascular: Normal rate and regular rhythm  Pulmonary/Chest: Effort normal and breath sounds normal  No respiratory distress  He has no decreased breath sounds  He has no wheezes  He has no rhonchi  Musculoskeletal: Normal range of motion  Lymphadenopathy:        Head (right side): No submental, no submandibular, no tonsillar, no preauricular, no posterior auricular and no occipital adenopathy present  Head (left side): No submental, no submandibular, no tonsillar, no preauricular, no posterior auricular and no occipital adenopathy present  He has no cervical adenopathy  Neurological: He is alert and oriented to person, place, and time  Skin: Skin is warm, dry and intact  Psychiatric: He has a normal mood and affect  His speech is normal and behavior is normal  Judgment and thought content normal  Cognition and memory are normal    Nursing note and vitals reviewed

## 2019-04-02 ENCOUNTER — OFFICE VISIT (OUTPATIENT)
Dept: FAMILY MEDICINE CLINIC | Facility: CLINIC | Age: 39
End: 2019-04-02
Payer: COMMERCIAL

## 2019-04-02 VITALS
TEMPERATURE: 98 F | SYSTOLIC BLOOD PRESSURE: 124 MMHG | WEIGHT: 157.4 LBS | DIASTOLIC BLOOD PRESSURE: 86 MMHG | HEIGHT: 68 IN | RESPIRATION RATE: 17 BRPM | HEART RATE: 92 BPM | BODY MASS INDEX: 23.86 KG/M2 | OXYGEN SATURATION: 97 %

## 2019-04-02 DIAGNOSIS — F41.8 DEPRESSION WITH ANXIETY: ICD-10-CM

## 2019-04-02 DIAGNOSIS — R10.84 GENERALIZED ABDOMINAL PAIN: Primary | ICD-10-CM

## 2019-04-02 PROCEDURE — 99214 OFFICE O/P EST MOD 30 MIN: CPT | Performed by: FAMILY MEDICINE

## 2019-04-02 PROCEDURE — 3008F BODY MASS INDEX DOCD: CPT | Performed by: FAMILY MEDICINE

## 2019-04-10 ENCOUNTER — TELEPHONE (OUTPATIENT)
Dept: FAMILY MEDICINE CLINIC | Facility: CLINIC | Age: 39
End: 2019-04-10

## 2019-04-10 DIAGNOSIS — F90.0 ATTENTION DEFICIT HYPERACTIVITY DISORDER (ADHD), PREDOMINANTLY INATTENTIVE TYPE: ICD-10-CM

## 2019-04-10 RX ORDER — DEXTROAMPHETAMINE SACCHARATE, AMPHETAMINE ASPARTATE MONOHYDRATE, DEXTROAMPHETAMINE SULFATE AND AMPHETAMINE SULFATE 7.5; 7.5; 7.5; 7.5 MG/1; MG/1; MG/1; MG/1
30 CAPSULE, EXTENDED RELEASE ORAL 2 TIMES DAILY
Qty: 60 CAPSULE | Refills: 0 | Status: CANCELLED | OUTPATIENT
Start: 2019-04-10

## 2019-04-12 DIAGNOSIS — F90.0 ATTENTION DEFICIT HYPERACTIVITY DISORDER (ADHD), PREDOMINANTLY INATTENTIVE TYPE: ICD-10-CM

## 2019-04-12 RX ORDER — DEXTROAMPHETAMINE SACCHARATE, AMPHETAMINE ASPARTATE MONOHYDRATE, DEXTROAMPHETAMINE SULFATE AND AMPHETAMINE SULFATE 7.5; 7.5; 7.5; 7.5 MG/1; MG/1; MG/1; MG/1
30 CAPSULE, EXTENDED RELEASE ORAL 2 TIMES DAILY
Qty: 60 CAPSULE | Refills: 0 | Status: SHIPPED | OUTPATIENT
Start: 2019-04-12 | End: 2019-05-04 | Stop reason: SDUPTHER

## 2019-04-29 DIAGNOSIS — F41.8 DEPRESSION WITH ANXIETY: ICD-10-CM

## 2019-04-29 DIAGNOSIS — I10 ESSENTIAL HYPERTENSION: ICD-10-CM

## 2019-04-29 RX ORDER — LISINOPRIL 20 MG/1
TABLET ORAL
Qty: 90 TABLET | Refills: 0 | Status: SHIPPED | OUTPATIENT
Start: 2019-04-29 | End: 2019-06-20 | Stop reason: SDUPTHER

## 2019-05-04 ENCOUNTER — OFFICE VISIT (OUTPATIENT)
Dept: FAMILY MEDICINE CLINIC | Facility: CLINIC | Age: 39
End: 2019-05-04
Payer: COMMERCIAL

## 2019-05-04 VITALS
WEIGHT: 150.8 LBS | OXYGEN SATURATION: 99 % | DIASTOLIC BLOOD PRESSURE: 100 MMHG | HEART RATE: 79 BPM | HEIGHT: 68 IN | TEMPERATURE: 97.5 F | SYSTOLIC BLOOD PRESSURE: 130 MMHG | BODY MASS INDEX: 22.85 KG/M2

## 2019-05-04 DIAGNOSIS — F41.8 DEPRESSION WITH ANXIETY: ICD-10-CM

## 2019-05-04 DIAGNOSIS — F90.0 ATTENTION DEFICIT HYPERACTIVITY DISORDER (ADHD), PREDOMINANTLY INATTENTIVE TYPE: ICD-10-CM

## 2019-05-04 DIAGNOSIS — K58.2 IRRITABLE BOWEL SYNDROME WITH BOTH CONSTIPATION AND DIARRHEA: Primary | ICD-10-CM

## 2019-05-04 PROCEDURE — 99214 OFFICE O/P EST MOD 30 MIN: CPT | Performed by: FAMILY MEDICINE

## 2019-05-04 RX ORDER — DEXTROAMPHETAMINE SACCHARATE, AMPHETAMINE ASPARTATE MONOHYDRATE, DEXTROAMPHETAMINE SULFATE AND AMPHETAMINE SULFATE 7.5; 7.5; 7.5; 7.5 MG/1; MG/1; MG/1; MG/1
30 CAPSULE, EXTENDED RELEASE ORAL 2 TIMES DAILY
Qty: 60 CAPSULE | Refills: 0 | Status: SHIPPED | OUTPATIENT
Start: 2019-05-11 | End: 2019-06-10 | Stop reason: SDUPTHER

## 2019-05-15 ENCOUNTER — OFFICE VISIT (OUTPATIENT)
Dept: FAMILY MEDICINE CLINIC | Facility: CLINIC | Age: 39
End: 2019-05-15
Payer: COMMERCIAL

## 2019-05-15 VITALS
OXYGEN SATURATION: 96 % | HEART RATE: 96 BPM | BODY MASS INDEX: 23.1 KG/M2 | SYSTOLIC BLOOD PRESSURE: 134 MMHG | HEIGHT: 68 IN | DIASTOLIC BLOOD PRESSURE: 88 MMHG | WEIGHT: 152.4 LBS | TEMPERATURE: 97.7 F | RESPIRATION RATE: 15 BRPM

## 2019-05-15 DIAGNOSIS — R11.0 NAUSEA: ICD-10-CM

## 2019-05-15 DIAGNOSIS — R10.84 GENERALIZED ABDOMINAL PAIN: Primary | ICD-10-CM

## 2019-05-15 PROCEDURE — 99214 OFFICE O/P EST MOD 30 MIN: CPT | Performed by: FAMILY MEDICINE

## 2019-05-15 RX ORDER — ONDANSETRON 4 MG/1
4 TABLET, ORALLY DISINTEGRATING ORAL EVERY 8 HOURS PRN
Qty: 20 TABLET | Refills: 0 | Status: SHIPPED | OUTPATIENT
Start: 2019-05-15 | End: 2019-06-29 | Stop reason: ALTCHOICE

## 2019-05-15 RX ORDER — DICYCLOMINE HCL 20 MG
20 TABLET ORAL 3 TIMES DAILY PRN
Qty: 30 TABLET | Refills: 0 | Status: SHIPPED | OUTPATIENT
Start: 2019-05-15 | End: 2019-06-29 | Stop reason: ALTCHOICE

## 2019-05-16 ENCOUNTER — TELEPHONE (OUTPATIENT)
Dept: FAMILY MEDICINE CLINIC | Facility: CLINIC | Age: 39
End: 2019-05-16

## 2019-05-17 ENCOUNTER — TELEPHONE (OUTPATIENT)
Dept: FAMILY MEDICINE CLINIC | Facility: CLINIC | Age: 39
End: 2019-05-17

## 2019-05-26 DIAGNOSIS — F41.8 DEPRESSION WITH ANXIETY: ICD-10-CM

## 2019-06-02 DIAGNOSIS — F41.8 DEPRESSION WITH ANXIETY: ICD-10-CM

## 2019-06-02 NOTE — TELEPHONE ENCOUNTER
Call patient  I had request to refill his sertraline  I did authorize the refill but I noticed that he did not come in for his appointment with Dr Haresh Mayfield  I thought he was going to see him regarding how he was doing on the increased dose of sertraline

## 2019-06-10 DIAGNOSIS — F90.0 ATTENTION DEFICIT HYPERACTIVITY DISORDER (ADHD), PREDOMINANTLY INATTENTIVE TYPE: ICD-10-CM

## 2019-06-10 RX ORDER — DEXTROAMPHETAMINE SACCHARATE, AMPHETAMINE ASPARTATE MONOHYDRATE, DEXTROAMPHETAMINE SULFATE AND AMPHETAMINE SULFATE 7.5; 7.5; 7.5; 7.5 MG/1; MG/1; MG/1; MG/1
30 CAPSULE, EXTENDED RELEASE ORAL 2 TIMES DAILY
Qty: 60 CAPSULE | Refills: 0 | Status: SHIPPED | OUTPATIENT
Start: 2019-06-10 | End: 2019-06-18 | Stop reason: SDUPTHER

## 2019-06-18 ENCOUNTER — OFFICE VISIT (OUTPATIENT)
Dept: FAMILY MEDICINE CLINIC | Facility: CLINIC | Age: 39
End: 2019-06-18
Payer: COMMERCIAL

## 2019-06-18 VITALS
SYSTOLIC BLOOD PRESSURE: 140 MMHG | OXYGEN SATURATION: 98 % | HEIGHT: 68 IN | WEIGHT: 145.6 LBS | TEMPERATURE: 97.6 F | BODY MASS INDEX: 22.07 KG/M2 | HEART RATE: 100 BPM | DIASTOLIC BLOOD PRESSURE: 90 MMHG

## 2019-06-18 DIAGNOSIS — F41.8 DEPRESSION WITH ANXIETY: Primary | ICD-10-CM

## 2019-06-18 DIAGNOSIS — F90.0 ATTENTION DEFICIT HYPERACTIVITY DISORDER (ADHD), PREDOMINANTLY INATTENTIVE TYPE: ICD-10-CM

## 2019-06-18 PROCEDURE — 99214 OFFICE O/P EST MOD 30 MIN: CPT | Performed by: FAMILY MEDICINE

## 2019-06-18 RX ORDER — BUPROPION HYDROCHLORIDE 150 MG/1
150 TABLET ORAL DAILY
Qty: 30 TABLET | Refills: 1 | Status: SHIPPED | OUTPATIENT
Start: 2019-06-18 | End: 2019-07-15 | Stop reason: SDUPTHER

## 2019-06-18 RX ORDER — DEXTROAMPHETAMINE SACCHARATE, AMPHETAMINE ASPARTATE MONOHYDRATE, DEXTROAMPHETAMINE SULFATE AND AMPHETAMINE SULFATE 5; 5; 5; 5 MG/1; MG/1; MG/1; MG/1
20 CAPSULE, EXTENDED RELEASE ORAL 2 TIMES DAILY
Qty: 60 CAPSULE | Refills: 0
Start: 2019-06-18 | End: 2019-07-09 | Stop reason: SDUPTHER

## 2019-06-20 ENCOUNTER — TELEPHONE (OUTPATIENT)
Dept: FAMILY MEDICINE CLINIC | Facility: CLINIC | Age: 39
End: 2019-06-20

## 2019-06-20 DIAGNOSIS — I10 ESSENTIAL HYPERTENSION: ICD-10-CM

## 2019-06-20 RX ORDER — LISINOPRIL 20 MG/1
20 TABLET ORAL DAILY
Qty: 90 TABLET | Refills: 0 | Status: SHIPPED | OUTPATIENT
Start: 2019-06-20 | End: 2019-12-05 | Stop reason: SDUPTHER

## 2019-06-29 ENCOUNTER — OFFICE VISIT (OUTPATIENT)
Dept: FAMILY MEDICINE CLINIC | Facility: CLINIC | Age: 39
End: 2019-06-29
Payer: COMMERCIAL

## 2019-06-29 VITALS
OXYGEN SATURATION: 98 % | BODY MASS INDEX: 23.54 KG/M2 | DIASTOLIC BLOOD PRESSURE: 90 MMHG | HEART RATE: 90 BPM | WEIGHT: 150 LBS | RESPIRATION RATE: 16 BRPM | TEMPERATURE: 98.2 F | HEIGHT: 67 IN | SYSTOLIC BLOOD PRESSURE: 130 MMHG

## 2019-06-29 DIAGNOSIS — I10 ESSENTIAL HYPERTENSION: Primary | ICD-10-CM

## 2019-06-29 DIAGNOSIS — R10.84 GENERALIZED ABDOMINAL PAIN: ICD-10-CM

## 2019-06-29 PROCEDURE — 99213 OFFICE O/P EST LOW 20 MIN: CPT | Performed by: NURSE PRACTITIONER

## 2019-06-29 RX ORDER — DICYCLOMINE HCL 20 MG
20 TABLET ORAL 3 TIMES DAILY PRN
Qty: 30 TABLET | Refills: 0 | Status: SHIPPED | OUTPATIENT
Start: 2019-06-29 | End: 2020-06-29 | Stop reason: ALTCHOICE

## 2019-07-02 DIAGNOSIS — F41.8 DEPRESSION WITH ANXIETY: ICD-10-CM

## 2019-07-09 DIAGNOSIS — F90.0 ATTENTION DEFICIT HYPERACTIVITY DISORDER (ADHD), PREDOMINANTLY INATTENTIVE TYPE: ICD-10-CM

## 2019-07-09 RX ORDER — DEXTROAMPHETAMINE SACCHARATE, AMPHETAMINE ASPARTATE MONOHYDRATE, DEXTROAMPHETAMINE SULFATE AND AMPHETAMINE SULFATE 5; 5; 5; 5 MG/1; MG/1; MG/1; MG/1
20 CAPSULE, EXTENDED RELEASE ORAL 2 TIMES DAILY
Qty: 60 CAPSULE | Refills: 0 | Status: SHIPPED | OUTPATIENT
Start: 2019-07-09 | End: 2019-08-07 | Stop reason: SDUPTHER

## 2019-07-09 NOTE — TELEPHONE ENCOUNTER
Pt called needs refilled        Amphetamine dextroamphetamine 24hr 20 mg cap  Take 1 cap by mouth 2 times a day  #60  [facility]    Pt is out    Also fax came thru [employer]    Sertraline hcl 50 mg tab  Take 1 tab daily  #90  [facility]      Last appt 6/29/19  Next appt 7/16/19

## 2019-07-15 DIAGNOSIS — F41.8 DEPRESSION WITH ANXIETY: ICD-10-CM

## 2019-07-15 RX ORDER — BUPROPION HYDROCHLORIDE 150 MG/1
TABLET ORAL
Qty: 30 TABLET | Refills: 1 | Status: SHIPPED | OUTPATIENT
Start: 2019-07-15 | End: 2019-08-15 | Stop reason: SDUPTHER

## 2019-08-05 DIAGNOSIS — F90.0 ATTENTION DEFICIT HYPERACTIVITY DISORDER (ADHD), PREDOMINANTLY INATTENTIVE TYPE: ICD-10-CM

## 2019-08-05 RX ORDER — DEXTROAMPHETAMINE SACCHARATE, AMPHETAMINE ASPARTATE MONOHYDRATE, DEXTROAMPHETAMINE SULFATE AND AMPHETAMINE SULFATE 5; 5; 5; 5 MG/1; MG/1; MG/1; MG/1
20 CAPSULE, EXTENDED RELEASE ORAL 2 TIMES DAILY
Qty: 60 CAPSULE | Refills: 0 | Status: CANCELLED | OUTPATIENT
Start: 2019-08-05

## 2019-08-07 ENCOUNTER — OFFICE VISIT (OUTPATIENT)
Dept: FAMILY MEDICINE CLINIC | Facility: CLINIC | Age: 39
End: 2019-08-07
Payer: COMMERCIAL

## 2019-08-07 VITALS
DIASTOLIC BLOOD PRESSURE: 78 MMHG | BODY MASS INDEX: 22.31 KG/M2 | OXYGEN SATURATION: 98 % | HEART RATE: 97 BPM | TEMPERATURE: 97.6 F | SYSTOLIC BLOOD PRESSURE: 120 MMHG | WEIGHT: 150.6 LBS | HEIGHT: 69 IN

## 2019-08-07 DIAGNOSIS — J06.9 UPPER RESPIRATORY TRACT INFECTION, UNSPECIFIED TYPE: Primary | ICD-10-CM

## 2019-08-07 DIAGNOSIS — F90.0 ATTENTION DEFICIT HYPERACTIVITY DISORDER (ADHD), PREDOMINANTLY INATTENTIVE TYPE: ICD-10-CM

## 2019-08-07 PROCEDURE — 99213 OFFICE O/P EST LOW 20 MIN: CPT | Performed by: FAMILY MEDICINE

## 2019-08-07 RX ORDER — AZITHROMYCIN 250 MG/1
TABLET, FILM COATED ORAL
Qty: 6 TABLET | Refills: 0 | Status: SHIPPED | OUTPATIENT
Start: 2019-08-07 | End: 2019-08-12

## 2019-08-07 RX ORDER — DEXTROAMPHETAMINE SACCHARATE, AMPHETAMINE ASPARTATE MONOHYDRATE, DEXTROAMPHETAMINE SULFATE AND AMPHETAMINE SULFATE 5; 5; 5; 5 MG/1; MG/1; MG/1; MG/1
20 CAPSULE, EXTENDED RELEASE ORAL 2 TIMES DAILY
Qty: 60 CAPSULE | Refills: 0 | Status: SHIPPED | OUTPATIENT
Start: 2019-08-07 | End: 2019-09-09 | Stop reason: SDUPTHER

## 2019-08-07 NOTE — LETTER
August 7, 2019     Patient: Dot Ellis   YOB: 1980   Date of Visit: 8/7/2019       To Whom it May Concern:    Dot Ellis is under my professional care  He was seen in my office on 8/7/2019  He may return to work on 8/9  If you have any questions or concerns, please don't hesitate to call           Sincerely,          Annville Bowels, DO        CC: No Recipients

## 2019-08-07 NOTE — PROGRESS NOTES
[facility]      NAME: Kenny Castillo  AGE: 35 y o  SEX: male  : 1980   MRN: [id number]    DATE: 2019  TIME: 7:02 PM    Assessment and Plan     Problem List Items Addressed This Visit     Attention deficit hyperactivity disorder (ADHD), predominantly inattentive type      ADD med is working well without difficulty  Continue on Adderall at 20 mg b i d  Relevant Medications    amphetamine-dextroamphetamine (ADDERALL XR) 20 MG 24 hr capsule      Other Visit Diagnoses     Upper respiratory tract infection, unspecified type    -  Primary    Relevant Medications    azithromycin (ZITHROMAX) 250 mg tablet              Return to office in:  6 months    Chief Complaint     Chief Complaint   Patient presents with    Other     Pt c/o bodyache, chough, sinus pressure, headache  Symptoms started this morning  History of Present Illness     Patient complains of 1 day history of chest  Congestion and cough productive of purulent sputum  He has postnasal drainage and sinus pressure  He has body aches  No fever chills or shortness of breath      The following portions of the patient's history were reviewed and updated as appropriate: allergies, current medications, past family history, past medical history, past social history, past surgical history and problem list     Review of Systems   Review of Systems   Constitutional: Negative  HENT: Positive for postnasal drip, rhinorrhea, sinus pressure, sinus pain and sore throat  Respiratory: Positive for cough and wheezing  Cardiovascular: Negative  Gastrointestinal: Negative  Genitourinary: Negative  Musculoskeletal: Negative  Psychiatric/Behavioral: Negative          Active Problem List     Patient Active Problem List   Diagnosis    Attention deficit hyperactivity disorder (ADHD), predominantly inattentive type    Essential hypertension    Osgood-Schlatter's disease    Generalized abdominal pain    Depression with anxiety       Objective   /78 (BP Location: Left arm, Patient Position: Sitting, Cuff Size: Adult)   Pulse 97   Temp 97 6 °F (36 4 °C) (Tympanic)   Ht 5' 9 17" (1 757 m)   Wt 68 3 kg (150 lb 9 6 oz)   SpO2 98%   BMI 22 13 kg/m²     Physical Exam   Constitutional: He is oriented to person, place, and time  He appears well-developed and well-nourished  No distress  HENT:   Head: Normocephalic and atraumatic  Injected pharynx with postnasal drainage   Eyes: Pupils are equal, round, and reactive to light  Conjunctivae are normal  Right eye exhibits no discharge  Neck: Normal range of motion  No thyromegaly present  Cardiovascular: Normal rate and regular rhythm  Pulmonary/Chest: Effort normal  No respiratory distress  He has wheezes  Lymphadenopathy:     He has no cervical adenopathy  Neurological: He is alert and oriented to person, place, and time  Skin: Skin is warm and dry  He is not diaphoretic  Psychiatric: He has a normal mood and affect  His behavior is normal  Judgment and thought content normal    Nursing note and vitals reviewed          Current Medications     Current Outpatient Medications:     amphetamine-dextroamphetamine (ADDERALL XR) 20 MG 24 hr capsule, Take 1 capsule (20 mg total) by mouth 2 (two) times a dayMax Daily Amount: 40 mg, Disp: 60 capsule, Rfl: 0    buPROPion (WELLBUTRIN XL) 150 mg 24 hr tablet, TAKE 1 TABLET BY MOUTH EVERY DAY, Disp: 30 tablet, Rfl: 1    dicyclomine (BENTYL) 20 mg tablet, Take 1 tablet (20 mg total) by mouth 3 (three) times a day as needed (for abdominal cramping), Disp: 30 tablet, Rfl: 0    lisinopril (ZESTRIL) 20 mg tablet, Take 1 tablet (20 mg total) by mouth daily, Disp: 90 tablet, Rfl: 0    omeprazole (PriLOSEC) 40 MG capsule, Take 1 capsule (40 mg total) by mouth daily, Disp: 90 capsule, Rfl: 1    albuterol (PROVENTIL HFA) 90 mcg/act inhaler, Inhale 2 puffs every 6 (six) hours as needed for wheezing (Patient not taking: Reported on 8/7/2019), Disp: 6 7 g, Rfl: 0    azithromycin (ZITHROMAX) 250 mg tablet, Take 2 tablets on day 1 then 1 tablet daily until finished, Disp: 6 tablet, Rfl: 0    sertraline (ZOLOFT) 50 mg tablet, TAKE 1 TABLET BY MOUTH EVERY DAY (Patient not taking: Reported on 8/7/2019), Disp: 30 tablet, Rfl: 0    Health Maintenance     Health Maintenance   Topic Date Due    INFLUENZA VACCINE  07/01/2019    Pneumococcal Vaccine: Pediatrics (0 to 5 Years) and At-Risk Patients (6 to 59 Years) (1 of 1 - PPSV23) 05/04/2020 (Originally 7/11/1985)    DTaP,Tdap,and Td Vaccines (1 - Tdap) 05/04/2020 (Originally 12/17/2010)    Depression Screening PHQ  02/27/2020    BMI: Adult  06/29/2020    Pneumococcal Vaccine: 65+ Years (1 of 2 - PCV13) 07/11/2044    HEPATITIS B VACCINES  Aged Out     Immunization History   Administered Date(s) Administered    INFLUENZA 10/15/2015, 10/20/2018    Influenza Quadrivalent, 6-35 Months IM 10/15/2015    Td (adult), adsorbed 12/16/2010       [facility]

## 2019-08-15 DIAGNOSIS — F41.8 DEPRESSION WITH ANXIETY: ICD-10-CM

## 2019-08-15 RX ORDER — BUPROPION HYDROCHLORIDE 150 MG/1
TABLET ORAL
Qty: 30 TABLET | Refills: 1 | Status: SHIPPED | OUTPATIENT
Start: 2019-08-15 | End: 2020-03-15 | Stop reason: ALTCHOICE

## 2019-08-29 ENCOUNTER — OFFICE VISIT (OUTPATIENT)
Dept: FAMILY MEDICINE CLINIC | Facility: CLINIC | Age: 39
End: 2019-08-29
Payer: COMMERCIAL

## 2019-08-29 VITALS
SYSTOLIC BLOOD PRESSURE: 118 MMHG | BODY MASS INDEX: 22.66 KG/M2 | OXYGEN SATURATION: 97 % | HEIGHT: 69 IN | HEART RATE: 100 BPM | TEMPERATURE: 98.2 F | WEIGHT: 153 LBS | DIASTOLIC BLOOD PRESSURE: 80 MMHG

## 2019-08-29 DIAGNOSIS — F41.8 DEPRESSION WITH ANXIETY: Primary | ICD-10-CM

## 2019-08-29 PROCEDURE — 99213 OFFICE O/P EST LOW 20 MIN: CPT | Performed by: NURSE PRACTITIONER

## 2019-08-29 NOTE — PROGRESS NOTES
[facility]    ASSESSMENT AND PLAN     1  Depression with anxiety  Presents today to discuss his depression/anxiety  Currently having personal issues with an ex-girlfriend causing increasing stress  Lengthy conversation about same  Discussed options  Recommend talk therapy  Patient is agreeable  He is going to reach out to his employer for information regarding their employee assistant program  Also recommend scheduling with Franny Urias in our office  Patient agreeable to both  Discussed increasing his Wellbutrin dose  Currently taking 150 XL daily  He would like to hold off on increasing to see if, as his situation resolved, his stress and anxiety return to baseline  Would like to see him back in the next few weeks to assess his symptoms  He is to return sooner if needed    - Ambulatory referral to behavioral health therapists; Future            SUBJECTIVE       Patient ID: Blade Avalos is a 35 y o  male  Chief Complaint   Patient presents with    Abdominal Pain     Pt c/o upset stomach, diarrhea going on for years  HISTORY OF PRESENT ILLNESS    Patient presents today to discuss his increasing stress and anxiety  He is currently going through some legal issues with an ex-girlfriend  He states that it has been a unhealthy and volatile relationship for the past 1 5 years  It has just been increasingly worsening over the last few months  As soon as his legal issues are resolved, he intends to stay way from her and break all ties  She currently has a PFA against him and they have to present in court on Tuesday  He is hoping at that point all charges will be dismissed and he can move on with his life  He is having a difficult time sleeping secondary to the stress and worry  His job is working with him, but he does have concern that he has missed many days  He thinks he needs to talk to someone to help him work through this          The following portions of the patient's history were reviewed and updated as appropriate: allergies, current medications, past family history, past medical history, past social history, past surgical history and problem list     REVIEW OF SYSTEMS  Review of Systems   Psychiatric/Behavioral: Positive for sleep disturbance  Negative for self-injury and suicidal ideas  The patient is nervous/anxious  OBJECTIVE      VITAL SIGNS  /80 (BP Location: Left arm, Patient Position: Sitting, Cuff Size: Adult)   Pulse 100   Temp 98 2 °F (36 8 °C) (Tympanic)   Ht 5' 9 13" (1 756 m)   Wt 69 4 kg (153 lb)   SpO2 97%   BMI 22 51 kg/m²     CURRENT MEDICATIONS    Current Outpatient Medications:     amphetamine-dextroamphetamine (ADDERALL XR) 20 MG 24 hr capsule, Take 1 capsule (20 mg total) by mouth 2 (two) times a dayMax Daily Amount: 40 mg, Disp: 60 capsule, Rfl: 0    buPROPion (WELLBUTRIN XL) 150 mg 24 hr tablet, TAKE 1 TABLET BY MOUTH EVERY DAY, Disp: 30 tablet, Rfl: 1    dicyclomine (BENTYL) 20 mg tablet, Take 1 tablet (20 mg total) by mouth 3 (three) times a day as needed (for abdominal cramping), Disp: 30 tablet, Rfl: 0    lisinopril (ZESTRIL) 20 mg tablet, Take 1 tablet (20 mg total) by mouth daily, Disp: 90 tablet, Rfl: 0    omeprazole (PriLOSEC) 40 MG capsule, Take 1 capsule (40 mg total) by mouth daily, Disp: 90 capsule, Rfl: 1      PHYSICAL EXAMINATION   Physical Exam   Constitutional: He is oriented to person, place, and time  Vital signs are normal  He appears well-developed and well-nourished  Neurological: He is alert and oriented to person, place, and time  Psychiatric: He has a normal mood and affect  Judgment and thought content normal    Nursing note and vitals reviewed

## 2019-08-29 NOTE — LETTER
August 29, 2019     Patient: Ji Franks   YOB: 1980   Date of Visit: 8/29/2019       To Whom it May Concern:    Ji Franks is under my professional care  He was seen in my office on 8/29/2019  Please excuse from work today  If you have any questions or concerns, please don't hesitate to call           Sincerely,          NICHOLAS Calles        CC: No Recipients

## 2019-09-09 ENCOUNTER — OFFICE VISIT (OUTPATIENT)
Dept: FAMILY MEDICINE CLINIC | Facility: CLINIC | Age: 39
End: 2019-09-09
Payer: COMMERCIAL

## 2019-09-09 VITALS
SYSTOLIC BLOOD PRESSURE: 128 MMHG | WEIGHT: 155 LBS | OXYGEN SATURATION: 97 % | TEMPERATURE: 98 F | HEART RATE: 109 BPM | HEIGHT: 69 IN | DIASTOLIC BLOOD PRESSURE: 82 MMHG | BODY MASS INDEX: 22.96 KG/M2

## 2019-09-09 DIAGNOSIS — G44.209 ACUTE NON INTRACTABLE TENSION-TYPE HEADACHE: Primary | ICD-10-CM

## 2019-09-09 DIAGNOSIS — F41.9 ANXIETY: Primary | ICD-10-CM

## 2019-09-09 DIAGNOSIS — F90.0 ATTENTION DEFICIT HYPERACTIVITY DISORDER (ADHD), PREDOMINANTLY INATTENTIVE TYPE: ICD-10-CM

## 2019-09-09 PROCEDURE — 99213 OFFICE O/P EST LOW 20 MIN: CPT | Performed by: NURSE PRACTITIONER

## 2019-09-09 PROCEDURE — 3008F BODY MASS INDEX DOCD: CPT | Performed by: NURSE PRACTITIONER

## 2019-09-09 RX ORDER — DEXTROAMPHETAMINE SACCHARATE, AMPHETAMINE ASPARTATE MONOHYDRATE, DEXTROAMPHETAMINE SULFATE AND AMPHETAMINE SULFATE 5; 5; 5; 5 MG/1; MG/1; MG/1; MG/1
20 CAPSULE, EXTENDED RELEASE ORAL 2 TIMES DAILY
Qty: 60 CAPSULE | Refills: 0 | Status: SHIPPED | OUTPATIENT
Start: 2019-09-09 | End: 2019-10-14 | Stop reason: SDUPTHER

## 2019-09-09 RX ORDER — IBUPROFEN 800 MG/1
800 TABLET ORAL EVERY 8 HOURS PRN
Qty: 30 TABLET | Refills: 0 | Status: SHIPPED | OUTPATIENT
Start: 2019-09-09 | End: 2022-04-27 | Stop reason: ALTCHOICE

## 2019-09-09 RX ORDER — MONTELUKAST SODIUM 10 MG/1
TABLET ORAL
COMMUNITY
Start: 2019-08-28 | End: 2020-03-15 | Stop reason: ALTCHOICE

## 2019-09-09 RX ORDER — ALPRAZOLAM 0.5 MG/1
0.5 TABLET ORAL
Qty: 20 TABLET | Refills: 0 | Status: SHIPPED | OUTPATIENT
Start: 2019-09-09 | End: 2019-12-05 | Stop reason: SDUPTHER

## 2019-09-09 NOTE — TELEPHONE ENCOUNTER
Please call patient back  They both have been sent [facility] [address] [facility] [address]

## 2019-09-09 NOTE — LETTER
September 9, 2019     Patient: Marco Freeman   YOB: 1980   Date of Visit: 9/9/2019       To Whom it May Concern:    Marco Freeman is under my professional care  He was seen in my office on 9/9/2019  Please excuse from work today  If you have any questions or concerns, please don't hesitate to call           Sincerely,          NICHOLAS Lamas        CC: No Recipients

## 2019-09-09 NOTE — TELEPHONE ENCOUNTER
Patient called from the pharmacy stating that his Adderall and Xanax was never called in  Would like an update    [facility] [address]

## 2019-09-09 NOTE — PROGRESS NOTES
[facility]    ASSESSMENT AND PLAN     1  Acute non intractable tension-type headache  Presents today with c/o headache  Neuro assessment negative as below  Started this morning at roughly 2:00 a m  Ca [place] Does have some light sensitivity  Symptoms suggestive of tension-type headache  Patient has been under a lot of personal stress over the last few weeks  Admits to taking ibuprofen 600 roughly 1 hour ago  Minimal relief  Stress reduction techniques reviewed  Rx today for Motrin 800 to be used as needed and a small dose of alprazolam, as it appears his headaches may be stress induced  ER precautions reviewed if symptoms persist or worsen    - ibuprofen (MOTRIN) 800 mg tablet; Take 1 tablet (800 mg total) by mouth every 8 (eight) hours as needed for mild pain  Dispense: 30 tablet; Refill: 0  - alprazolam (Xanax) 0 5 mg tablet    PMED verified by medical assistant  Note to excuse from work today    SUBJECTIVE       Patient ID: Nelwjeramie Grimes is a 35 y o  male  No chief complaint on file  HISTORY OF PRESENT ILLNESS    Patient presents today with complaint of headache  States it started roughly 2:00 a m  This morning  He woke up with it  Pain is primarily in his temporal region  His eyes were little light sensitive  No visual aura, disturbance and or double vision  Occasional nausea but no vomiting  Had additional stress this past weekend  He is still having legal issues with an ex-girlfriend  He attempted to get some of his belongings back this weekend and was unable to  This has been very stressful for him  He admits to not taking his medication yesterday but states he did take it this morning          The following portions of the patient's history were reviewed and updated as appropriate: allergies, current medications, past family history, past medical history, past social history, past surgical history and problem list     REVIEW OF SYSTEMS  Review of Systems   Eyes: Positive for photophobia  Negative for pain and visual disturbance  Neurological: Positive for headaches  Negative for dizziness, syncope, weakness and light-headedness  Psychiatric/Behavioral: Positive for sleep disturbance  The patient is nervous/anxious  OBJECTIVE      VITAL SIGNS  /82   Pulse (!) 109   Temp 98 °F (36 7 °C)   Ht 5' 9" (1 753 m)   Wt 70 3 kg (155 lb)   SpO2 97%   BMI 22 89 kg/m²     CURRENT MEDICATIONS    Current Outpatient Medications:     ALPRAZolam (XANAX) 0 5 mg tablet, Take 1 tablet (0 5 mg total) by mouth daily at bedtime as needed for anxiety, Disp: 20 tablet, Rfl: 0    amphetamine-dextroamphetamine (ADDERALL XR) 20 MG 24 hr capsule, Take 1 capsule (20 mg total) by mouth 2 (two) times a dayMax Daily Amount: 40 mg, Disp: 60 capsule, Rfl: 0    buPROPion (WELLBUTRIN XL) 150 mg 24 hr tablet, TAKE 1 TABLET BY MOUTH EVERY DAY, Disp: 30 tablet, Rfl: 1    dicyclomine (BENTYL) 20 mg tablet, Take 1 tablet (20 mg total) by mouth 3 (three) times a day as needed (for abdominal cramping), Disp: 30 tablet, Rfl: 0    ibuprofen (MOTRIN) 800 mg tablet, Take 1 tablet (800 mg total) by mouth every 8 (eight) hours as needed for mild pain, Disp: 30 tablet, Rfl: 0    lisinopril (ZESTRIL) 20 mg tablet, Take 1 tablet (20 mg total) by mouth daily, Disp: 90 tablet, Rfl: 0    montelukast (SINGULAIR) 10 mg tablet, , Disp: , Rfl:     omeprazole (PriLOSEC) 40 MG capsule, Take 1 capsule (40 mg total) by mouth daily, Disp: 90 capsule, Rfl: 1      PHYSICAL EXAMINATION   Physical Exam   Constitutional: He is oriented to person, place, and time  Vital signs are normal  He appears well-developed and well-nourished  Eyes: Pupils are equal, round, and reactive to light  Conjunctivae are normal    Cardiovascular: Regular rhythm  Tachycardia present  Pulmonary/Chest: Effort normal and breath sounds normal  No respiratory distress  Neurological: He is alert and oriented to person, place, and time  Psychiatric: He has a normal mood and affect  Judgment and thought content normal    Nursing note and vitals reviewed

## 2019-09-17 ENCOUNTER — TELEPHONE (OUTPATIENT)
Dept: FAMILY MEDICINE CLINIC | Facility: CLINIC | Age: 39
End: 2019-09-17

## 2019-09-17 DIAGNOSIS — K21.9 GASTROESOPHAGEAL REFLUX DISEASE, ESOPHAGITIS PRESENCE NOT SPECIFIED: ICD-10-CM

## 2019-09-17 NOTE — TELEPHONE ENCOUNTER
----- Message from [place] sent at 9/17/2019  9:37 AM EDT -----  Regarding: Non-Urgent Medical Question  Contact: [phone]  Arnulfo Hoover, I was wondering if you would be able to fill out FMLA papers for me because I'm on the verge of getting fired from my job  If you can I'd greatly appreciate it   Thanks

## 2019-09-18 ENCOUNTER — TELEPHONE (OUTPATIENT)
Dept: FAMILY MEDICINE CLINIC | Facility: CLINIC | Age: 39
End: 2019-09-18

## 2019-09-18 NOTE — TELEPHONE ENCOUNTER
Please contact patient  I did verify that information with our clinical coordinator, and was told that I cannot back date an FMLA

## 2019-09-18 NOTE — TELEPHONE ENCOUNTER
----- Message from [place] [facility] [place] sent at 9/18/2019  9:39 AM EDT -----  Regarding: Non-Urgent Medical Question  Contact: [phone]  I was told that they can be back dated as far as 60 days  I guess the information I got was inaccurate  And yes I have an appointment October 11th  That was the earliest one I could get

## 2019-09-18 NOTE — TELEPHONE ENCOUNTER
Spoke with patient and made him aware we could not back date FMLA  He is requesting we start new FMLA paperwork from this time going forward  Per Kat Haq, she would agree to a month at a time basis, and approve 1 day off during that month  Pt understands and will drop off paperwork

## 2019-09-24 ENCOUNTER — TELEPHONE (OUTPATIENT)
Dept: FAMILY MEDICINE CLINIC | Facility: CLINIC | Age: 39
End: 2019-09-24

## 2019-09-24 ENCOUNTER — DOCUMENTATION (OUTPATIENT)
Dept: FAMILY MEDICINE CLINIC | Facility: CLINIC | Age: 39
End: 2019-09-24

## 2019-09-24 DIAGNOSIS — F41.8 DEPRESSION WITH ANXIETY: ICD-10-CM

## 2019-09-24 NOTE — TELEPHONE ENCOUNTER
PT came in and needs refill for buPROPion (WELLBUTRIN XL) 150mg; takes 1 tablet by mouth daily  He also would like his pharmacy changed to the [facility] on [address]  [facility]

## 2019-09-30 RX ORDER — BUPROPION HYDROCHLORIDE 150 MG/1
150 TABLET ORAL DAILY
Qty: 30 TABLET | Refills: 2 | OUTPATIENT
Start: 2019-09-30

## 2019-10-07 DIAGNOSIS — J20.9 ACUTE BRONCHITIS, UNSPECIFIED ORGANISM: ICD-10-CM

## 2019-10-07 DIAGNOSIS — R06.2 WHEEZING: ICD-10-CM

## 2019-10-07 RX ORDER — MONTELUKAST SODIUM 10 MG/1
10 TABLET ORAL
Qty: 30 TABLET | Refills: 5 | Status: SHIPPED | OUTPATIENT
Start: 2019-10-07 | End: 2020-03-15 | Stop reason: ALTCHOICE

## 2019-10-14 DIAGNOSIS — F90.0 ATTENTION DEFICIT HYPERACTIVITY DISORDER (ADHD), PREDOMINANTLY INATTENTIVE TYPE: ICD-10-CM

## 2019-10-15 RX ORDER — DEXTROAMPHETAMINE SACCHARATE, AMPHETAMINE ASPARTATE MONOHYDRATE, DEXTROAMPHETAMINE SULFATE AND AMPHETAMINE SULFATE 5; 5; 5; 5 MG/1; MG/1; MG/1; MG/1
20 CAPSULE, EXTENDED RELEASE ORAL 2 TIMES DAILY
Qty: 30 CAPSULE | Refills: 0 | Status: SHIPPED | OUTPATIENT
Start: 2019-10-15 | End: 2019-11-01 | Stop reason: SDUPTHER

## 2019-11-01 DIAGNOSIS — F90.0 ATTENTION DEFICIT HYPERACTIVITY DISORDER (ADHD), PREDOMINANTLY INATTENTIVE TYPE: ICD-10-CM

## 2019-11-01 RX ORDER — DEXTROAMPHETAMINE SACCHARATE, AMPHETAMINE ASPARTATE MONOHYDRATE, DEXTROAMPHETAMINE SULFATE AND AMPHETAMINE SULFATE 5; 5; 5; 5 MG/1; MG/1; MG/1; MG/1
20 CAPSULE, EXTENDED RELEASE ORAL 2 TIMES DAILY
Qty: 60 CAPSULE | Refills: 0 | Status: SHIPPED | OUTPATIENT
Start: 2019-11-01 | End: 2019-12-05 | Stop reason: SDUPTHER

## 2019-12-05 DIAGNOSIS — F41.9 ANXIETY: ICD-10-CM

## 2019-12-05 DIAGNOSIS — F90.0 ATTENTION DEFICIT HYPERACTIVITY DISORDER (ADHD), PREDOMINANTLY INATTENTIVE TYPE: ICD-10-CM

## 2019-12-05 DIAGNOSIS — I10 ESSENTIAL HYPERTENSION: ICD-10-CM

## 2019-12-05 DIAGNOSIS — K21.9 GASTROESOPHAGEAL REFLUX DISEASE, ESOPHAGITIS PRESENCE NOT SPECIFIED: ICD-10-CM

## 2019-12-06 RX ORDER — ALPRAZOLAM 0.5 MG/1
0.5 TABLET ORAL
Qty: 20 TABLET | Refills: 0 | Status: SHIPPED | OUTPATIENT
Start: 2019-12-06 | End: 2020-04-12 | Stop reason: SDUPTHER

## 2019-12-06 RX ORDER — DEXTROAMPHETAMINE SACCHARATE, AMPHETAMINE ASPARTATE MONOHYDRATE, DEXTROAMPHETAMINE SULFATE AND AMPHETAMINE SULFATE 5; 5; 5; 5 MG/1; MG/1; MG/1; MG/1
20 CAPSULE, EXTENDED RELEASE ORAL 2 TIMES DAILY
Qty: 60 CAPSULE | Refills: 0 | Status: SHIPPED | OUTPATIENT
Start: 2019-12-06 | End: 2020-01-10 | Stop reason: SDUPTHER

## 2019-12-06 NOTE — TELEPHONE ENCOUNTER
Last seen 9/9/19  Per PDMP Adderall last filled 11/4/19 #60  Xanax last filled 9/9/19 #20  No future appts

## 2019-12-10 RX ORDER — OMEPRAZOLE 40 MG/1
40 CAPSULE, DELAYED RELEASE ORAL DAILY
Qty: 90 CAPSULE | Refills: 0 | Status: SHIPPED | OUTPATIENT
Start: 2019-12-10 | End: 2020-03-09

## 2019-12-10 RX ORDER — LISINOPRIL 20 MG/1
20 TABLET ORAL DAILY
Qty: 90 TABLET | Refills: 0 | Status: SHIPPED | OUTPATIENT
Start: 2019-12-10 | End: 2020-03-09

## 2020-01-10 DIAGNOSIS — F90.0 ATTENTION DEFICIT HYPERACTIVITY DISORDER (ADHD), PREDOMINANTLY INATTENTIVE TYPE: ICD-10-CM

## 2020-01-10 RX ORDER — DEXTROAMPHETAMINE SACCHARATE, AMPHETAMINE ASPARTATE MONOHYDRATE, DEXTROAMPHETAMINE SULFATE AND AMPHETAMINE SULFATE 5; 5; 5; 5 MG/1; MG/1; MG/1; MG/1
20 CAPSULE, EXTENDED RELEASE ORAL 2 TIMES DAILY
Qty: 60 CAPSULE | Refills: 0 | Status: SHIPPED | OUTPATIENT
Start: 2020-01-10 | End: 2020-02-05 | Stop reason: SDUPTHER

## 2020-02-05 ENCOUNTER — TELEPHONE (OUTPATIENT)
Dept: FAMILY MEDICINE CLINIC | Facility: CLINIC | Age: 40
End: 2020-02-05

## 2020-02-05 DIAGNOSIS — F90.0 ATTENTION DEFICIT HYPERACTIVITY DISORDER (ADHD), PREDOMINANTLY INATTENTIVE TYPE: ICD-10-CM

## 2020-02-05 RX ORDER — DEXTROAMPHETAMINE SACCHARATE, AMPHETAMINE ASPARTATE MONOHYDRATE, DEXTROAMPHETAMINE SULFATE AND AMPHETAMINE SULFATE 5; 5; 5; 5 MG/1; MG/1; MG/1; MG/1
20 CAPSULE, EXTENDED RELEASE ORAL 2 TIMES DAILY
Qty: 60 CAPSULE | Refills: 0 | Status: SHIPPED | OUTPATIENT
Start: 2020-02-10 | End: 2020-02-10 | Stop reason: SDUPTHER

## 2020-02-10 DIAGNOSIS — F90.0 ATTENTION DEFICIT HYPERACTIVITY DISORDER (ADHD), PREDOMINANTLY INATTENTIVE TYPE: ICD-10-CM

## 2020-02-10 RX ORDER — DEXTROAMPHETAMINE SACCHARATE, AMPHETAMINE ASPARTATE MONOHYDRATE, DEXTROAMPHETAMINE SULFATE AND AMPHETAMINE SULFATE 5; 5; 5; 5 MG/1; MG/1; MG/1; MG/1
20 CAPSULE, EXTENDED RELEASE ORAL 2 TIMES DAILY
Qty: 60 CAPSULE | Refills: 0 | Status: SHIPPED | OUTPATIENT
Start: 2020-02-10 | End: 2020-03-10 | Stop reason: SDUPTHER

## 2020-02-10 NOTE — TELEPHONE ENCOUNTER
PDMP checked, last filled 1/10/2020    Please resend RX to [facility]  RX cancelled at [facility]       Last appt: 8/7/2019  Next appt: none scheduled

## 2020-02-10 NOTE — TELEPHONE ENCOUNTER
[address] [facility] does not have BRAND NAME Adderal  So, please [facility] to [facility] [address]      He said he has an issue with this rx every single month

## 2020-02-11 NOTE — TELEPHONE ENCOUNTER
Pt called office very upset stating that 'office messed up on his Rx for Adderall'' I did inform pt that Rx was sent the same way it is sent every month  Pt states insurance is charging $126 to refill Rx  I called pharmacy to clarify any confusion and pharmacist stated that pt's insurance is the one charging copay  I called pt back and he was still upset stating that 'everything is his fault  Advised pt to contact insurance and find out why they are charging that much copay? Possible deductible?

## 2020-03-09 DIAGNOSIS — I10 ESSENTIAL HYPERTENSION: ICD-10-CM

## 2020-03-09 DIAGNOSIS — K21.9 GASTROESOPHAGEAL REFLUX DISEASE, ESOPHAGITIS PRESENCE NOT SPECIFIED: ICD-10-CM

## 2020-03-09 RX ORDER — LISINOPRIL 20 MG/1
TABLET ORAL
Qty: 90 TABLET | Refills: 0 | Status: SHIPPED | OUTPATIENT
Start: 2020-03-09 | End: 2020-04-12 | Stop reason: SDUPTHER

## 2020-03-09 RX ORDER — OMEPRAZOLE 40 MG/1
CAPSULE, DELAYED RELEASE ORAL
Qty: 90 CAPSULE | Refills: 0 | Status: SHIPPED | OUTPATIENT
Start: 2020-03-09 | End: 2020-05-17 | Stop reason: SDUPTHER

## 2020-03-09 NOTE — TELEPHONE ENCOUNTER
Please call patient  I did renew his medications, but he is due for a 6 month checkup    Please assist him in scheduling same

## 2020-03-10 DIAGNOSIS — F90.0 ATTENTION DEFICIT HYPERACTIVITY DISORDER (ADHD), PREDOMINANTLY INATTENTIVE TYPE: ICD-10-CM

## 2020-03-10 RX ORDER — DEXTROAMPHETAMINE SACCHARATE, AMPHETAMINE ASPARTATE MONOHYDRATE, DEXTROAMPHETAMINE SULFATE AND AMPHETAMINE SULFATE 5; 5; 5; 5 MG/1; MG/1; MG/1; MG/1
20 CAPSULE, EXTENDED RELEASE ORAL 2 TIMES DAILY
Qty: 60 CAPSULE | Refills: 0 | Status: SHIPPED | OUTPATIENT
Start: 2020-03-10 | End: 2020-04-12 | Stop reason: SDUPTHER

## 2020-03-10 NOTE — TELEPHONE ENCOUNTER
Pt called back from our phone call now needs refilled        Amphetamine dextroamphetamine 20 mg 24 hr cap  Take 1 cap by mouth 2 times a day   #60  [facility] [address]    Has appt tmw with senia

## 2020-03-11 ENCOUNTER — OFFICE VISIT (OUTPATIENT)
Dept: FAMILY MEDICINE CLINIC | Facility: CLINIC | Age: 40
End: 2020-03-11
Payer: COMMERCIAL

## 2020-03-11 VITALS
WEIGHT: 155.2 LBS | HEIGHT: 69 IN | OXYGEN SATURATION: 99 % | DIASTOLIC BLOOD PRESSURE: 102 MMHG | BODY MASS INDEX: 22.99 KG/M2 | TEMPERATURE: 97.7 F | SYSTOLIC BLOOD PRESSURE: 136 MMHG | HEART RATE: 99 BPM

## 2020-03-11 DIAGNOSIS — M77.8 TENDONITIS OF ELBOW, LEFT: ICD-10-CM

## 2020-03-11 DIAGNOSIS — F41.8 DEPRESSION WITH ANXIETY: Primary | ICD-10-CM

## 2020-03-11 DIAGNOSIS — F90.0 ATTENTION DEFICIT HYPERACTIVITY DISORDER (ADHD), PREDOMINANTLY INATTENTIVE TYPE: ICD-10-CM

## 2020-03-11 DIAGNOSIS — Z13.29 SCREENING FOR THYROID DISORDER: ICD-10-CM

## 2020-03-11 DIAGNOSIS — Z13.1 SCREENING FOR DIABETES MELLITUS: ICD-10-CM

## 2020-03-11 DIAGNOSIS — M25.50 ARTHRALGIA, UNSPECIFIED JOINT: ICD-10-CM

## 2020-03-11 DIAGNOSIS — Z13.6 SCREENING FOR CARDIOVASCULAR CONDITION: ICD-10-CM

## 2020-03-11 DIAGNOSIS — I10 ESSENTIAL HYPERTENSION: ICD-10-CM

## 2020-03-11 DIAGNOSIS — Z13.220 SCREENING FOR LIPID DISORDERS: ICD-10-CM

## 2020-03-11 PROCEDURE — 99214 OFFICE O/P EST MOD 30 MIN: CPT | Performed by: NURSE PRACTITIONER

## 2020-03-11 PROCEDURE — 3075F SYST BP GE 130 - 139MM HG: CPT | Performed by: NURSE PRACTITIONER

## 2020-03-11 PROCEDURE — 3080F DIAST BP >= 90 MM HG: CPT | Performed by: NURSE PRACTITIONER

## 2020-03-11 PROCEDURE — 4004F PT TOBACCO SCREEN RCVD TLK: CPT | Performed by: NURSE PRACTITIONER

## 2020-03-11 RX ORDER — HYDROCHLOROTHIAZIDE 25 MG/1
25 TABLET ORAL DAILY
Qty: 90 TABLET | Refills: 0 | Status: SHIPPED | OUTPATIENT
Start: 2020-03-11 | End: 2020-06-03

## 2020-03-11 RX ORDER — MELOXICAM 15 MG/1
15 TABLET ORAL DAILY
Qty: 30 TABLET | Refills: 0 | Status: SHIPPED | OUTPATIENT
Start: 2020-03-11 | End: 2020-06-29 | Stop reason: ALTCHOICE

## 2020-03-16 NOTE — PROGRESS NOTES
[facility]    ASSESSMENT AND PLAN     1  Depression with anxiety  Discussed his symptoms at length today  Does appears situational , but does not appear that his circumstances will change in the near future  Denies any suicidal and/or homicidal ideation  Reviewed medication/treatment options  Appears he has tried several SSRIs in the past, and he did not feel they were all effective  He thinks he may have been okay with sertraline  Will trial low-dose to start, 25 mg for the 1st 2 weeks  Increase to 50 if he is tolerating without issue  Monitor signs and symptoms  Return in 4-6 weeks for symptom re-evaluation  Certainly sooner if needed  - sertraline (ZOLOFT) 50 mg tablet; Take 1 tablet (50 mg total) by mouth daily Take 1/2 tab (25 mg) for the first 2 weeks  Then increase to 50mg daily  Dispense: 90 tablet; Refill: 0    2  Essential hypertension  Has been taking home Bps  Averaging 120-140/  Elevated 175/120 once  Had HA  Admits to taking additional lisinopril  States is BP dropped to 140/90 "sabino"  Had not elevated to that extent since  BP cuff compared to manual in office today  Systolic on machine average 20 more than manual   States he is compliant with his current lisinopril 20 daily  Remains elevated  Will add hydrochlorothiazide 25  Continue to take home BP is 1-2 times per day  And or if symptomatic  Will re-evaluate BP when returns for anxiety/depression checkup in 4-6 weeks  Sooner if needed  ER precautions discussed if BP elevates to high numbers again and he become symptomatic with headache as discussed above  - hydrochlorothiazide (HYDRODIURIL) 25 mg tablet; Take 1 tablet (25 mg total) by mouth daily  Dispense: 90 tablet; Refill: 0    3  Tendonitis of elbow, left  Assessment today consistent with likely tendinitis of left elbow  Symptom management reviewed  RICE  Rx today for meloxicam to use p r n     Avoid other NSAIDs during use    If symptoms persist, despite conservative treatment, recommend PT for further evaluation/treatment  - meloxicam (MOBIC) 15 mg tablet; Take 1 tablet (15 mg total) by mouth daily  Dispense: 30 tablet; Refill: 0    4  Screening for diabetes mellitus  Overdue for lab work  Fasting orders placed today  - Comprehensive metabolic panel; Future    5  Screening for thyroid disorder    - TSH, 3rd generation; Future    6  Screening for lipid disorders  Last lipid panel 2017  Within normal limits at that time    - Lipid panel; Future    7  Screening for cardiovascular condition    - CBC and differential; Future    8  Arthralgia, unspecified joint  Complains of intermittent joint pain for several months  States he has had tick exposures in recent past   Requesting Lyme panel be added to his routine lab work    - Lyme Antibody Profile with reflex to WB; Future    9  Attention deficit hyperactivity disorder (ADHD), predominantly inattentive type  Patient states doing relatively well on current Adderall XR 20 mg twice daily  Does feel he has some mildly decreased concentration in the afternoons  Trialing Zoloft for increased anxiety/depression  Discussed will not trial to changes at the same time  Assess Zoloft effectiveness in 4-6 weeks  Re-evaluate his ADHD symptoms at that time as well  Will not make any changes to his current treatment at this time      SUBJECTIVE       Patient ID: Ophelia Heimlich is a 35 y o  male  Chief Complaint   Patient presents with    Follow-up     6 month follow up, blood pressure check       HISTORY OF PRESENT ILLNESS    Patient presents today for six-month checkup  Feels he is doing relatively well on his Adderall, but does have periods during the day of decreased concentration  Was wondering if perhaps he needed to increase his dosing  He also notes some periods of depression/anxiety    He has struggled with this in the past and did trial some medication, but states it made him feel weird, so he stopped taking it   He is going through some situational stress right now  In the middle of a long, drawn out divorce  Still arguing over finances and their house  He is back living with his parents  He was having some difficulty at work  He was missing many days secondary to his stress and his relationship issues  He is currently off of probation at work  So this is decreased his stress somewhat  Complains also today of left elbow pain  It has been intermittent for the last several months  Feels pain on the outside of his elbow  Most notable during work when he has to do repeated movement        The following portions of the patient's history were reviewed and updated as appropriate: allergies, current medications, past family history, past medical history, past social history, past surgical history and problem list     REVIEW OF SYSTEMS  Review of Systems   Constitutional: Negative  HENT: Negative  Respiratory: Negative  Cardiovascular: Negative  Gastrointestinal: Negative  Musculoskeletal: Positive for arthralgias and myalgias  Neurological: Negative  Psychiatric/Behavioral: Positive for sleep disturbance  Negative for self-injury and suicidal ideas  The patient is nervous/anxious          OBJECTIVE      VITAL SIGNS  BP (!) 136/102 (BP Location: Left arm, Patient Position: Sitting, Cuff Size: Adult)   Pulse 99   Temp 97 7 °F (36 5 °C)   Ht 5' 8 5" (1 74 m)   Wt 70 4 kg (155 lb 3 2 oz)   SpO2 99%   BMI 23 25 kg/m²   BP recheck 138/96    CURRENT MEDICATIONS    Current Outpatient Medications:     ALPRAZolam (XANAX) 0 5 mg tablet, Take 1 tablet (0 5 mg total) by mouth daily at bedtime as needed for anxiety, Disp: 20 tablet, Rfl: 0    amphetamine-dextroamphetamine (ADDERALL XR) 20 MG 24 hr capsule, Take 1 capsule (20 mg total) by mouth 2 (two) times a dayMax Daily Amount: 40 mg, Disp: 60 capsule, Rfl: 0    dicyclomine (BENTYL) 20 mg tablet, Take 1 tablet (20 mg total) by mouth 3 (three) times a day as needed (for abdominal cramping), Disp: 30 tablet, Rfl: 0    ibuprofen (MOTRIN) 800 mg tablet, Take 1 tablet (800 mg total) by mouth every 8 (eight) hours as needed for mild pain, Disp: 30 tablet, Rfl: 0    lisinopril (ZESTRIL) 20 mg tablet, TAKE 1 TABLET BY MOUTH EVERY DAY, Disp: 90 tablet, Rfl: 0    omeprazole (PriLOSEC) 40 MG capsule, TAKE 1 CAPSULE BY MOUTH EVERY DAY, Disp: 90 capsule, Rfl: 0    hydrochlorothiazide (HYDRODIURIL) 25 mg tablet, Take 1 tablet (25 mg total) by mouth daily, Disp: 90 tablet, Rfl: 0    meloxicam (MOBIC) 15 mg tablet, Take 1 tablet (15 mg total) by mouth daily, Disp: 30 tablet, Rfl: 0    sertraline (ZOLOFT) 50 mg tablet, Take 1 tablet (50 mg total) by mouth daily Take 1/2 tab (25 mg) for the first 2 weeks  Then increase to 50mg daily, Disp: 90 tablet, Rfl: 0      PHYSICAL EXAMINATION   Physical Exam   Constitutional: He is oriented to person, place, and time  Vital signs are normal  He appears well-developed and well-nourished  HENT:   Right Ear: Tympanic membrane and ear canal normal    Left Ear: Tympanic membrane and ear canal normal    Nose: Nose normal    Neck: Carotid bruit is not present  Cardiovascular: Normal rate, regular rhythm and normal heart sounds  Pulmonary/Chest: Effort normal and breath sounds normal  No respiratory distress  Musculoskeletal:        Left elbow: He exhibits normal range of motion and no swelling  Tenderness found  Lateral epicondyle tenderness noted  Lymphadenopathy:        Head (right side): No submandibular and no tonsillar adenopathy present  Head (left side): No submandibular and no tonsillar adenopathy present  He has no cervical adenopathy  Neurological: He is alert and oriented to person, place, and time  Psychiatric: He has a normal mood and affect  Judgment and thought content normal    Nursing note and vitals reviewed

## 2020-04-04 DIAGNOSIS — M77.8 TENDONITIS OF ELBOW, LEFT: ICD-10-CM

## 2020-04-06 RX ORDER — MELOXICAM 15 MG/1
TABLET ORAL
Qty: 30 TABLET | Refills: 0 | OUTPATIENT
Start: 2020-04-06

## 2020-04-12 DIAGNOSIS — I10 ESSENTIAL HYPERTENSION: ICD-10-CM

## 2020-04-12 DIAGNOSIS — F90.0 ATTENTION DEFICIT HYPERACTIVITY DISORDER (ADHD), PREDOMINANTLY INATTENTIVE TYPE: ICD-10-CM

## 2020-04-12 DIAGNOSIS — F41.9 ANXIETY: ICD-10-CM

## 2020-04-13 RX ORDER — DEXTROAMPHETAMINE SACCHARATE, AMPHETAMINE ASPARTATE MONOHYDRATE, DEXTROAMPHETAMINE SULFATE AND AMPHETAMINE SULFATE 5; 5; 5; 5 MG/1; MG/1; MG/1; MG/1
20 CAPSULE, EXTENDED RELEASE ORAL 2 TIMES DAILY
Qty: 60 CAPSULE | Refills: 0 | Status: SHIPPED | OUTPATIENT
Start: 2020-04-13 | End: 2020-05-06 | Stop reason: SDUPTHER

## 2020-04-13 RX ORDER — LISINOPRIL 20 MG/1
20 TABLET ORAL DAILY
Qty: 90 TABLET | Refills: 0 | Status: SHIPPED | OUTPATIENT
Start: 2020-04-13 | End: 2020-05-17 | Stop reason: SDUPTHER

## 2020-04-13 RX ORDER — ALPRAZOLAM 0.5 MG/1
0.5 TABLET ORAL
Qty: 10 TABLET | Refills: 0 | Status: SHIPPED | OUTPATIENT
Start: 2020-04-13 | End: 2022-04-27 | Stop reason: ALTCHOICE

## 2020-04-14 DIAGNOSIS — M77.8 TENDONITIS OF ELBOW, LEFT: ICD-10-CM

## 2020-04-20 RX ORDER — MELOXICAM 15 MG/1
15 TABLET ORAL DAILY
Qty: 30 TABLET | Refills: 0 | OUTPATIENT
Start: 2020-04-20

## 2020-05-06 DIAGNOSIS — F90.0 ATTENTION DEFICIT HYPERACTIVITY DISORDER (ADHD), PREDOMINANTLY INATTENTIVE TYPE: ICD-10-CM

## 2020-05-06 DIAGNOSIS — F41.9 ANXIETY: ICD-10-CM

## 2020-05-07 RX ORDER — DEXTROAMPHETAMINE SACCHARATE, AMPHETAMINE ASPARTATE MONOHYDRATE, DEXTROAMPHETAMINE SULFATE AND AMPHETAMINE SULFATE 5; 5; 5; 5 MG/1; MG/1; MG/1; MG/1
20 CAPSULE, EXTENDED RELEASE ORAL 2 TIMES DAILY
Qty: 60 CAPSULE | Refills: 0 | Status: SHIPPED | OUTPATIENT
Start: 2020-05-07 | End: 2020-06-14 | Stop reason: SDUPTHER

## 2020-05-07 RX ORDER — ALPRAZOLAM 0.5 MG/1
0.5 TABLET ORAL
Qty: 10 TABLET | Refills: 0 | OUTPATIENT
Start: 2020-05-07

## 2020-05-17 DIAGNOSIS — I10 ESSENTIAL HYPERTENSION: ICD-10-CM

## 2020-05-17 DIAGNOSIS — K21.9 GASTROESOPHAGEAL REFLUX DISEASE, ESOPHAGITIS PRESENCE NOT SPECIFIED: ICD-10-CM

## 2020-05-19 RX ORDER — OMEPRAZOLE 40 MG/1
40 CAPSULE, DELAYED RELEASE ORAL DAILY
Qty: 90 CAPSULE | Refills: 0 | Status: SHIPPED | OUTPATIENT
Start: 2020-05-19 | End: 2020-08-12

## 2020-05-19 RX ORDER — LISINOPRIL 20 MG/1
20 TABLET ORAL DAILY
Qty: 90 TABLET | Refills: 0 | Status: SHIPPED | OUTPATIENT
Start: 2020-05-19 | End: 2020-07-09 | Stop reason: SDUPTHER

## 2020-06-02 DIAGNOSIS — F41.8 DEPRESSION WITH ANXIETY: ICD-10-CM

## 2020-06-03 DIAGNOSIS — I10 ESSENTIAL HYPERTENSION: ICD-10-CM

## 2020-06-03 RX ORDER — HYDROCHLOROTHIAZIDE 25 MG/1
TABLET ORAL
Qty: 90 TABLET | Refills: 0 | Status: SHIPPED | OUTPATIENT
Start: 2020-06-03 | End: 2020-07-09 | Stop reason: SDUPTHER

## 2020-06-14 DIAGNOSIS — F90.0 ATTENTION DEFICIT HYPERACTIVITY DISORDER (ADHD), PREDOMINANTLY INATTENTIVE TYPE: ICD-10-CM

## 2020-06-16 DIAGNOSIS — I10 ESSENTIAL HYPERTENSION: ICD-10-CM

## 2020-06-16 RX ORDER — HYDROCHLOROTHIAZIDE 25 MG/1
TABLET ORAL
Qty: 90 TABLET | Refills: 0 | OUTPATIENT
Start: 2020-06-16

## 2020-06-16 RX ORDER — DEXTROAMPHETAMINE SACCHARATE, AMPHETAMINE ASPARTATE MONOHYDRATE, DEXTROAMPHETAMINE SULFATE AND AMPHETAMINE SULFATE 5; 5; 5; 5 MG/1; MG/1; MG/1; MG/1
20 CAPSULE, EXTENDED RELEASE ORAL 2 TIMES DAILY
Qty: 60 CAPSULE | Refills: 0 | Status: SHIPPED | OUTPATIENT
Start: 2020-06-16 | End: 2020-07-16 | Stop reason: SDUPTHER

## 2020-06-29 ENCOUNTER — OFFICE VISIT (OUTPATIENT)
Dept: FAMILY MEDICINE CLINIC | Facility: CLINIC | Age: 40
End: 2020-06-29
Payer: COMMERCIAL

## 2020-06-29 VITALS
WEIGHT: 157.4 LBS | SYSTOLIC BLOOD PRESSURE: 138 MMHG | BODY MASS INDEX: 23.31 KG/M2 | HEART RATE: 89 BPM | TEMPERATURE: 98.4 F | HEIGHT: 69 IN | DIASTOLIC BLOOD PRESSURE: 104 MMHG | OXYGEN SATURATION: 97 %

## 2020-06-29 DIAGNOSIS — F90.0 ATTENTION DEFICIT HYPERACTIVITY DISORDER (ADHD), PREDOMINANTLY INATTENTIVE TYPE: ICD-10-CM

## 2020-06-29 DIAGNOSIS — Z72.89 ALCOHOL USE: ICD-10-CM

## 2020-06-29 DIAGNOSIS — I10 ESSENTIAL HYPERTENSION: Primary | ICD-10-CM

## 2020-06-29 DIAGNOSIS — Z72.0 TOBACCO ABUSE: ICD-10-CM

## 2020-06-29 PROCEDURE — 3075F SYST BP GE 130 - 139MM HG: CPT | Performed by: NURSE PRACTITIONER

## 2020-06-29 PROCEDURE — 4004F PT TOBACCO SCREEN RCVD TLK: CPT | Performed by: NURSE PRACTITIONER

## 2020-06-29 PROCEDURE — 99214 OFFICE O/P EST MOD 30 MIN: CPT | Performed by: NURSE PRACTITIONER

## 2020-06-29 PROCEDURE — 3008F BODY MASS INDEX DOCD: CPT | Performed by: NURSE PRACTITIONER

## 2020-06-29 PROCEDURE — 3080F DIAST BP >= 90 MM HG: CPT | Performed by: NURSE PRACTITIONER

## 2020-06-29 RX ORDER — TRAMADOL HYDROCHLORIDE 50 MG/1
50 TABLET ORAL EVERY 6 HOURS PRN
COMMUNITY
Start: 2020-04-29 | End: 2020-06-29 | Stop reason: ALTCHOICE

## 2020-06-29 RX ORDER — AMLODIPINE BESYLATE 5 MG/1
5 TABLET ORAL DAILY
Qty: 30 TABLET | Refills: 1 | Status: SHIPPED | OUTPATIENT
Start: 2020-06-29 | End: 2020-08-03

## 2020-07-09 ENCOUNTER — OFFICE VISIT (OUTPATIENT)
Dept: FAMILY MEDICINE CLINIC | Facility: CLINIC | Age: 40
End: 2020-07-09
Payer: COMMERCIAL

## 2020-07-09 VITALS
HEIGHT: 69 IN | BODY MASS INDEX: 22.87 KG/M2 | WEIGHT: 154.4 LBS | OXYGEN SATURATION: 98 % | HEART RATE: 94 BPM | DIASTOLIC BLOOD PRESSURE: 88 MMHG | SYSTOLIC BLOOD PRESSURE: 114 MMHG | TEMPERATURE: 98.2 F

## 2020-07-09 DIAGNOSIS — F90.0 ATTENTION DEFICIT HYPERACTIVITY DISORDER (ADHD), PREDOMINANTLY INATTENTIVE TYPE: ICD-10-CM

## 2020-07-09 DIAGNOSIS — Z72.0 TOBACCO ABUSE: ICD-10-CM

## 2020-07-09 DIAGNOSIS — I10 ESSENTIAL HYPERTENSION: Primary | ICD-10-CM

## 2020-07-09 PROCEDURE — 99213 OFFICE O/P EST LOW 20 MIN: CPT | Performed by: NURSE PRACTITIONER

## 2020-07-09 PROCEDURE — 3079F DIAST BP 80-89 MM HG: CPT | Performed by: NURSE PRACTITIONER

## 2020-07-09 PROCEDURE — 3074F SYST BP LT 130 MM HG: CPT | Performed by: NURSE PRACTITIONER

## 2020-07-09 PROCEDURE — 3008F BODY MASS INDEX DOCD: CPT | Performed by: NURSE PRACTITIONER

## 2020-07-09 PROCEDURE — 4004F PT TOBACCO SCREEN RCVD TLK: CPT | Performed by: NURSE PRACTITIONER

## 2020-07-09 RX ORDER — LISINOPRIL AND HYDROCHLOROTHIAZIDE 25; 20 MG/1; MG/1
1 TABLET ORAL DAILY
Qty: 90 TABLET | Refills: 0 | Status: SHIPPED | OUTPATIENT
Start: 2020-07-09 | End: 2020-07-15

## 2020-07-09 NOTE — PROGRESS NOTES
[facility]    ASSESSMENT AND PLAN     1  Essential hypertension  BP appears well controlled today  114/88 upon rooming  118/80 recheck  Remains asymptomatic  States compliant with current medication regime:  Amlodipine 5, lisinopril 20, hydrochlorothiazide 25 daily  Appears well controlled today  Appears able to return to work today without restriction  (He has been out, because his work would not let him return until his blood pressure was under 140/90)  Note given stating same  States he still needs to follow-up with his employer physician tomorrow and get clearance from them  Rx for lisinopril and hydrochlorothiazide given today  Will refill with combination med  Return in 6 months for routine checkup  Sooner if needed    - lisinopril-hydrochlorothiazide (PRINZIDE,ZESTORETIC) 20-25 MG per tablet; Take 1 tablet by mouth daily  Dispense: 90 tablet; Refill: 0    2  Attention deficit hyperactivity disorder (ADHD), predominantly inattentive type  Reviewed patient's symptoms today  Patient looking to explore medical marijuana as an option  He will contact office once he has looked closer into this  States he would prefer to take something natural verses the Adderall at this time  Will continue same Adderall dosing at this time  If he is going to remain on the Adderall, may consider trialing immediate release Adderall for his afternoon dose, in hopes that may promote better sleep at HS  3  Tobacco abuse  Patient states he is continuing to smoke, but has decreased his amount to less than 1 ppd  SUBJECTIVE       Patient ID: Sebastian Myers is a 35 y o  male  Chief Complaint   Patient presents with    Hypertension     Hypertension follow up       [address] [facility]    Patient presents today for blood pressure recheck, and clearance to return back to work  Patient has been out of work secondary to Ortho injury    He was cleared to return, and was evaluated by his employer doctor prior to returning in noted to have extremely elevated blood pressure  He has been taking his medication daily, and checking his blood pressure at home  States it has been averaging 120-130 over 80s  He denies any chest pain/pressure, tightness and or shortness of breath  He would also like to discuss his Adderall today  He has been noticing that he does not feel as well controlled as he used to  Notes occasional decreased ability to focus, especially in the afternoon  He used to be on 30 mg twice daily, but adjusted down on that secondary to decreased appetite and inability to sleep at night  He is considering looking into medical marijuana for symptom management        The following portions of the patient's history were reviewed and updated as appropriate: allergies, current medications, past family history, past medical history, past social history, past surgical history and problem list     REVIEW OF SYSTEMS  Review of Systems   Constitutional: Negative  HENT: Negative  Respiratory: Negative  Cardiovascular: Negative  Neurological: Negative  Psychiatric/Behavioral: Positive for decreased concentration and sleep disturbance  Negative for self-injury and suicidal ideas  The patient is not nervous/anxious          OBJECTIVE      VITAL SIGNS  /88 (BP Location: Right arm, Patient Position: Sitting, Cuff Size: Adult)   Pulse 94   Temp 98 2 °F (36 8 °C)   Ht 5' 8 9" (1 75 m)   Wt 70 kg (154 lb 6 4 oz)   SpO2 98%   BMI 22 87 kg/m²     CURRENT MEDICATIONS    Current Outpatient Medications:     ALPRAZolam (XANAX) 0 5 mg tablet, Take 1 tablet (0 5 mg total) by mouth daily at bedtime as needed for anxiety, Disp: 10 tablet, Rfl: 0    amLODIPine (NORVASC) 5 mg tablet, Take 1 tablet (5 mg total) by mouth daily, Disp: 30 tablet, Rfl: 1    amphetamine-dextroamphetamine (ADDERALL XR) 20 MG 24 hr capsule, Take 1 capsule (20 mg total) by mouth 2 (two) times a dayMax Daily Amount: 40 mg, Disp: 60 capsule, Rfl: 0    ibuprofen (MOTRIN) 800 mg tablet, Take 1 tablet (800 mg total) by mouth every 8 (eight) hours as needed for mild pain, Disp: 30 tablet, Rfl: 0    omeprazole (PriLOSEC) 40 MG capsule, Take 1 capsule (40 mg total) by mouth daily, Disp: 90 capsule, Rfl: 0    lisinopril-hydrochlorothiazide (PRINZIDE,ZESTORETIC) 20-25 MG per tablet, Take 1 tablet by mouth daily, Disp: 90 tablet, Rfl: 0      PHYSICAL EXAMINATION   Physical Exam   Constitutional: He is oriented to person, place, and time  Vital signs are normal  He appears well-developed and well-nourished  He does not appear ill  No distress  Cardiovascular: Normal rate and regular rhythm  Negative lower extremity edema   Pulmonary/Chest: Effort normal and breath sounds normal  No respiratory distress  Neurological: He is alert and oriented to person, place, and time  Psychiatric: He has a normal mood and affect  His speech is normal and behavior is normal    Nursing note and vitals reviewed

## 2020-07-09 NOTE — LETTER
July 9, 2020     Patient: Tex Deng   YOB: 1980   Date of Visit: 7/9/2020       To Whom it May Concern:    Tex Deng is under my professional care  He was seen in my office on 7/9/2020  His blood pressure today 118/80  He is cleared to return to work without restriction  If you have any questions or concerns, please don't hesitate to call           Sincerely,          NICHOLAS Pickett        CC: No Recipients

## 2020-07-15 DIAGNOSIS — I10 ESSENTIAL HYPERTENSION: ICD-10-CM

## 2020-07-15 DIAGNOSIS — F90.0 ATTENTION DEFICIT HYPERACTIVITY DISORDER (ADHD), PREDOMINANTLY INATTENTIVE TYPE: ICD-10-CM

## 2020-07-15 RX ORDER — LISINOPRIL AND HYDROCHLOROTHIAZIDE 25; 20 MG/1; MG/1
TABLET ORAL
Qty: 90 TABLET | Refills: 0 | Status: SHIPPED | OUTPATIENT
Start: 2020-07-15 | End: 2020-12-21

## 2020-07-15 NOTE — TELEPHONE ENCOUNTER
Pt called and stated [facility] was supposed to send his rx out the other day:    - amphetamine-dextroamphetamine (adderall xr) 20mg 24 hr capsule  Takes 1 capsule by mouth two times a day  Qty: 60 capsule      Send to [address]  Please advise

## 2020-07-16 DIAGNOSIS — F90.0 ATTENTION DEFICIT HYPERACTIVITY DISORDER (ADHD), PREDOMINANTLY INATTENTIVE TYPE: ICD-10-CM

## 2020-07-16 RX ORDER — DEXTROAMPHETAMINE SACCHARATE, AMPHETAMINE ASPARTATE MONOHYDRATE, DEXTROAMPHETAMINE SULFATE AND AMPHETAMINE SULFATE 5; 5; 5; 5 MG/1; MG/1; MG/1; MG/1
20 CAPSULE, EXTENDED RELEASE ORAL 2 TIMES DAILY
Qty: 60 CAPSULE | Refills: 0 | Status: SHIPPED | OUTPATIENT
Start: 2020-07-16 | End: 2020-08-17 | Stop reason: SDUPTHER

## 2020-08-01 DIAGNOSIS — I10 ESSENTIAL HYPERTENSION: ICD-10-CM

## 2020-08-03 RX ORDER — AMLODIPINE BESYLATE 5 MG/1
TABLET ORAL
Qty: 30 TABLET | Refills: 1 | Status: SHIPPED | OUTPATIENT
Start: 2020-08-03 | End: 2020-08-18 | Stop reason: SDUPTHER

## 2020-08-12 DIAGNOSIS — K21.9 GASTROESOPHAGEAL REFLUX DISEASE, ESOPHAGITIS PRESENCE NOT SPECIFIED: ICD-10-CM

## 2020-08-12 RX ORDER — OMEPRAZOLE 40 MG/1
CAPSULE, DELAYED RELEASE ORAL
Qty: 90 CAPSULE | Refills: 0 | Status: SHIPPED | OUTPATIENT
Start: 2020-08-12 | End: 2020-09-29 | Stop reason: SDUPTHER

## 2020-08-16 DIAGNOSIS — F90.0 ATTENTION DEFICIT HYPERACTIVITY DISORDER (ADHD), PREDOMINANTLY INATTENTIVE TYPE: ICD-10-CM

## 2020-08-16 RX ORDER — DEXTROAMPHETAMINE SACCHARATE, AMPHETAMINE ASPARTATE MONOHYDRATE, DEXTROAMPHETAMINE SULFATE AND AMPHETAMINE SULFATE 5; 5; 5; 5 MG/1; MG/1; MG/1; MG/1
20 CAPSULE, EXTENDED RELEASE ORAL 2 TIMES DAILY
Qty: 60 CAPSULE | Refills: 0 | Status: CANCELLED | OUTPATIENT
Start: 2020-08-16

## 2020-08-17 ENCOUNTER — TELEPHONE (OUTPATIENT)
Dept: OTHER | Facility: OTHER | Age: 40
End: 2020-08-17

## 2020-08-17 DIAGNOSIS — F90.0 ATTENTION DEFICIT HYPERACTIVITY DISORDER (ADHD), PREDOMINANTLY INATTENTIVE TYPE: ICD-10-CM

## 2020-08-17 RX ORDER — DEXTROAMPHETAMINE SACCHARATE, AMPHETAMINE ASPARTATE MONOHYDRATE, DEXTROAMPHETAMINE SULFATE AND AMPHETAMINE SULFATE 5; 5; 5; 5 MG/1; MG/1; MG/1; MG/1
20 CAPSULE, EXTENDED RELEASE ORAL 2 TIMES DAILY
Qty: 60 CAPSULE | Refills: 0 | Status: SHIPPED | OUTPATIENT
Start: 2020-08-17 | End: 2020-09-21 | Stop reason: SDUPTHER

## 2020-08-18 DIAGNOSIS — I10 ESSENTIAL HYPERTENSION: ICD-10-CM

## 2020-08-18 RX ORDER — AMLODIPINE BESYLATE 5 MG/1
5 TABLET ORAL DAILY
Qty: 90 TABLET | Refills: 0 | Status: SHIPPED | OUTPATIENT
Start: 2020-08-18 | End: 2020-11-09

## 2020-08-18 NOTE — TELEPHONE ENCOUNTER
Please note, according to chart records, patient requested medication refill 8/16 @ 5:51pm   Medication refill request was received by provider 8/17 @ 3:43pm and refilled same day at 11:34 pm

## 2020-08-18 NOTE — TELEPHONE ENCOUNTER
Patient called upset due to not beiing able to have his medication refill request and was calling to see if anythng could be done for him    I have spoke with a nurse and tried to call the patient back to explain that unfortunately we can not do anything  If needed he can be seen in ED for emergency dispensation   And that medication refills will always take minimum 48 hours to refill

## 2020-09-21 ENCOUNTER — TELEPHONE (OUTPATIENT)
Dept: FAMILY MEDICINE CLINIC | Facility: CLINIC | Age: 40
End: 2020-09-21

## 2020-09-21 DIAGNOSIS — F90.0 ATTENTION DEFICIT HYPERACTIVITY DISORDER (ADHD), PREDOMINANTLY INATTENTIVE TYPE: ICD-10-CM

## 2020-09-21 RX ORDER — DEXTROAMPHETAMINE SACCHARATE, AMPHETAMINE ASPARTATE MONOHYDRATE, DEXTROAMPHETAMINE SULFATE AND AMPHETAMINE SULFATE 5; 5; 5; 5 MG/1; MG/1; MG/1; MG/1
20 CAPSULE, EXTENDED RELEASE ORAL 2 TIMES DAILY
Qty: 60 CAPSULE | Refills: 0 | Status: SHIPPED | OUTPATIENT
Start: 2020-09-21 | End: 2020-10-15 | Stop reason: SDUPTHER

## 2020-09-21 NOTE — TELEPHONE ENCOUNTER
Pt called and needs a refill on:    - amphetamine- dextroamphetamine (adderall xr) 20 mg 24 hr capsule  Takes 1 capsule by mouth two times a day  Qty: 60 capsule        Send to [facility] on [address]

## 2020-09-29 ENCOUNTER — OFFICE VISIT (OUTPATIENT)
Dept: FAMILY MEDICINE CLINIC | Facility: CLINIC | Age: 40
End: 2020-09-29
Payer: COMMERCIAL

## 2020-09-29 VITALS
HEIGHT: 69 IN | HEART RATE: 97 BPM | DIASTOLIC BLOOD PRESSURE: 70 MMHG | WEIGHT: 152.6 LBS | TEMPERATURE: 98.4 F | BODY MASS INDEX: 22.6 KG/M2 | SYSTOLIC BLOOD PRESSURE: 114 MMHG | OXYGEN SATURATION: 98 % | RESPIRATION RATE: 18 BRPM

## 2020-09-29 DIAGNOSIS — K21.9 GASTROESOPHAGEAL REFLUX DISEASE, ESOPHAGITIS PRESENCE NOT SPECIFIED: ICD-10-CM

## 2020-09-29 DIAGNOSIS — R19.7 DIARRHEA, UNSPECIFIED TYPE: Primary | ICD-10-CM

## 2020-09-29 PROCEDURE — 99214 OFFICE O/P EST MOD 30 MIN: CPT | Performed by: FAMILY MEDICINE

## 2020-09-29 RX ORDER — OMEPRAZOLE 40 MG/1
40 CAPSULE, DELAYED RELEASE ORAL DAILY
Qty: 90 CAPSULE | Refills: 1 | Status: SHIPPED | OUTPATIENT
Start: 2020-09-29 | End: 2021-04-04

## 2020-09-29 NOTE — PROGRESS NOTES
Assessment/Plan:   1  Diarrhea, unspecified type  Reviewed patient's symptoms today  At this time, is unclear as to the exact cause of his diarrhea  He states that he specifically notices this with excessive milk as well as when he eats recall that she has  He does not notice this any other dairy products  Patient was advised that this does not appear due to primary lactose intolerance  As he would have these symptoms with any dairy intake  He may benefit from avoiding dairy products to assess if his symptoms improved  If he will have any dairy, he may benefit from taking Lactaid tablets prior to consuming these products  If his symptoms continue to persist, will consider further evaluation for this problem  2  Gastroesophageal reflux disease, esophagitis presence not specified  Stable today  Continue with dietary trigger avoidance as well as current treatment with omeprazole  - omeprazole (PriLOSEC) 40 MG capsule; Take 1 capsule (40 mg total) by mouth daily  Dispense: 90 capsule; Refill: 1           Diagnoses and all orders for this visit:    Gastroesophageal reflux disease, esophagitis presence not specified  -     omeprazole (PriLOSEC) 40 MG capsule; Take 1 capsule (40 mg total) by mouth daily          Subjective:       Chief Complaint   Patient presents with    Abdominal Pain     Pt thinks he has a lactose intolerance       Patient ID: Amaury Rodrigues is a 38 y o  male  Diarrhea    This is a new problem  The problem has been gradually improving  Associated symptoms include abdominal pain  Pertinent negatives include no arthralgias, chills, coughing, fever, headaches, increased  flatus, myalgias, vomiting or weight loss  Exacerbated by: White pizza  There are no known risk factors  He has tried bismuth subsalicylate for the symptoms  The treatment provided mild relief  Review of Systems   Constitutional: Negative for activity change, chills, fatigue, fever and weight loss     HENT: Negative for congestion, ear pain, sinus pressure and sore throat  Eyes: Negative for redness, itching and visual disturbance  Respiratory: Negative for cough and shortness of breath  Cardiovascular: Negative for chest pain and palpitations  Gastrointestinal: Positive for abdominal pain and diarrhea  Negative for flatus, nausea and vomiting  Endocrine: Negative for cold intolerance and heat intolerance  Genitourinary: Negative for dysuria, flank pain and frequency  Musculoskeletal: Negative for arthralgias, back pain, gait problem and myalgias  Skin: Negative for color change  Allergic/Immunologic: Negative for environmental allergies  Neurological: Negative for dizziness, numbness and headaches  Psychiatric/Behavioral: Negative for behavioral problems and sleep disturbance  The following portions of the patient's history were reviewed and updated as appropriate : past family history, past medical history, past social history and past surgical history      Current Outpatient Medications:     ALPRAZolam (XANAX) 0 5 mg tablet, Take 1 tablet (0 5 mg total) by mouth daily at bedtime as needed for anxiety, Disp: 10 tablet, Rfl: 0    amLODIPine (NORVASC) 5 mg tablet, Take 1 tablet (5 mg total) by mouth daily, Disp: 90 tablet, Rfl: 0    amphetamine-dextroamphetamine (ADDERALL XR) 20 MG 24 hr capsule, Take 1 capsule (20 mg total) by mouth 2 (two) times a dayMax Daily Amount: 40 mg, Disp: 60 capsule, Rfl: 0    ibuprofen (MOTRIN) 800 mg tablet, Take 1 tablet (800 mg total) by mouth every 8 (eight) hours as needed for mild pain, Disp: 30 tablet, Rfl: 0    lisinopril-hydrochlorothiazide (PRINZIDE,ZESTORETIC) 20-25 MG per tablet, TAKE 1 TABLET BY MOUTH EVERY DAY, Disp: 90 tablet, Rfl: 0    omeprazole (PriLOSEC) 40 MG capsule, TAKE 1 CAPSULE BY MOUTH EVERY DAY, Disp: 90 capsule, Rfl: 0         Objective:         Vitals:    09/29/20 1456   BP: 114/70   BP Location: Left arm   Patient Position: Sitting   Cuff Size: Adult   Pulse: 97   Resp: 18   Temp: 98 4 °F (36 9 °C)   TempSrc: Temporal   SpO2: 98%   Weight: 69 2 kg (152 lb 9 6 oz)   Height: 5' 8 9" (1 75 m)     Physical Exam  Vitals signs reviewed  Constitutional:       Appearance: He is well-developed  HENT:      Head: Normocephalic and atraumatic  Nose: Nose normal       Mouth/Throat:      Pharynx: No oropharyngeal exudate  Eyes:      General: No scleral icterus  Right eye: No discharge  Left eye: No discharge  Pupils: Pupils are equal, round, and reactive to light  Neck:      Musculoskeletal: Normal range of motion and neck supple  Trachea: No tracheal deviation  Cardiovascular:      Rate and Rhythm: Normal rate and regular rhythm  Pulses:           Dorsalis pedis pulses are 2+ on the right side and 2+ on the left side  Posterior tibial pulses are 2+ on the right side and 2+ on the left side  Heart sounds: Normal heart sounds  No murmur  No friction rub  No gallop  Pulmonary:      Effort: Pulmonary effort is normal  No respiratory distress  Breath sounds: Normal breath sounds  No wheezing or rales  Abdominal:      General: Bowel sounds are normal  There is no distension  Palpations: Abdomen is soft  Tenderness: There is no abdominal tenderness  There is no guarding or rebound  Musculoskeletal: Normal range of motion  Lymphadenopathy:      Head:      Right side of head: No submental or submandibular adenopathy  Left side of head: No submental or submandibular adenopathy  Cervical: No cervical adenopathy  Right cervical: No superficial, deep or posterior cervical adenopathy  Left cervical: No superficial, deep or posterior cervical adenopathy  Skin:     General: Skin is warm and dry  Findings: No erythema  Neurological:      Mental Status: He is alert and oriented to person, place, and time  Cranial Nerves: No cranial nerve deficit        Sensory: No sensory deficit  Psychiatric:         Mood and Affect: Mood is not anxious or depressed  Speech: Speech normal          Behavior: Behavior normal          Thought Content:  Thought content normal          Judgment: Judgment normal

## 2020-09-30 ENCOUNTER — TELEPHONE (OUTPATIENT)
Dept: FAMILY MEDICINE CLINIC | Facility: CLINIC | Age: 40
End: 2020-09-30

## 2020-09-30 NOTE — TELEPHONE ENCOUNTER
Patient is still not feeling well and would like his note revised to add today  Dr Audrey Davis note

## 2020-10-14 DIAGNOSIS — F90.0 ATTENTION DEFICIT HYPERACTIVITY DISORDER (ADHD), PREDOMINANTLY INATTENTIVE TYPE: ICD-10-CM

## 2020-10-14 RX ORDER — DEXTROAMPHETAMINE SACCHARATE, AMPHETAMINE ASPARTATE MONOHYDRATE, DEXTROAMPHETAMINE SULFATE AND AMPHETAMINE SULFATE 5; 5; 5; 5 MG/1; MG/1; MG/1; MG/1
20 CAPSULE, EXTENDED RELEASE ORAL 2 TIMES DAILY
Qty: 60 CAPSULE | Refills: 0 | Status: CANCELLED | OUTPATIENT
Start: 2020-10-14

## 2020-10-15 DIAGNOSIS — F90.0 ATTENTION DEFICIT HYPERACTIVITY DISORDER (ADHD), PREDOMINANTLY INATTENTIVE TYPE: ICD-10-CM

## 2020-10-15 RX ORDER — DEXTROAMPHETAMINE SACCHARATE, AMPHETAMINE ASPARTATE MONOHYDRATE, DEXTROAMPHETAMINE SULFATE AND AMPHETAMINE SULFATE 5; 5; 5; 5 MG/1; MG/1; MG/1; MG/1
20 CAPSULE, EXTENDED RELEASE ORAL 2 TIMES DAILY
Qty: 60 CAPSULE | Refills: 0 | Status: SHIPPED | OUTPATIENT
Start: 2020-10-15 | End: 2020-10-19 | Stop reason: DRUGHIGH

## 2020-10-19 ENCOUNTER — TELEPHONE (OUTPATIENT)
Dept: FAMILY MEDICINE CLINIC | Facility: CLINIC | Age: 40
End: 2020-10-19

## 2020-10-19 ENCOUNTER — OFFICE VISIT (OUTPATIENT)
Dept: FAMILY MEDICINE CLINIC | Facility: CLINIC | Age: 40
End: 2020-10-19
Payer: COMMERCIAL

## 2020-10-19 VITALS
BODY MASS INDEX: 23.37 KG/M2 | DIASTOLIC BLOOD PRESSURE: 94 MMHG | SYSTOLIC BLOOD PRESSURE: 124 MMHG | TEMPERATURE: 97.8 F | HEIGHT: 69 IN | WEIGHT: 157.8 LBS | OXYGEN SATURATION: 99 % | HEART RATE: 90 BPM

## 2020-10-19 DIAGNOSIS — R45.4 IRRITABILITY AND ANGER: Primary | ICD-10-CM

## 2020-10-19 DIAGNOSIS — Z72.0 TOBACCO ABUSE: ICD-10-CM

## 2020-10-19 DIAGNOSIS — F41.8 DEPRESSION WITH ANXIETY: ICD-10-CM

## 2020-10-19 DIAGNOSIS — Z72.89 ALCOHOL USE: ICD-10-CM

## 2020-10-19 DIAGNOSIS — F90.0 ATTENTION DEFICIT HYPERACTIVITY DISORDER (ADHD), PREDOMINANTLY INATTENTIVE TYPE: ICD-10-CM

## 2020-10-19 DIAGNOSIS — Z23 NEED FOR INFLUENZA VACCINATION: ICD-10-CM

## 2020-10-19 PROCEDURE — 99214 OFFICE O/P EST MOD 30 MIN: CPT | Performed by: NURSE PRACTITIONER

## 2020-10-19 PROCEDURE — 3080F DIAST BP >= 90 MM HG: CPT | Performed by: NURSE PRACTITIONER

## 2020-10-19 PROCEDURE — 90686 IIV4 VACC NO PRSV 0.5 ML IM: CPT | Performed by: NURSE PRACTITIONER

## 2020-10-19 PROCEDURE — 4004F PT TOBACCO SCREEN RCVD TLK: CPT | Performed by: NURSE PRACTITIONER

## 2020-10-19 PROCEDURE — 90471 IMMUNIZATION ADMIN: CPT | Performed by: NURSE PRACTITIONER

## 2020-10-19 RX ORDER — DEXTROAMPHETAMINE SACCHARATE, AMPHETAMINE ASPARTATE MONOHYDRATE, DEXTROAMPHETAMINE SULFATE AND AMPHETAMINE SULFATE 5; 5; 5; 5 MG/1; MG/1; MG/1; MG/1
20 CAPSULE, EXTENDED RELEASE ORAL EVERY MORNING
Qty: 30 CAPSULE | Refills: 0 | Status: SHIPPED | OUTPATIENT
Start: 2020-10-19 | End: 2020-11-18 | Stop reason: SDUPTHER

## 2020-10-19 RX ORDER — DEXTROAMPHETAMINE SACCHARATE, AMPHETAMINE ASPARTATE, DEXTROAMPHETAMINE SULFATE AND AMPHETAMINE SULFATE 2.5; 2.5; 2.5; 2.5 MG/1; MG/1; MG/1; MG/1
10 TABLET ORAL DAILY PRN
Qty: 30 TABLET | Refills: 0 | Status: SHIPPED | OUTPATIENT
Start: 2020-10-19 | End: 2020-11-18 | Stop reason: SDUPTHER

## 2020-11-09 DIAGNOSIS — I10 ESSENTIAL HYPERTENSION: ICD-10-CM

## 2020-11-09 RX ORDER — AMLODIPINE BESYLATE 5 MG/1
TABLET ORAL
Qty: 90 TABLET | Refills: 0 | Status: SHIPPED | OUTPATIENT
Start: 2020-11-09 | End: 2021-02-03 | Stop reason: SDUPTHER

## 2020-11-11 ENCOUNTER — OFFICE VISIT (OUTPATIENT)
Dept: FAMILY MEDICINE CLINIC | Facility: CLINIC | Age: 40
End: 2020-11-11
Payer: COMMERCIAL

## 2020-11-11 VITALS
HEIGHT: 69 IN | DIASTOLIC BLOOD PRESSURE: 84 MMHG | WEIGHT: 156.2 LBS | BODY MASS INDEX: 23.13 KG/M2 | TEMPERATURE: 97.5 F | HEART RATE: 105 BPM | OXYGEN SATURATION: 98 % | SYSTOLIC BLOOD PRESSURE: 138 MMHG

## 2020-11-11 DIAGNOSIS — I10 ESSENTIAL HYPERTENSION: ICD-10-CM

## 2020-11-11 DIAGNOSIS — F90.0 ATTENTION DEFICIT HYPERACTIVITY DISORDER (ADHD), PREDOMINANTLY INATTENTIVE TYPE: ICD-10-CM

## 2020-11-11 DIAGNOSIS — L08.9 SKIN INFECTION: Primary | ICD-10-CM

## 2020-11-11 DIAGNOSIS — H00.015 HORDEOLUM EXTERNUM OF LEFT LOWER EYELID: ICD-10-CM

## 2020-11-11 DIAGNOSIS — R45.4 IRRITABILITY AND ANGER: ICD-10-CM

## 2020-11-11 DIAGNOSIS — Z72.89 ALCOHOL USE: ICD-10-CM

## 2020-11-11 PROCEDURE — 3008F BODY MASS INDEX DOCD: CPT | Performed by: NURSE PRACTITIONER

## 2020-11-11 PROCEDURE — 4004F PT TOBACCO SCREEN RCVD TLK: CPT | Performed by: NURSE PRACTITIONER

## 2020-11-11 PROCEDURE — 99214 OFFICE O/P EST MOD 30 MIN: CPT | Performed by: NURSE PRACTITIONER

## 2020-11-11 RX ORDER — OFLOXACIN 3 MG/ML
1 SOLUTION/ DROPS OPHTHALMIC 4 TIMES DAILY
Qty: 2.5 ML | Refills: 0 | Status: SHIPPED | OUTPATIENT
Start: 2020-11-11 | End: 2022-04-27 | Stop reason: ALTCHOICE

## 2020-11-11 RX ORDER — CEPHALEXIN 500 MG/1
1000 CAPSULE ORAL EVERY 12 HOURS SCHEDULED
Qty: 40 CAPSULE | Refills: 0 | Status: SHIPPED | OUTPATIENT
Start: 2020-11-11 | End: 2020-11-21

## 2020-11-12 ENCOUNTER — TELEPHONE (OUTPATIENT)
Dept: FAMILY MEDICINE CLINIC | Facility: CLINIC | Age: 40
End: 2020-11-12

## 2020-11-16 ENCOUNTER — OFFICE VISIT (OUTPATIENT)
Dept: URGENT CARE | Facility: CLINIC | Age: 40
End: 2020-11-16
Payer: COMMERCIAL

## 2020-11-16 VITALS
HEART RATE: 72 BPM | TEMPERATURE: 97.2 F | OXYGEN SATURATION: 98 % | SYSTOLIC BLOOD PRESSURE: 136 MMHG | DIASTOLIC BLOOD PRESSURE: 72 MMHG | RESPIRATION RATE: 20 BRPM

## 2020-11-16 DIAGNOSIS — R19.7 DIARRHEA, UNSPECIFIED TYPE: Primary | ICD-10-CM

## 2020-11-16 PROCEDURE — G0381 LEV 2 HOSP TYPE B ED VISIT: HCPCS | Performed by: NURSE PRACTITIONER

## 2020-11-18 DIAGNOSIS — F90.0 ATTENTION DEFICIT HYPERACTIVITY DISORDER (ADHD), PREDOMINANTLY INATTENTIVE TYPE: ICD-10-CM

## 2020-11-20 RX ORDER — DEXTROAMPHETAMINE SACCHARATE, AMPHETAMINE ASPARTATE MONOHYDRATE, DEXTROAMPHETAMINE SULFATE AND AMPHETAMINE SULFATE 5; 5; 5; 5 MG/1; MG/1; MG/1; MG/1
20 CAPSULE, EXTENDED RELEASE ORAL EVERY MORNING
Qty: 30 CAPSULE | Refills: 0 | Status: SHIPPED | OUTPATIENT
Start: 2020-11-20 | End: 2020-12-18 | Stop reason: SDUPTHER

## 2020-11-20 RX ORDER — DEXTROAMPHETAMINE SACCHARATE, AMPHETAMINE ASPARTATE, DEXTROAMPHETAMINE SULFATE AND AMPHETAMINE SULFATE 2.5; 2.5; 2.5; 2.5 MG/1; MG/1; MG/1; MG/1
10 TABLET ORAL DAILY PRN
Qty: 30 TABLET | Refills: 0 | Status: SHIPPED | OUTPATIENT
Start: 2020-11-20 | End: 2020-12-18 | Stop reason: SDUPTHER

## 2020-12-18 DIAGNOSIS — F90.0 ATTENTION DEFICIT HYPERACTIVITY DISORDER (ADHD), PREDOMINANTLY INATTENTIVE TYPE: ICD-10-CM

## 2020-12-20 DIAGNOSIS — F90.0 ATTENTION DEFICIT HYPERACTIVITY DISORDER (ADHD), PREDOMINANTLY INATTENTIVE TYPE: ICD-10-CM

## 2020-12-21 DIAGNOSIS — I10 ESSENTIAL HYPERTENSION: ICD-10-CM

## 2020-12-21 RX ORDER — LISINOPRIL AND HYDROCHLOROTHIAZIDE 25; 20 MG/1; MG/1
TABLET ORAL
Qty: 90 TABLET | Refills: 0 | Status: SHIPPED | OUTPATIENT
Start: 2020-12-21 | End: 2021-02-19 | Stop reason: SDUPTHER

## 2020-12-21 RX ORDER — DEXTROAMPHETAMINE SACCHARATE, AMPHETAMINE ASPARTATE, DEXTROAMPHETAMINE SULFATE AND AMPHETAMINE SULFATE 2.5; 2.5; 2.5; 2.5 MG/1; MG/1; MG/1; MG/1
10 TABLET ORAL DAILY PRN
Qty: 30 TABLET | Refills: 0 | Status: SHIPPED | OUTPATIENT
Start: 2020-12-21 | End: 2021-01-19 | Stop reason: SDUPTHER

## 2020-12-21 RX ORDER — DEXTROAMPHETAMINE SACCHARATE, AMPHETAMINE ASPARTATE MONOHYDRATE, DEXTROAMPHETAMINE SULFATE AND AMPHETAMINE SULFATE 5; 5; 5; 5 MG/1; MG/1; MG/1; MG/1
20 CAPSULE, EXTENDED RELEASE ORAL EVERY MORNING
Qty: 30 CAPSULE | Refills: 0 | Status: SHIPPED | OUTPATIENT
Start: 2020-12-21 | End: 2021-01-19 | Stop reason: SDUPTHER

## 2020-12-22 RX ORDER — DEXTROAMPHETAMINE SACCHARATE, AMPHETAMINE ASPARTATE MONOHYDRATE, DEXTROAMPHETAMINE SULFATE AND AMPHETAMINE SULFATE 5; 5; 5; 5 MG/1; MG/1; MG/1; MG/1
20 CAPSULE, EXTENDED RELEASE ORAL EVERY MORNING
Qty: 30 CAPSULE | Refills: 0 | OUTPATIENT
Start: 2020-12-22

## 2020-12-22 RX ORDER — DEXTROAMPHETAMINE SACCHARATE, AMPHETAMINE ASPARTATE, DEXTROAMPHETAMINE SULFATE AND AMPHETAMINE SULFATE 2.5; 2.5; 2.5; 2.5 MG/1; MG/1; MG/1; MG/1
10 TABLET ORAL DAILY PRN
Qty: 30 TABLET | Refills: 0 | OUTPATIENT
Start: 2020-12-22

## 2021-01-19 DIAGNOSIS — F90.0 ATTENTION DEFICIT HYPERACTIVITY DISORDER (ADHD), PREDOMINANTLY INATTENTIVE TYPE: ICD-10-CM

## 2021-01-20 RX ORDER — DEXTROAMPHETAMINE SACCHARATE, AMPHETAMINE ASPARTATE, DEXTROAMPHETAMINE SULFATE AND AMPHETAMINE SULFATE 2.5; 2.5; 2.5; 2.5 MG/1; MG/1; MG/1; MG/1
10 TABLET ORAL DAILY PRN
Qty: 30 TABLET | Refills: 0 | Status: SHIPPED | OUTPATIENT
Start: 2021-01-20 | End: 2021-02-19 | Stop reason: SDUPTHER

## 2021-01-20 RX ORDER — DEXTROAMPHETAMINE SACCHARATE, AMPHETAMINE ASPARTATE MONOHYDRATE, DEXTROAMPHETAMINE SULFATE AND AMPHETAMINE SULFATE 5; 5; 5; 5 MG/1; MG/1; MG/1; MG/1
20 CAPSULE, EXTENDED RELEASE ORAL EVERY MORNING
Qty: 30 CAPSULE | Refills: 0 | Status: SHIPPED | OUTPATIENT
Start: 2021-01-20 | End: 2021-02-19 | Stop reason: SDUPTHER

## 2021-02-03 DIAGNOSIS — I10 ESSENTIAL HYPERTENSION: ICD-10-CM

## 2021-02-03 RX ORDER — AMLODIPINE BESYLATE 5 MG/1
5 TABLET ORAL DAILY
Qty: 90 TABLET | Refills: 0 | Status: SHIPPED | OUTPATIENT
Start: 2021-02-03 | End: 2021-06-21 | Stop reason: SDUPTHER

## 2021-02-18 DIAGNOSIS — I10 ESSENTIAL HYPERTENSION: ICD-10-CM

## 2021-02-18 DIAGNOSIS — F90.0 ATTENTION DEFICIT HYPERACTIVITY DISORDER (ADHD), PREDOMINANTLY INATTENTIVE TYPE: ICD-10-CM

## 2021-02-18 RX ORDER — DEXTROAMPHETAMINE SACCHARATE, AMPHETAMINE ASPARTATE MONOHYDRATE, DEXTROAMPHETAMINE SULFATE AND AMPHETAMINE SULFATE 5; 5; 5; 5 MG/1; MG/1; MG/1; MG/1
20 CAPSULE, EXTENDED RELEASE ORAL EVERY MORNING
Qty: 30 CAPSULE | Refills: 0 | Status: CANCELLED | OUTPATIENT
Start: 2021-02-18

## 2021-02-18 RX ORDER — LISINOPRIL AND HYDROCHLOROTHIAZIDE 25; 20 MG/1; MG/1
1 TABLET ORAL DAILY
Qty: 90 TABLET | Refills: 0 | Status: CANCELLED | OUTPATIENT
Start: 2021-02-18

## 2021-02-18 RX ORDER — DEXTROAMPHETAMINE SACCHARATE, AMPHETAMINE ASPARTATE, DEXTROAMPHETAMINE SULFATE AND AMPHETAMINE SULFATE 2.5; 2.5; 2.5; 2.5 MG/1; MG/1; MG/1; MG/1
10 TABLET ORAL DAILY PRN
Qty: 30 TABLET | Refills: 0 | Status: CANCELLED | OUTPATIENT
Start: 2021-02-18

## 2021-02-19 DIAGNOSIS — I10 ESSENTIAL HYPERTENSION: ICD-10-CM

## 2021-02-19 DIAGNOSIS — F90.0 ATTENTION DEFICIT HYPERACTIVITY DISORDER (ADHD), PREDOMINANTLY INATTENTIVE TYPE: ICD-10-CM

## 2021-02-19 RX ORDER — LISINOPRIL AND HYDROCHLOROTHIAZIDE 25; 20 MG/1; MG/1
1 TABLET ORAL DAILY
Qty: 90 TABLET | Refills: 1 | Status: SHIPPED | OUTPATIENT
Start: 2021-02-19 | End: 2021-06-21 | Stop reason: SDUPTHER

## 2021-02-19 RX ORDER — DEXTROAMPHETAMINE SACCHARATE, AMPHETAMINE ASPARTATE, DEXTROAMPHETAMINE SULFATE AND AMPHETAMINE SULFATE 2.5; 2.5; 2.5; 2.5 MG/1; MG/1; MG/1; MG/1
10 TABLET ORAL DAILY PRN
Qty: 30 TABLET | Refills: 0 | Status: SHIPPED | OUTPATIENT
Start: 2021-02-19 | End: 2021-03-18 | Stop reason: SDUPTHER

## 2021-02-19 RX ORDER — DEXTROAMPHETAMINE SACCHARATE, AMPHETAMINE ASPARTATE MONOHYDRATE, DEXTROAMPHETAMINE SULFATE AND AMPHETAMINE SULFATE 5; 5; 5; 5 MG/1; MG/1; MG/1; MG/1
20 CAPSULE, EXTENDED RELEASE ORAL EVERY MORNING
Qty: 30 CAPSULE | Refills: 0 | Status: SHIPPED | OUTPATIENT
Start: 2021-02-19 | End: 2021-03-18 | Stop reason: SDUPTHER

## 2021-03-18 ENCOUNTER — OFFICE VISIT (OUTPATIENT)
Dept: FAMILY MEDICINE CLINIC | Facility: CLINIC | Age: 41
End: 2021-03-18
Payer: COMMERCIAL

## 2021-03-18 VITALS
WEIGHT: 160.8 LBS | BODY MASS INDEX: 23.02 KG/M2 | OXYGEN SATURATION: 98 % | RESPIRATION RATE: 16 BRPM | SYSTOLIC BLOOD PRESSURE: 124 MMHG | HEIGHT: 70 IN | TEMPERATURE: 97.9 F | HEART RATE: 100 BPM | DIASTOLIC BLOOD PRESSURE: 84 MMHG

## 2021-03-18 DIAGNOSIS — Z72.0 TOBACCO USE: ICD-10-CM

## 2021-03-18 DIAGNOSIS — Z13.29 SCREENING FOR THYROID DISORDER: ICD-10-CM

## 2021-03-18 DIAGNOSIS — Z13.220 SCREENING FOR LIPID DISORDERS: ICD-10-CM

## 2021-03-18 DIAGNOSIS — F90.0 ATTENTION DEFICIT HYPERACTIVITY DISORDER (ADHD), PREDOMINANTLY INATTENTIVE TYPE: ICD-10-CM

## 2021-03-18 DIAGNOSIS — Z72.89 ALCOHOL USE: ICD-10-CM

## 2021-03-18 DIAGNOSIS — I10 ESSENTIAL HYPERTENSION: ICD-10-CM

## 2021-03-18 DIAGNOSIS — Z13.1 SCREENING FOR DIABETES MELLITUS: ICD-10-CM

## 2021-03-18 DIAGNOSIS — Z13.6 SCREENING FOR CARDIOVASCULAR CONDITION: ICD-10-CM

## 2021-03-18 DIAGNOSIS — J45.909 MODERATE ASTHMA WITHOUT COMPLICATION, UNSPECIFIED WHETHER PERSISTENT: ICD-10-CM

## 2021-03-18 DIAGNOSIS — R19.7 DIARRHEA, UNSPECIFIED TYPE: Primary | ICD-10-CM

## 2021-03-18 PROCEDURE — 3008F BODY MASS INDEX DOCD: CPT | Performed by: NURSE PRACTITIONER

## 2021-03-18 PROCEDURE — 3074F SYST BP LT 130 MM HG: CPT | Performed by: NURSE PRACTITIONER

## 2021-03-18 PROCEDURE — 4004F PT TOBACCO SCREEN RCVD TLK: CPT | Performed by: NURSE PRACTITIONER

## 2021-03-18 PROCEDURE — 3079F DIAST BP 80-89 MM HG: CPT | Performed by: NURSE PRACTITIONER

## 2021-03-18 PROCEDURE — 99214 OFFICE O/P EST MOD 30 MIN: CPT | Performed by: NURSE PRACTITIONER

## 2021-03-18 RX ORDER — FLUTICASONE FUROATE AND VILANTEROL 100; 25 UG/1; UG/1
1 POWDER RESPIRATORY (INHALATION) DAILY
Qty: 1 INHALER | Refills: 0 | Status: SHIPPED | OUTPATIENT
Start: 2021-03-18 | End: 2022-04-27 | Stop reason: SDUPTHER

## 2021-03-18 RX ORDER — DEXTROAMPHETAMINE SACCHARATE, AMPHETAMINE ASPARTATE, DEXTROAMPHETAMINE SULFATE AND AMPHETAMINE SULFATE 2.5; 2.5; 2.5; 2.5 MG/1; MG/1; MG/1; MG/1
10 TABLET ORAL DAILY PRN
Qty: 30 TABLET | Refills: 0 | Status: SHIPPED | OUTPATIENT
Start: 2021-03-18 | End: 2021-04-27 | Stop reason: SDUPTHER

## 2021-03-18 RX ORDER — DEXTROAMPHETAMINE SACCHARATE, AMPHETAMINE ASPARTATE MONOHYDRATE, DEXTROAMPHETAMINE SULFATE AND AMPHETAMINE SULFATE 5; 5; 5; 5 MG/1; MG/1; MG/1; MG/1
20 CAPSULE, EXTENDED RELEASE ORAL EVERY MORNING
Qty: 30 CAPSULE | Refills: 0 | Status: CANCELLED | OUTPATIENT
Start: 2021-03-18

## 2021-03-18 RX ORDER — DEXTROAMPHETAMINE SACCHARATE, AMPHETAMINE ASPARTATE, DEXTROAMPHETAMINE SULFATE AND AMPHETAMINE SULFATE 2.5; 2.5; 2.5; 2.5 MG/1; MG/1; MG/1; MG/1
10 TABLET ORAL DAILY PRN
Qty: 30 TABLET | Refills: 0 | Status: CANCELLED | OUTPATIENT
Start: 2021-03-18

## 2021-03-18 RX ORDER — DEXTROAMPHETAMINE SACCHARATE, AMPHETAMINE ASPARTATE MONOHYDRATE, DEXTROAMPHETAMINE SULFATE AND AMPHETAMINE SULFATE 5; 5; 5; 5 MG/1; MG/1; MG/1; MG/1
20 CAPSULE, EXTENDED RELEASE ORAL EVERY MORNING
Qty: 30 CAPSULE | Refills: 0 | Status: SHIPPED | OUTPATIENT
Start: 2021-03-18 | End: 2021-04-27 | Stop reason: SDUPTHER

## 2021-03-18 RX ORDER — ALBUTEROL SULFATE 90 UG/1
2 AEROSOL, METERED RESPIRATORY (INHALATION) EVERY 6 HOURS PRN
Qty: 1 INHALER | Refills: 0 | Status: SHIPPED | OUTPATIENT
Start: 2021-03-18

## 2021-03-18 NOTE — LETTER
March 18, 2021     Patient: West Stanley   YOB: 1980   Date of Visit: 3/18/2021       To Whom it May Concern:    West Stanley is under my professional care  He was seen in my office on 3/18/2021  Please excuse from work 3/17 and 3/18  May return to work 3/19 without restriction  If you have any questions or concerns, please don't hesitate to call           Sincerely,          NICHOLAS Gordon        CC: No Recipients

## 2021-03-18 NOTE — PROGRESS NOTES
[facility]    ASSESSMENT AND PLAN     1  Diarrhea, unspecified type     Symptoms have resolved  Appears to have been food induced  Assessment unremarkable  No other concerns  Work note given    2  Attention deficit hyperactivity disorder (ADHD), predominantly inattentive type   briefly discussed patient's ADHD today  Takes the 20 XR around 5:00 a m  Roc Lamar Does not feel that it "kicks in"  As quickly as it should  He feels sluggish with brain fog until around 9am     We discussed his lifestyle/habits  Does admit to drinking alcohol every night after work  Will often have his last alcoholic drink at 10 or 11 at night  Drinks several drinks per night  Drinks minimal water  Discussed his sluggish symptoms are likely related to same  Discussed importance of decreasing his alcohol intake, for health reasons as well as the possible adverse effects when mixed with medications  He will work on cutting back  Re-assess in 1 month  Return sooner if needed  - amphetamine-dextroamphetamine (ADDERALL) 10 mg tablet; Take 1 tablet (10 mg total) by mouth daily as needed (concentration in afternoon)Max Daily Amount: 10 mg  Dispense: 30 tablet; Refill: 0  - amphetamine-dextroamphetamine (ADDERALL XR) 20 MG 24 hr capsule; Take 1 capsule (20 mg total) by mouth every morningMax Daily Amount: 20 mg  Dispense: 30 capsule; Refill: 0    3  Essential hypertension  Over due for lab work  He is to complete prior to follow up    - Comprehensive metabolic panel; Future  - Comprehensive metabolic panel    4  Screening for diabetes mellitus    - Comprehensive metabolic panel; Future  - Comprehensive metabolic panel    5  Screening for thyroid disorder    - TSH, 3rd generation with Free T4 reflex; Future  - TSH, 3rd generation with Free T4 reflex    6  Screening for lipid disorders    - Lipid panel; Future  - Lipid panel    7  Screening for cardiovascular condition    - CBC and differential; Future  - CBC and differential    8  Moderate asthma without complication, unspecified whether persistent   patient with wheezes throughout  Admits to frequent, productive cough and periods of SOB  Audible wheezing  Options discussed  Will start daily inhaler  Albuterol for prn use  Instructed on proper use of inhaler  Encouraged compliance to monitor for effectiveness/symptom relief    - BREO  - Albuterol    9  Tobacco use  Continues smoking 1-2 ppd  encouraged cessation  Pt not ready yet    10  Alcohol use  Admits to several drinks per night  Encouraged cessation  Discussed above    SUBJECTIVE       Patient ID: Moni Cooper is a 38 y o  male  Chief Complaint   Patient presents with    Diarrhea     x2d    Abdominal Cramping     x2d    Follow-up     ADHD       HISTORY OF PRESENT ILLNESS     Patient presents today with GI complaint  Symptoms started on Tuesday night  Abdominal cramping and diarrhea  Believes it was food induced  Ate stuffed shells with cream sauce Tuesday evening  Symptoms started during the night Tuesday  He did go to work Wednesday, but ended up leaving half day  Called out today, as his symptoms persisted through the night  Took 2 doses of Pepto  Symptoms have since resolved  Needs work note        The following portions of the patient's history were reviewed and updated as appropriate: allergies, current medications, past family history, past medical history, past social history, past surgical history and problem list     REVIEW OF SYSTEMS  Review of Systems   Constitutional: Negative  Respiratory: Negative  Cardiovascular: Negative  Gastrointestinal: Negative  Diarrhea: resolved  Genitourinary: Negative  Neurological: Negative  Psychiatric/Behavioral: Positive for decreased concentration  Negative for self-injury, sleep disturbance and suicidal ideas  The patient is not nervous/anxious          OBJECTIVE      VITAL SIGNS  /84 (BP Location: Left arm, Patient Position: Sitting, Cuff Size: Standard)   Pulse 100   Temp 97 9 °F (36 6 °C) (Tympanic)   Resp 16   Ht 5' 9 69" (1 77 m)   Wt 72 9 kg (160 lb 12 8 oz)   SpO2 98%   BMI 23 28 kg/m²     CURRENT MEDICATIONS    Current Outpatient Medications:     ALPRAZolam (XANAX) 0 5 mg tablet, Take 1 tablet (0 5 mg total) by mouth daily at bedtime as needed for anxiety, Disp: 10 tablet, Rfl: 0    amLODIPine (NORVASC) 5 mg tablet, Take 1 tablet (5 mg total) by mouth daily, Disp: 90 tablet, Rfl: 0    amphetamine-dextroamphetamine (ADDERALL XR) 20 MG 24 hr capsule, Take 1 capsule (20 mg total) by mouth every morningMax Daily Amount: 20 mg, Disp: 30 capsule, Rfl: 0    amphetamine-dextroamphetamine (ADDERALL) 10 mg tablet, Take 1 tablet (10 mg total) by mouth daily as needed (concentration in afternoon)Max Daily Amount: 10 mg, Disp: 30 tablet, Rfl: 0    ibuprofen (MOTRIN) 800 mg tablet, Take 1 tablet (800 mg total) by mouth every 8 (eight) hours as needed for mild pain, Disp: 30 tablet, Rfl: 0    lisinopril-hydrochlorothiazide (PRINZIDE,ZESTORETIC) 20-25 MG per tablet, Take 1 tablet by mouth daily, Disp: 90 tablet, Rfl: 1    omeprazole (PriLOSEC) 40 MG capsule, Take 1 capsule (40 mg total) by mouth daily, Disp: 90 capsule, Rfl: 1    ofloxacin (OCUFLOX) 0 3 % ophthalmic solution, Administer 1 drop into the left eye 4 (four) times a day (Patient not taking: Reported on 3/18/2021), Disp: 2 5 mL, Rfl: 0      PHYSICAL EXAMINATION   Physical Exam  Vitals signs and nursing note reviewed  Constitutional:       General: He is not in acute distress  Appearance: Normal appearance  He is not ill-appearing  Cardiovascular:      Rate and Rhythm: Normal rate and regular rhythm  Heart sounds: Normal heart sounds  Pulmonary:      Effort: Pulmonary effort is normal  No respiratory distress  Breath sounds: Wheezing (throughout) present  No decreased breath sounds, rhonchi or rales  Musculoskeletal:      Right lower leg: No edema        Left lower leg: No edema  Skin:     General: Skin is warm and dry  Neurological:      Mental Status: He is alert and oriented to person, place, and time  Psychiatric:         Attention and Perception: Attention normal          Mood and Affect: Mood normal          Speech: Speech normal          Behavior: Behavior normal          Thought Content:  Thought content normal          Judgment: Judgment normal

## 2021-03-19 ENCOUNTER — TELEPHONE (OUTPATIENT)
Dept: FAMILY MEDICINE CLINIC | Facility: CLINIC | Age: 41
End: 2021-03-19

## 2021-03-22 ENCOUNTER — TELEPHONE (OUTPATIENT)
Dept: FAMILY MEDICINE CLINIC | Facility: CLINIC | Age: 41
End: 2021-03-22

## 2021-03-22 NOTE — TELEPHONE ENCOUNTER
Pt called again asking if we can get his note ready for his work  He was out of work 3/17, 3/18 and 3/19  Please let pt know when its ready [phone]      Thank you

## 2021-04-03 DIAGNOSIS — K21.9 GASTROESOPHAGEAL REFLUX DISEASE: ICD-10-CM

## 2021-04-05 RX ORDER — OMEPRAZOLE 40 MG/1
CAPSULE, DELAYED RELEASE ORAL
Qty: 90 CAPSULE | Refills: 0 | Status: SHIPPED | OUTPATIENT
Start: 2021-04-05 | End: 2021-08-31 | Stop reason: DRUGHIGH

## 2021-04-27 DIAGNOSIS — F90.0 ATTENTION DEFICIT HYPERACTIVITY DISORDER (ADHD), PREDOMINANTLY INATTENTIVE TYPE: ICD-10-CM

## 2021-04-27 RX ORDER — DEXTROAMPHETAMINE SACCHARATE, AMPHETAMINE ASPARTATE MONOHYDRATE, DEXTROAMPHETAMINE SULFATE AND AMPHETAMINE SULFATE 5; 5; 5; 5 MG/1; MG/1; MG/1; MG/1
20 CAPSULE, EXTENDED RELEASE ORAL EVERY MORNING
Qty: 30 CAPSULE | Refills: 0 | Status: SHIPPED | OUTPATIENT
Start: 2021-04-27 | End: 2021-05-27 | Stop reason: SDUPTHER

## 2021-04-27 RX ORDER — DEXTROAMPHETAMINE SACCHARATE, AMPHETAMINE ASPARTATE, DEXTROAMPHETAMINE SULFATE AND AMPHETAMINE SULFATE 2.5; 2.5; 2.5; 2.5 MG/1; MG/1; MG/1; MG/1
10 TABLET ORAL DAILY PRN
Qty: 30 TABLET | Refills: 0 | Status: SHIPPED | OUTPATIENT
Start: 2021-04-27 | End: 2021-05-27 | Stop reason: SDUPTHER

## 2021-04-27 NOTE — TELEPHONE ENCOUNTER
Last ov 3/18/21 via f/u  PDM checked Adderall 10 & 20 mg last filled 3/19/21 Q-30 via 30 day supply R-0

## 2021-04-27 NOTE — TELEPHONE ENCOUNTER
Pt called for Adderral refill  He is asking if he can use the generic because he is between jobs and waiting for his new insurance to work  The brand is to expensive  He has been out of the adderral for a few days       [facility] [employer]  [phone]

## 2021-05-25 ENCOUNTER — TELEPHONE (OUTPATIENT)
Dept: FAMILY MEDICINE CLINIC | Facility: CLINIC | Age: 41
End: 2021-05-25

## 2021-05-25 NOTE — TELEPHONE ENCOUNTER
Pt called requesting refill on both of his adderall prescriptions  Pt stated he no longer has insurance and would like the generic sent out to [address]  Please advise

## 2021-05-27 DIAGNOSIS — F90.0 ATTENTION DEFICIT HYPERACTIVITY DISORDER (ADHD), PREDOMINANTLY INATTENTIVE TYPE: ICD-10-CM

## 2021-05-28 RX ORDER — DEXTROAMPHETAMINE SACCHARATE, AMPHETAMINE ASPARTATE MONOHYDRATE, DEXTROAMPHETAMINE SULFATE AND AMPHETAMINE SULFATE 5; 5; 5; 5 MG/1; MG/1; MG/1; MG/1
20 CAPSULE, EXTENDED RELEASE ORAL EVERY MORNING
Qty: 30 CAPSULE | Refills: 0 | Status: SHIPPED | OUTPATIENT
Start: 2021-05-28 | End: 2021-06-21 | Stop reason: SDUPTHER

## 2021-05-28 RX ORDER — DEXTROAMPHETAMINE SACCHARATE, AMPHETAMINE ASPARTATE, DEXTROAMPHETAMINE SULFATE AND AMPHETAMINE SULFATE 2.5; 2.5; 2.5; 2.5 MG/1; MG/1; MG/1; MG/1
10 TABLET ORAL DAILY PRN
Qty: 30 TABLET | Refills: 0 | Status: SHIPPED | OUTPATIENT
Start: 2021-05-28 | End: 2021-06-21 | Stop reason: SDUPTHER

## 2021-07-27 DIAGNOSIS — F90.0 ATTENTION DEFICIT HYPERACTIVITY DISORDER (ADHD), PREDOMINANTLY INATTENTIVE TYPE: ICD-10-CM

## 2021-07-27 NOTE — TELEPHONE ENCOUNTER
Patient requesting a refill of:    Amphetamine dextroamphetamine      [facility] [address]        FYI~ his new insurance will start in August and he will call and make appt as soon as he gets his #'s

## 2021-07-29 RX ORDER — DEXTROAMPHETAMINE SACCHARATE, AMPHETAMINE ASPARTATE MONOHYDRATE, DEXTROAMPHETAMINE SULFATE AND AMPHETAMINE SULFATE 5; 5; 5; 5 MG/1; MG/1; MG/1; MG/1
20 CAPSULE, EXTENDED RELEASE ORAL EVERY MORNING
Qty: 14 CAPSULE | Refills: 0 | Status: SHIPPED | OUTPATIENT
Start: 2021-07-29 | End: 2021-08-31 | Stop reason: SDUPTHER

## 2021-07-29 RX ORDER — DEXTROAMPHETAMINE SACCHARATE, AMPHETAMINE ASPARTATE, DEXTROAMPHETAMINE SULFATE AND AMPHETAMINE SULFATE 2.5; 2.5; 2.5; 2.5 MG/1; MG/1; MG/1; MG/1
10 TABLET ORAL DAILY PRN
Qty: 14 TABLET | Refills: 0 | Status: SHIPPED | OUTPATIENT
Start: 2021-07-29 | End: 2021-08-31 | Stop reason: SDUPTHER

## 2021-07-29 NOTE — TELEPHONE ENCOUNTER
Pls call pt  I will refill today, but he is over due for follow up  No further refills will be allowed until he is seen in office   Pls schedule 2 slots

## 2021-07-30 NOTE — TELEPHONE ENCOUNTER
Pt is very upset that he is only getting 14 pills and that he doesn't have insurance so he doesn't want to make an appt  I explained to him that the appt would be $25 deposit and then the bill would be discounted and he would be billed for the rest  He said no one told him that and he was very upset that no one told him that

## 2021-08-31 ENCOUNTER — OFFICE VISIT (OUTPATIENT)
Dept: FAMILY MEDICINE CLINIC | Facility: CLINIC | Age: 41
End: 2021-08-31

## 2021-08-31 VITALS
WEIGHT: 159 LBS | HEART RATE: 106 BPM | TEMPERATURE: 98.3 F | DIASTOLIC BLOOD PRESSURE: 90 MMHG | SYSTOLIC BLOOD PRESSURE: 130 MMHG | BODY MASS INDEX: 24.1 KG/M2 | HEIGHT: 68 IN | OXYGEN SATURATION: 96 %

## 2021-08-31 DIAGNOSIS — K21.9 GASTROESOPHAGEAL REFLUX DISEASE: ICD-10-CM

## 2021-08-31 DIAGNOSIS — I10 ESSENTIAL HYPERTENSION: Primary | ICD-10-CM

## 2021-08-31 DIAGNOSIS — Z72.0 TOBACCO USE: ICD-10-CM

## 2021-08-31 DIAGNOSIS — F90.0 ATTENTION DEFICIT HYPERACTIVITY DISORDER (ADHD), PREDOMINANTLY INATTENTIVE TYPE: ICD-10-CM

## 2021-08-31 DIAGNOSIS — Z72.89 ALCOHOL USE: ICD-10-CM

## 2021-08-31 PROCEDURE — 99214 OFFICE O/P EST MOD 30 MIN: CPT | Performed by: NURSE PRACTITIONER

## 2021-08-31 RX ORDER — DEXTROAMPHETAMINE SACCHARATE, AMPHETAMINE ASPARTATE, DEXTROAMPHETAMINE SULFATE AND AMPHETAMINE SULFATE 2.5; 2.5; 2.5; 2.5 MG/1; MG/1; MG/1; MG/1
10 TABLET ORAL DAILY PRN
Qty: 30 TABLET | Refills: 0 | Status: SHIPPED | OUTPATIENT
Start: 2021-08-31 | End: 2021-10-06 | Stop reason: SDUPTHER

## 2021-08-31 RX ORDER — LISINOPRIL AND HYDROCHLOROTHIAZIDE 25; 20 MG/1; MG/1
1 TABLET ORAL DAILY
Qty: 90 TABLET | Refills: 1 | Status: SHIPPED | OUTPATIENT
Start: 2021-08-31 | End: 2022-04-12

## 2021-08-31 RX ORDER — OMEPRAZOLE 40 MG/1
40 CAPSULE, DELAYED RELEASE ORAL DAILY
Qty: 90 CAPSULE | Refills: 1 | Status: CANCELLED | OUTPATIENT
Start: 2021-08-31

## 2021-08-31 RX ORDER — DEXTROAMPHETAMINE SACCHARATE, AMPHETAMINE ASPARTATE MONOHYDRATE, DEXTROAMPHETAMINE SULFATE AND AMPHETAMINE SULFATE 5; 5; 5; 5 MG/1; MG/1; MG/1; MG/1
20 CAPSULE, EXTENDED RELEASE ORAL EVERY MORNING
Qty: 30 CAPSULE | Refills: 0 | Status: SHIPPED | OUTPATIENT
Start: 2021-08-31 | End: 2021-10-06 | Stop reason: SDUPTHER

## 2021-08-31 RX ORDER — OMEPRAZOLE 20 MG/1
20 CAPSULE, DELAYED RELEASE ORAL DAILY
Qty: 90 CAPSULE | Refills: 1 | Status: SHIPPED | OUTPATIENT
Start: 2021-08-31 | End: 2022-04-12

## 2021-08-31 RX ORDER — AMLODIPINE BESYLATE 5 MG/1
5 TABLET ORAL DAILY
Qty: 90 TABLET | Refills: 1 | Status: SHIPPED | OUTPATIENT
Start: 2021-08-31 | End: 2022-07-11

## 2021-09-01 NOTE — PROGRESS NOTES
[facility]    ASSESSMENT AND PLAN     1  Essential hypertension   stable  Asymptomatic   130/90 today, but patient does admit to not having his amlodipine the last several days  Is usually compliant with amlodipine 5 and lisinopril-hydrochlorothiazide 20-25  No change in treatment at this time  Refills provided today  Note patient is very overdue for routine lab work  He recently changed jobs and has insurance again, and states he will get them within the next month or two  Strongly encouraged compliance  Follow-up in office 6 months  Sooner with problems and/or concerns    - amLODIPine (NORVASC) 5 mg tablet; Take 1 tablet (5 mg total) by mouth daily  Dispense: 90 tablet; Refill: 1  - lisinopril-hydrochlorothiazide (PRINZIDE,ZESTORETIC) 20-25 MG per tablet; Take 1 tablet by mouth daily  Dispense: 90 tablet; Refill: 1    2  Gastroesophageal reflux disease   patient has been taking OTC 20 mg with good relief  He has made significant lifestyle change:  Stopped drinking 3-4 weeks ago, eating healthier, decreased stress (switched jobs)  Will continue at omeprazole 20 daily  Goal to completely eliminate if symptoms a lot  Reassess 6 months    - omeprazole (PriLOSEC) 20 mg delayed release capsule; Take 1 capsule (20 mg total) by mouth daily  Dispense: 90 capsule; Refill: 1    3  Attention deficit hyperactivity disorder (ADHD), predominantly inattentive type    Stable  Continues with 20 XL a m  and 10 early afternoon  Recently got his medical marijuana card  Is going to be going to a dispensary to talk about appropriate dosing for his ADHD  His goal is to get off of Adderall completely  He will monitor symptoms as he makes the transition  Follow-up in office 6 months  Sooner if needed    - amphetamine-dextroamphetamine (ADDERALL XR) 20 MG 24 hr capsule; Take 1 capsule (20 mg total) by mouth every morningMax Daily Amount: 20 mg  Dispense: 30 capsule;  Refill: 0  - amphetamine-dextroamphetamine (ADDERALL) 10 mg tablet; Take 1 tablet (10 mg total) by mouth daily as needed (concentration in afternoon)Max Daily Amount: 10 mg  Dispense: 30 tablet; Refill: 0    4  Tobacco use   continues smoking 1-2 PPD  Cessation encouraged  He recently quit alcohol, and would like to change from Adderall to medical marijuana for his ADHD  He is not ready to quit at this time  Will continue to reassess readiness at each visit    5  Alcohol use   patient states he quit alcohol completely 3-4 weeks ago  States he physically feels better  Encouraged continued success  SUBJECTIVE       Patient ID: Yakov Lazar is a 42 y o  male  Chief Complaint   Patient presents with    Follow-up     Med check       HISTORY OF PRESENT ILLNESS     Presents tonight for med check for hypertension, GERD and ADHD  The following portions of the patient's history were reviewed and updated as appropriate: allergies, current medications, past family history, past medical history, past social history, past surgical history and problem list     REVIEW OF SYSTEMS  Review of Systems   Constitutional: Negative  HENT: Negative  Respiratory: Negative  Cardiovascular: Negative  Gastrointestinal: Negative  Genitourinary: Negative  Neurological: Negative  Psychiatric/Behavioral: Positive for decreased concentration (Occasional, when he does not have his medication)  Negative for dysphoric mood, self-injury, sleep disturbance ( sleeping better) and suicidal ideas  The patient is not nervous/anxious          OBJECTIVE      VITAL SIGNS  /90 (BP Location: Left arm, Patient Position: Sitting, Cuff Size: Adult)   Pulse (!) 106   Temp 98 3 °F (36 8 °C)   Ht 5' 8" (1 727 m)   Wt 72 1 kg (159 lb)   SpO2 96%   BMI 24 18 kg/m²     CURRENT MEDICATIONS    Current Outpatient Medications:     ALPRAZolam (XANAX) 0 5 mg tablet, Take 1 tablet (0 5 mg total) by mouth daily at bedtime as needed for anxiety, Disp: 10 tablet, Rfl: 0    amLODIPine (NORVASC) 5 mg tablet, Take 1 tablet (5 mg total) by mouth daily, Disp: 90 tablet, Rfl: 1    amphetamine-dextroamphetamine (ADDERALL XR) 20 MG 24 hr capsule, Take 1 capsule (20 mg total) by mouth every morningMax Daily Amount: 20 mg, Disp: 30 capsule, Rfl: 0    amphetamine-dextroamphetamine (ADDERALL) 10 mg tablet, Take 1 tablet (10 mg total) by mouth daily as needed (concentration in afternoon)Max Daily Amount: 10 mg, Disp: 30 tablet, Rfl: 0    lisinopril-hydrochlorothiazide (PRINZIDE,ZESTORETIC) 20-25 MG per tablet, Take 1 tablet by mouth daily, Disp: 90 tablet, Rfl: 1    albuterol (PROVENTIL HFA,VENTOLIN HFA) 90 mcg/act inhaler, Inhale 2 puffs every 6 (six) hours as needed for wheezing or shortness of breath (Patient not taking: Reported on 8/31/2021), Disp: 1 Inhaler, Rfl: 0    fluticasone-vilanterol (BREO ELLIPTA) 100-25 mcg/inh inhaler, Inhale 1 puff daily Rinse mouth after use  (Patient not taking: Reported on 8/31/2021), Disp: 1 Inhaler, Rfl: 0    ibuprofen (MOTRIN) 800 mg tablet, Take 1 tablet (800 mg total) by mouth every 8 (eight) hours as needed for mild pain (Patient not taking: Reported on 8/31/2021), Disp: 30 tablet, Rfl: 0    ofloxacin (OCUFLOX) 0 3 % ophthalmic solution, Administer 1 drop into the left eye 4 (four) times a day (Patient not taking: Reported on 3/18/2021), Disp: 2 5 mL, Rfl: 0    omeprazole (PriLOSEC) 20 mg delayed release capsule, Take 1 capsule (20 mg total) by mouth daily, Disp: 90 capsule, Rfl: 1      PHYSICAL EXAMINATION   Physical Exam  Vitals and nursing note reviewed  Constitutional:       General: He is not in acute distress  Appearance: Normal appearance  He is well-developed and well-groomed  He is not ill-appearing  HENT:      Head: Normocephalic and atraumatic  Eyes:      Conjunctiva/sclera: Conjunctivae normal       Pupils: Pupils are equal, round, and reactive to light  Neck:      Vascular: No carotid bruit     Cardiovascular: Rate and Rhythm: Normal rate and regular rhythm  Heart sounds: Normal heart sounds  Pulmonary:      Effort: Pulmonary effort is normal       Breath sounds: Normal air entry  Wheezing ( throughout) present  No decreased breath sounds, rhonchi or rales  Musculoskeletal:      Right lower leg: No edema  Left lower leg: No edema  Skin:     General: Skin is warm and dry  Neurological:      General: No focal deficit present  Mental Status: He is alert and oriented to person, place, and time     Psychiatric:         Attention and Perception: Attention normal          Mood and Affect: Mood and affect normal          Speech: Speech normal          Behavior: Behavior normal

## 2021-10-04 ENCOUNTER — TELEPHONE (OUTPATIENT)
Dept: FAMILY MEDICINE CLINIC | Facility: CLINIC | Age: 41
End: 2021-10-04

## 2021-10-04 DIAGNOSIS — F90.0 ATTENTION DEFICIT HYPERACTIVITY DISORDER (ADHD), PREDOMINANTLY INATTENTIVE TYPE: ICD-10-CM

## 2021-10-04 RX ORDER — DEXTROAMPHETAMINE SACCHARATE, AMPHETAMINE ASPARTATE, DEXTROAMPHETAMINE SULFATE AND AMPHETAMINE SULFATE 2.5; 2.5; 2.5; 2.5 MG/1; MG/1; MG/1; MG/1
10 TABLET ORAL DAILY PRN
Qty: 30 TABLET | Refills: 0 | Status: CANCELLED | OUTPATIENT
Start: 2021-10-04

## 2021-10-04 RX ORDER — DEXTROAMPHETAMINE SACCHARATE, AMPHETAMINE ASPARTATE MONOHYDRATE, DEXTROAMPHETAMINE SULFATE AND AMPHETAMINE SULFATE 5; 5; 5; 5 MG/1; MG/1; MG/1; MG/1
20 CAPSULE, EXTENDED RELEASE ORAL EVERY MORNING
Qty: 30 CAPSULE | Refills: 0 | Status: CANCELLED | OUTPATIENT
Start: 2021-10-04

## 2021-12-04 NOTE — RESULT NOTES
Verified Results  * XR FOOT 3+ VIEW RIGHT 03Oct2016 01:26PM Ibrahima Elizalde    Order Number: [id number]     Test Name Result Flag Reference   XR FOOT 3+ VW RIGHT (Report)     RIGHT FOOT     INDICATION: EVAL RT FOOT PAIN ALONG 3RD, 4TH METATARSAL AND ALONG ANKLE FROM INJ 9-24-16     COMPARISON: None     VIEWS: 3; 3 images     FINDINGS:     There is no acute fracture or dislocation  No degenerative changes  No lytic or blastic lesions are seen  Soft tissues are unremarkable  IMPRESSION:     No acute osseous abnormality         Workstation performed: [id number]     Signed by:   Carolyne Stuart MD   10/4/16 9.37

## 2022-01-04 PROCEDURE — 87636 SARSCOV2 & INF A&B AMP PRB: CPT | Performed by: NURSE PRACTITIONER

## 2022-01-08 ENCOUNTER — TELEPHONE (OUTPATIENT)
Dept: FAMILY MEDICINE CLINIC | Facility: CLINIC | Age: 42
End: 2022-01-08

## 2022-01-14 DIAGNOSIS — F90.0 ATTENTION DEFICIT HYPERACTIVITY DISORDER (ADHD), PREDOMINANTLY INATTENTIVE TYPE: ICD-10-CM

## 2022-01-17 RX ORDER — DEXTROAMPHETAMINE SACCHARATE, AMPHETAMINE ASPARTATE, DEXTROAMPHETAMINE SULFATE AND AMPHETAMINE SULFATE 2.5; 2.5; 2.5; 2.5 MG/1; MG/1; MG/1; MG/1
10 TABLET ORAL DAILY PRN
Qty: 30 TABLET | Refills: 0 | Status: SHIPPED | OUTPATIENT
Start: 2022-01-17 | End: 2022-02-17 | Stop reason: SDUPTHER

## 2022-01-17 RX ORDER — DEXTROAMPHETAMINE SACCHARATE, AMPHETAMINE ASPARTATE MONOHYDRATE, DEXTROAMPHETAMINE SULFATE AND AMPHETAMINE SULFATE 5; 5; 5; 5 MG/1; MG/1; MG/1; MG/1
20 CAPSULE, EXTENDED RELEASE ORAL EVERY MORNING
Qty: 30 CAPSULE | Refills: 0 | Status: SHIPPED | OUTPATIENT
Start: 2022-01-17 | End: 2022-01-19 | Stop reason: SDUPTHER

## 2022-01-17 NOTE — TELEPHONE ENCOUNTER
Last 10 mg fill: 12/15/21 #30  Last 20 mg fill: 12/16/21 #30  Last appt: 8/31/21  No future appt scheduled

## 2022-01-19 ENCOUNTER — TELEPHONE (OUTPATIENT)
Dept: FAMILY MEDICINE CLINIC | Facility: CLINIC | Age: 42
End: 2022-01-19

## 2022-01-25 DIAGNOSIS — F90.0 ATTENTION DEFICIT HYPERACTIVITY DISORDER (ADHD), PREDOMINANTLY INATTENTIVE TYPE: ICD-10-CM

## 2022-01-25 RX ORDER — DEXTROAMPHETAMINE SACCHARATE, AMPHETAMINE ASPARTATE MONOHYDRATE, DEXTROAMPHETAMINE SULFATE AND AMPHETAMINE SULFATE 5; 5; 5; 5 MG/1; MG/1; MG/1; MG/1
20 CAPSULE, EXTENDED RELEASE ORAL EVERY MORNING
Qty: 30 CAPSULE | Refills: 0 | OUTPATIENT
Start: 2022-01-25

## 2022-04-10 DIAGNOSIS — I10 ESSENTIAL HYPERTENSION: ICD-10-CM

## 2022-04-10 DIAGNOSIS — K21.9 GASTROESOPHAGEAL REFLUX DISEASE: ICD-10-CM

## 2022-04-12 RX ORDER — OMEPRAZOLE 20 MG/1
CAPSULE, DELAYED RELEASE ORAL
Qty: 90 CAPSULE | Refills: 1 | Status: SHIPPED | OUTPATIENT
Start: 2022-04-12

## 2022-04-12 RX ORDER — LISINOPRIL AND HYDROCHLOROTHIAZIDE 25; 20 MG/1; MG/1
TABLET ORAL
Qty: 90 TABLET | Refills: 1 | Status: SHIPPED | OUTPATIENT
Start: 2022-04-12

## 2022-04-25 ENCOUNTER — TELEPHONE (OUTPATIENT)
Dept: FAMILY MEDICINE CLINIC | Facility: CLINIC | Age: 42
End: 2022-04-25

## 2022-04-25 NOTE — TELEPHONE ENCOUNTER
Pt called to schedule checkup, expressed frustration over scheduling process  He said, "You wait to schedule appointments until I'm due for them, then I have to make it work around my job, and you won't give me my medication refills until I come in, which only makes me wait longer " I apologized that this has been his experience with appointment scheduling   Pt hung up

## 2022-04-27 ENCOUNTER — OFFICE VISIT (OUTPATIENT)
Dept: FAMILY MEDICINE CLINIC | Facility: CLINIC | Age: 42
End: 2022-04-27
Payer: COMMERCIAL

## 2022-04-27 ENCOUNTER — TELEPHONE (OUTPATIENT)
Dept: FAMILY MEDICINE CLINIC | Facility: CLINIC | Age: 42
End: 2022-04-27

## 2022-04-27 ENCOUNTER — PATIENT MESSAGE (OUTPATIENT)
Dept: FAMILY MEDICINE CLINIC | Facility: CLINIC | Age: 42
End: 2022-04-27

## 2022-04-27 VITALS
DIASTOLIC BLOOD PRESSURE: 88 MMHG | TEMPERATURE: 98 F | BODY MASS INDEX: 23.52 KG/M2 | HEIGHT: 69 IN | WEIGHT: 158.8 LBS | HEART RATE: 89 BPM | SYSTOLIC BLOOD PRESSURE: 116 MMHG | OXYGEN SATURATION: 99 %

## 2022-04-27 DIAGNOSIS — J45.909 MODERATE ASTHMA WITHOUT COMPLICATION, UNSPECIFIED WHETHER PERSISTENT: ICD-10-CM

## 2022-04-27 DIAGNOSIS — Z13.1 SCREENING FOR DIABETES MELLITUS: ICD-10-CM

## 2022-04-27 DIAGNOSIS — K21.9 GASTROESOPHAGEAL REFLUX DISEASE, UNSPECIFIED WHETHER ESOPHAGITIS PRESENT: ICD-10-CM

## 2022-04-27 DIAGNOSIS — F90.0 ATTENTION DEFICIT HYPERACTIVITY DISORDER (ADHD), PREDOMINANTLY INATTENTIVE TYPE: ICD-10-CM

## 2022-04-27 DIAGNOSIS — Z23 NEED FOR TDAP VACCINATION: ICD-10-CM

## 2022-04-27 DIAGNOSIS — Z79.899 HIGH RISK MEDICATION USE: ICD-10-CM

## 2022-04-27 DIAGNOSIS — Z13.220 SCREENING FOR LIPID DISORDERS: ICD-10-CM

## 2022-04-27 DIAGNOSIS — Z79.899 OTHER LONG TERM (CURRENT) DRUG THERAPY: ICD-10-CM

## 2022-04-27 DIAGNOSIS — F90.0 ATTENTION DEFICIT HYPERACTIVITY DISORDER (ADHD), PREDOMINANTLY INATTENTIVE TYPE: Primary | ICD-10-CM

## 2022-04-27 DIAGNOSIS — Z72.0 TOBACCO USE: ICD-10-CM

## 2022-04-27 DIAGNOSIS — Z13.6 SCREENING FOR CARDIOVASCULAR CONDITION: ICD-10-CM

## 2022-04-27 DIAGNOSIS — Z13.29 SCREENING FOR THYROID DISORDER: ICD-10-CM

## 2022-04-27 DIAGNOSIS — I10 ESSENTIAL HYPERTENSION: ICD-10-CM

## 2022-04-27 DIAGNOSIS — Z72.89 ALCOHOL USE: ICD-10-CM

## 2022-04-27 PROCEDURE — 90715 TDAP VACCINE 7 YRS/> IM: CPT | Performed by: NURSE PRACTITIONER

## 2022-04-27 PROCEDURE — 99214 OFFICE O/P EST MOD 30 MIN: CPT | Performed by: NURSE PRACTITIONER

## 2022-04-27 PROCEDURE — 90471 IMMUNIZATION ADMIN: CPT | Performed by: NURSE PRACTITIONER

## 2022-04-27 RX ORDER — DEXTROAMPHETAMINE SACCHARATE, AMPHETAMINE ASPARTATE MONOHYDRATE, DEXTROAMPHETAMINE SULFATE AND AMPHETAMINE SULFATE 5; 5; 5; 5 MG/1; MG/1; MG/1; MG/1
20 CAPSULE, EXTENDED RELEASE ORAL EVERY MORNING
Qty: 30 CAPSULE | Refills: 0 | Status: SHIPPED | OUTPATIENT
Start: 2022-04-27 | End: 2022-04-28 | Stop reason: SDUPTHER

## 2022-04-27 RX ORDER — DEXTROAMPHETAMINE SACCHARATE, AMPHETAMINE ASPARTATE, DEXTROAMPHETAMINE SULFATE AND AMPHETAMINE SULFATE 3.75; 3.75; 3.75; 3.75 MG/1; MG/1; MG/1; MG/1
15 TABLET ORAL DAILY
Qty: 30 TABLET | Refills: 0 | Status: SHIPPED | OUTPATIENT
Start: 2022-04-27 | End: 2022-04-28 | Stop reason: SDUPTHER

## 2022-04-27 RX ORDER — FLUTICASONE FUROATE AND VILANTEROL 100; 25 UG/1; UG/1
1 POWDER RESPIRATORY (INHALATION) DAILY
Qty: 60 EACH | Refills: 1 | Status: SHIPPED | OUTPATIENT
Start: 2022-04-27 | End: 2022-04-28 | Stop reason: SDUPTHER

## 2022-04-27 NOTE — TELEPHONE ENCOUNTER
Called pt, he had appt today 4/27 but we need updated insurance and employer info  Pt was still at work, said his information is at home and when he gets home he will call with everything

## 2022-04-27 NOTE — TELEPHONE ENCOUNTER
Pt said todays prescription fluticasone-vilanterol (BREO ELLIPTA) 100-25 mcg/inh inhaler [[id number]] was very expensive for him he would like to know if there is a generic brand Radha can prescription

## 2022-04-27 NOTE — PROGRESS NOTES
Assessment/Plan     Problem List Items Addressed This Visit     None      Visit Diagnoses     High risk medication use    -  Primary    Relevant Orders    [facility] All Prescribed Meds    Amphetamines, Methamphetamines    Butalbital    Phenobarbital    Secobarbital    Temazepam    Alprazolam    Clonazepam    Diazepam    Lorazepam    Oxazepam    Gabapentin    Pregabalin    Cocaine    Heroin    Buprenorphine    Levorphanol    Meperidine    Naltrexone    Fentanyl    Methadone    Oxycodone    Oxymorphone    Tapentadol    Tramadol    Codeine, Hydrocodone, Hydropmorphone, Morphine    Bath Salts    Kratom    Spice    Methylphenidate    Phentermine    Validity Oxidant    Validity Creatinine    Validity pH    Validity Specific    Other long term (current) drug therapy        Relevant Orders    [facility]    Amphetamines, Methamphetamines    Butalbital    Phenobarbital    Secobarbital    Temazepam    Alprazolam    Clonazepam    Diazepam    Lorazepam    Oxazepam    Gabapentin    Pregabalin    Cocaine    Heroin    Buprenorphine    Levorphanol    Meperidine    Naltrexone    Fentanyl    Methadone    Oxycodone    Oxymorphone    Tapentadol    Tramadol    Codeine, Hydrocodone, Hydropmorphone, Morphine    Bath Salts    Kratom    Spice    Methylphenidate    Phentermine    Validity Oxidant    Validity Creatinine    Validity pH    Validity Specific         Opioid Management Plan      Subjective     Opioid Management HPI  HPI  Pain Medications             ibuprofen (MOTRIN) 800 mg tablet Take 1 tablet (800 mg total) by mouth every 8 (eight) hours as needed for mild pain           PDMP Review       Value Time User    PDMP Reviewed  Yes 4/14/2022  2:35 PM NICHOLAS Brewer         Review of Systems  Objective     /88 (BP Location: Left arm, Patient Position: Sitting, Cuff Size: Adult)   Pulse 89   Temp 98 °F (36 7 °C)   Ht 5' 9" (1 753 m)   Wt 72 kg (158 lb 12 8 oz)   SpO2 99%   BMI 23 45 kg/m² Physical Exam    NICHOLAS Pelletier

## 2022-04-28 RX ORDER — DEXTROAMPHETAMINE SACCHARATE, AMPHETAMINE ASPARTATE, DEXTROAMPHETAMINE SULFATE AND AMPHETAMINE SULFATE 3.75; 3.75; 3.75; 3.75 MG/1; MG/1; MG/1; MG/1
15 TABLET ORAL DAILY
Qty: 30 TABLET | Refills: 0 | Status: SHIPPED | OUTPATIENT
Start: 2022-04-28 | End: 2022-06-07 | Stop reason: SDUPTHER

## 2022-04-28 RX ORDER — DEXTROAMPHETAMINE SACCHARATE, AMPHETAMINE ASPARTATE MONOHYDRATE, DEXTROAMPHETAMINE SULFATE AND AMPHETAMINE SULFATE 5; 5; 5; 5 MG/1; MG/1; MG/1; MG/1
20 CAPSULE, EXTENDED RELEASE ORAL EVERY MORNING
Qty: 30 CAPSULE | Refills: 0 | Status: SHIPPED | OUTPATIENT
Start: 2022-04-28 | End: 2022-06-07 | Stop reason: ALTCHOICE

## 2022-04-28 RX ORDER — FLUTICASONE FUROATE AND VILANTEROL 100; 25 UG/1; UG/1
1 POWDER RESPIRATORY (INHALATION) DAILY
Qty: 60 EACH | Refills: 1 | Status: SHIPPED | OUTPATIENT
Start: 2022-04-28

## 2022-04-28 NOTE — TELEPHONE ENCOUNTER
Rx sent  Pls call and [facility] other [facility]  Pls also tell pt that he will need to go to [facility] for his urine    That will need to be completed before any further meds can be prescribed

## 2022-04-28 NOTE — TELEPHONE ENCOUNTER
Called and spoke to a [facility] Rep who stated that as far as they concerned the temperature of urine is more so for us to record and the urine container doesn't have to be [facility]

## 2022-04-28 NOTE — TELEPHONE ENCOUNTER
Pls call pt  He will need to contact his insurance and find out what they cover  Additionally, pls find out pt insurance if he has not called in with it yet  He does need to complete the urine testing before any further controlled substances can be prescribed

## 2022-04-28 NOTE — TELEPHONE ENCOUNTER
Pt was made aware yesterday, that depending on his insurance (which he did not know his specific plan yesterday), that he may need to go directly to [facility] for his urine  He and I had a conversation about this yesterday  We do not have the required sample containers that are needed for [facility]  He should complete this testing in the next few days  He is also very over due for labs - which he knows he must complete as well  This is important, to ensure appropriate care and treatments are being delivered

## 2022-04-28 NOTE — TELEPHONE ENCOUNTER
[facility] [address]     Left detailed VM and (my chart) regarding future refills and urine test

## 2022-05-01 LAB
PATH REPORT.SITE OF ORIGIN SPEC: NORMAL
QUESTION/PROBLEM: NORMAL

## 2022-05-02 NOTE — PATIENT INSTRUCTIONS

## 2022-05-02 NOTE — PROGRESS NOTES
Assessment/Plan     Problem List Items Addressed This Visit        Other    Attention deficit hyperactivity disorder (ADHD), predominantly inattentive type - Primary    Relevant Orders    Urine Drug Screen    Methylphenidate    Fentanyl with Confirmation    Buprenorphine w/ Naloxone    Naltrexone    Gabapentin    Pregabalin    Synthetic Stimulants    [facility] All Prescribed Medications      Other Visit Diagnoses     High risk medication use        Relevant Orders    [facility] Prescribed Meds    Amphetamines, Methamphetamines    Butalbital    Phenobarbital    Secobarbital    Temazepam    Alprazolam    Clonazepam    Diazepam    Lorazepam    Oxazepam    Gabapentin    Pregabalin    Cocaine    Heroin    Buprenorphine    Levorphanol    Meperidine    Naltrexone    Fentanyl    Methadone    Oxycodone    Oxymorphone    Tapentadol    Tramadol    Codeine, Hydrocodone, Hydropmorphone, Morphine    Bath Salts    Kratom    Spice    Methylphenidate    Phentermine    Validity Oxidant    Validity Creatinine    Validity pH    Validity Specific    Urine Drug Screen    Methylphenidate    Fentanyl with Confirmation    Buprenorphine w/ Naloxone    Naltrexone    Gabapentin    Pregabalin    Synthetic Stimulants    [facility] All Prescribed Medications    Other long term (current) drug therapy        Relevant Orders    [facility] Prescribed Meds    Amphetamines, Methamphetamines    Butalbital    Phenobarbital    Secobarbital    Temazepam    Alprazolam    Clonazepam    Diazepam    Lorazepam    Oxazepam    Gabapentin    Pregabalin    Cocaine    Heroin    Buprenorphine    Levorphanol    Meperidine    Naltrexone    Fentanyl    Methadone    Oxycodone    Oxymorphone    Tapentadol    Tramadol    Codeine, Hydrocodone, Hydropmorphone, Morphine    Bath Salts    Kratom    Spice    Methylphenidate    Phentermine    Validity Oxidant    Validity Creatinine    Validity pH    Validity Specific    Urine Drug Screen    Methylphenidate    Fentanyl with Confirmation    Buprenorphine w/ Naloxone    Naltrexone    Gabapentin    Pregabalin    Synthetic Stimulants    [facility] All Prescribed Medications    Tobacco use        Screening for diabetes mellitus        Relevant Orders    Comprehensive metabolic panel    Screening for thyroid disorder        Relevant Orders    TSH, 3rd generation with Free T4 reflex    Screening for lipid disorders        Relevant Orders    Lipid panel    Screening for cardiovascular condition        Relevant Orders    CBC and differential    Moderate asthma without complication, unspecified whether persistent        Need for Tdap vaccination        Relevant Orders    TDAP VACCINE GREATER THAN OR EQUAL TO 8YO IM (Completed)         Opioid Management Plan      Subjective     Opioid Management HPI  HPI       PDMP Review       Value Time User    PDMP Reviewed  Yes 4/27/2022  1:59 PM NICHOLAS Brewer         Review of Systems  Objective     /88 (BP Location: Left arm, Patient Position: Sitting, Cuff Size: Adult)   Pulse 89   Temp 98 °F (36 7 °C)   Ht 5' 9" (1 753 m)   Wt 72 kg (158 lb 12 8 oz)   SpO2 99%   BMI 23 45 kg/m²     Physical Exam    NICHOLAS Art

## 2022-05-04 NOTE — PROGRESS NOTES
[facility]    ASSESSMENT AND PLAN     1  Attention deficit hyperactivity disorder (ADHD), predominantly inattentive type  Assessment & Plan:  Appears stable  Compliant with Adderall 20 XL am and 10 afternoon prn  Does admit to taking afternoon dose most days  Feels well controlled  Discussed new policy and contract  Reviewed and signed today  Urine provided for drug screen  Rx refilled today  F/U 6 month  Sooner with any concerns    Note over due for routine lab work  Fasting orders placed  Pt instructed to complete within next few weeks    Orders:  -     Urine Drug Screen  -     Methylphenidate  -     Fentanyl with Confirmation  -     Buprenorphine w/ Naloxone  -     Naltrexone  -     Gabapentin  -     Pregabalin  -     Synthetic Stimulants  -     [facility] Prescribed Medications    2  High risk medication use  -     Urine Drug Screen  -     Methylphenidate  -     Fentanyl with Confirmation  -     Buprenorphine w/ Naloxone  -     Naltrexone  -     Gabapentin  -     Pregabalin  -     Synthetic Stimulants  -     [facility] All Prescribed Medications    3  Other long term (current) drug therapy  -     Urine Drug Screen  -     Methylphenidate  -     Fentanyl with Confirmation  -     Buprenorphine w/ Naloxone  -     Naltrexone  -     Gabapentin  -     Pregabalin  -     Synthetic Stimulants  -     [facility] All Prescribed Medications    4  Tobacco use  Assessment & Plan:  Continues smoking - averages 1 5 PPD  Although cessation encouraged, pt admits to not being ready to quit  Will continue to assess readiness at each subsequent visit      5  Screening for diabetes mellitus  -     Comprehensive metabolic panel; Future    6  Screening for thyroid disorder  -     TSH, 3rd generation with Free T4 reflex; Future    7  Screening for lipid disorders  -     Lipid panel; Future    8  Screening for cardiovascular condition  -     CBC and differential; Future    9   Moderate asthma without complication, unspecified whether persistent  Assessment & Plan:  Inconsistent with Breo  Lungs with wheezes throughout  Encouraged daily use as directed  Smoking cessation encoruaged      10  Need for Tdap vaccination  -     TDAP VACCINE GREATER THAN OR EQUAL TO 8YO IM    11  Essential hypertension  Assessment & Plan:  Stable  Asymptomatic  116/88 today  States compliant with Amlodipine 5 and lisinopril/HCTZ daily      12  Alcohol use  Comments:  Admits to frequent use  Discussed risks with mixing with medications  Need lab work -- assess LFT and kidney function    13  Gastroesophageal reflux disease, unspecified whether esophagitis present  Assessment & Plan:  Appears stable on Omeprazole 20 daily  SUBJECTIVE       Patient ID: Chance Quarles is a 42 y o  male  Chief Complaint   Patient presents with    Follow-up     ADHD med check       HISTORY OF PRESENT ILLNESS    Presents for routien check up  No new concerns        The following portions of the patient's history were reviewed and updated as appropriate: allergies, current medications, past family history, past medical history, past social history, past surgical history, and problem list     REVIEW OF SYSTEMS  Review of Systems   Constitutional: Negative  Respiratory: Negative  Cardiovascular: Negative  Gastrointestinal: Negative  Genitourinary: Negative  Neurological: Negative  Psychiatric/Behavioral: Negative  Negative for dysphoric mood, self-injury, sleep disturbance and suicidal ideas  Decreased concentration: stable on ADderall  The patient is not nervous/anxious          OBJECTIVE      VITAL SIGNS  /88 (BP Location: Left arm, Patient Position: Sitting, Cuff Size: Adult)   Pulse 89   Temp 98 °F (36 7 °C)   Ht 5' 9" (1 753 m)   Wt 72 kg (158 lb 12 8 oz)   SpO2 99%   BMI 23 45 kg/m²     CURRENT MEDICATIONS    Current Outpatient Medications:     amLODIPine (NORVASC) 5 mg tablet, Take 1 tablet (5 mg total) by mouth daily, Disp: 90 tablet, Rfl: 1    lisinopril-hydrochlorothiazide (PRINZIDE,ZESTORETIC) 20-25 MG per tablet, TAKE 1 TABLET BY MOUTH EVERY DAY, Disp: 90 tablet, Rfl: 1    omeprazole (PriLOSEC) 20 mg delayed release capsule, TAKE 1 CAPSULE BY MOUTH EVERY DAY, Disp: 90 capsule, Rfl: 1    albuterol (PROVENTIL HFA,VENTOLIN HFA) 90 mcg/act inhaler, Inhale 2 puffs every 6 (six) hours as needed for wheezing or shortness of breath (Patient not taking: Reported on 8/31/2021), Disp: 1 Inhaler, Rfl: 0    amphetamine-dextroamphetamine (ADDERALL XR) 20 MG 24 hr capsule, Take 1 capsule (20 mg total) by mouth every morning Max Daily Amount: 20 mg, Disp: 30 capsule, Rfl: 0    amphetamine-dextroamphetamine (ADDERALL, 15MG,) 15 MG tablet, Take 1 tablet (15 mg total) by mouth daily Max Daily Amount: 15 mg, Disp: 30 tablet, Rfl: 0    fluticasone-vilanterol (BREO ELLIPTA) 100-25 mcg/inh inhaler, Inhale 1 puff daily Rinse mouth after use , Disp: 60 each, Rfl: 1      PHYSICAL EXAMINATION   Physical Exam  Vitals and nursing note reviewed  Constitutional:       General: He is not in acute distress  Appearance: Normal appearance  He is well-developed and well-groomed  He is not ill-appearing  HENT:      Head: Normocephalic and atraumatic  Right Ear: Tympanic membrane, ear canal and external ear normal       Left Ear: Tympanic membrane, ear canal and external ear normal       Mouth/Throat:      Mouth: Mucous membranes are moist    Eyes:      Pupils: Pupils are equal, round, and reactive to light  Neck:      Vascular: No carotid bruit  Cardiovascular:      Rate and Rhythm: Normal rate and regular rhythm  Pulses:           Posterior tibial pulses are 2+ on the right side and 2+ on the left side  Heart sounds: Normal heart sounds  Pulmonary:      Effort: Pulmonary effort is normal  No respiratory distress  Breath sounds: Normal breath sounds and air entry  Musculoskeletal:      Right lower leg: No edema  Left lower leg: No edema  Lymphadenopathy:      Cervical: No cervical adenopathy  Skin:     General: Skin is warm and dry  Neurological:      General: No focal deficit present  Mental Status: He is alert and oriented to person, place, and time  Psychiatric:         Attention and Perception: Attention normal          Mood and Affect: Mood normal          Speech: Speech normal          Behavior: Behavior normal          Thought Content:  Thought content normal          Cognition and Memory: Cognition normal          Judgment: Judgment normal

## 2022-05-07 LAB
6MAM UR QL: NEGATIVE NG/ML
6MAM UR QL: NEGATIVE NG/ML
AMPHET+METHAMPHET UR QL: NEGATIVE NG/ML
AMPHETAMINES UR QL: NEGATIVE NG/ML
BARBITURATES UR QL: NEGATIVE NG/ML
BENZODIAZ UR QL: NEGATIVE NG/ML
BENZODIAZ UR QL: NEGATIVE NG/ML
BUPRENORPHINE UR QL CFM: NEGATIVE NG/ML
BUPRENORPHINE UR QL: NEGATIVE NG/ML
BUTYLONE: NEGATIVE NG/ML
BZE UR QL: NEGATIVE NG/ML
BZE UR QL: NEGATIVE NG/ML
CREAT UR-MCNC: 95.2 MG/DL
ETHANOL UR QL: POSITIVE NG/ML
ETHYL GLUCURONIDE UR-MCNC: ABNORMAL NG/ML
ETHYL SULFATE UR-MCNC: 7390 NG/ML
MDMA UR QL: NEGATIVE NG/ML
MDPV: NEGATIVE NG/ML
MEPHEDRONE: NEGATIVE NG/ML
METHADONE UR QL: NEGATIVE NG/ML
METHYLONE: NEGATIVE NG/ML
OPIATES UR QL: NEGATIVE NG/ML
OPIATES UR QL: NEGATIVE NG/ML
OXIDANTS UR QL: NEGATIVE MCG/ML
OXYCODONE UR QL: NEGATIVE NG/ML
OXYCODONE UR QL: NEGATIVE NG/ML
PH UR: 6.9 [PH] (ref 4.5–9)
REF LAB TEST NAME: NORMAL
REF LAB TEST NAME: NORMAL
REF LAB TEST: NORMAL
REF LAB TEST: NORMAL
SL AMB CLIENT CONTACT: NORMAL
SL AMB CLIENT CONTACT: NORMAL
THC UR QL SCN>20 NG/ML: POSITIVE NG/ML
THC UR QL: POSITIVE NG/ML
THC UR-MCNC: 191 NG/ML

## 2022-05-09 PROBLEM — K21.9 GASTROESOPHAGEAL REFLUX DISEASE: Status: ACTIVE | Noted: 2022-05-09

## 2022-05-09 PROBLEM — J45.909 MODERATE ASTHMA WITHOUT COMPLICATION: Status: ACTIVE | Noted: 2022-05-09

## 2022-05-09 PROBLEM — Z72.0 TOBACCO USE: Status: ACTIVE | Noted: 2022-05-09

## 2022-05-10 NOTE — ASSESSMENT & PLAN NOTE
Inconsistent with Breo  Lungs with wheezes throughout  Encouraged daily use as directed     Smoking cessation encoruaged

## 2022-05-10 NOTE — ASSESSMENT & PLAN NOTE
Appears stable  Compliant with Adderall 20 XL am and 10 afternoon prn  Does admit to taking afternoon dose most days  Feels well controlled  Discussed new policy and contract  Reviewed and signed today  Urine provided for drug screen  Rx refilled today  F/U 6 month  Sooner with any concerns    Note over due for routine lab work  Fasting orders placed   Pt instructed to complete within next few weeks

## 2022-05-10 NOTE — ASSESSMENT & PLAN NOTE
Continues smoking - averages 1 5 PPD  Although cessation encouraged, pt admits to not being ready to quit   Will continue to assess readiness at each subsequent visit

## 2022-05-13 DIAGNOSIS — F90.0 ATTENTION DEFICIT HYPERACTIVITY DISORDER (ADHD), PREDOMINANTLY INATTENTIVE TYPE: Primary | ICD-10-CM

## 2022-05-13 RX ORDER — DEXTROAMPHETAMINE SACCHARATE, AMPHETAMINE ASPARTATE, DEXTROAMPHETAMINE SULFATE AND AMPHETAMINE SULFATE 5; 5; 5; 5 MG/1; MG/1; MG/1; MG/1
20 TABLET ORAL DAILY PRN
Qty: 30 TABLET | Refills: 0 | Status: SHIPPED | OUTPATIENT
Start: 2022-05-13 | End: 2022-05-13 | Stop reason: SDUPTHER

## 2022-05-13 RX ORDER — DEXTROAMPHETAMINE SACCHARATE, AMPHETAMINE ASPARTATE, DEXTROAMPHETAMINE SULFATE AND AMPHETAMINE SULFATE 5; 5; 5; 5 MG/1; MG/1; MG/1; MG/1
20 TABLET ORAL DAILY PRN
Qty: 30 TABLET | Refills: 0 | Status: SHIPPED | OUTPATIENT
Start: 2022-05-13 | End: 2022-06-07 | Stop reason: ALTCHOICE

## 2022-05-13 NOTE — PROGRESS NOTES
Adderall 20 and 20XL that was sent to [facility] was canceled 5/13 with Luis Zelaya   Pt picking up dose Adderall 20 at [address]

## 2022-06-07 DIAGNOSIS — F90.0 ATTENTION DEFICIT HYPERACTIVITY DISORDER (ADHD), PREDOMINANTLY INATTENTIVE TYPE: ICD-10-CM

## 2022-06-07 RX ORDER — DEXTROAMPHETAMINE SACCHARATE, AMPHETAMINE ASPARTATE, DEXTROAMPHETAMINE SULFATE AND AMPHETAMINE SULFATE 3.75; 3.75; 3.75; 3.75 MG/1; MG/1; MG/1; MG/1
15 TABLET ORAL
Qty: 30 TABLET | Refills: 0 | Status: SHIPPED | OUTPATIENT
Start: 2022-06-07 | End: 2022-07-21 | Stop reason: SDUPTHER

## 2022-06-07 RX ORDER — DEXTROAMPHETAMINE SACCHARATE, AMPHETAMINE ASPARTATE, DEXTROAMPHETAMINE SULFATE AND AMPHETAMINE SULFATE 5; 5; 5; 5 MG/1; MG/1; MG/1; MG/1
20 TABLET ORAL DAILY
Qty: 30 TABLET | Refills: 0 | Status: SHIPPED | OUTPATIENT
Start: 2022-06-07 | End: 2022-06-25 | Stop reason: SDUPTHER

## 2022-06-25 DIAGNOSIS — F90.0 ATTENTION DEFICIT HYPERACTIVITY DISORDER (ADHD), PREDOMINANTLY INATTENTIVE TYPE: ICD-10-CM

## 2022-06-27 NOTE — TELEPHONE ENCOUNTER
Last OV 4/27/22    Per PDMP Adderall filled 6/7/22 #30  (Pended rx for earliest fill 7/5/22)    No future appts

## 2022-06-28 DIAGNOSIS — F90.0 ATTENTION DEFICIT HYPERACTIVITY DISORDER (ADHD), PREDOMINANTLY INATTENTIVE TYPE: ICD-10-CM

## 2022-06-28 RX ORDER — DEXTROAMPHETAMINE SACCHARATE, AMPHETAMINE ASPARTATE, DEXTROAMPHETAMINE SULFATE AND AMPHETAMINE SULFATE 5; 5; 5; 5 MG/1; MG/1; MG/1; MG/1
20 TABLET ORAL DAILY
Qty: 30 TABLET | Refills: 0 | Status: SHIPPED | OUTPATIENT
Start: 2022-07-05 | End: 2022-08-09 | Stop reason: SDUPTHER

## 2022-07-21 DIAGNOSIS — F90.0 ATTENTION DEFICIT HYPERACTIVITY DISORDER (ADHD), PREDOMINANTLY INATTENTIVE TYPE: ICD-10-CM

## 2022-07-21 RX ORDER — DEXTROAMPHETAMINE SACCHARATE, AMPHETAMINE ASPARTATE, DEXTROAMPHETAMINE SULFATE AND AMPHETAMINE SULFATE 3.75; 3.75; 3.75; 3.75 MG/1; MG/1; MG/1; MG/1
15 TABLET ORAL
Qty: 30 TABLET | Refills: 0 | Status: SHIPPED | OUTPATIENT
Start: 2022-07-21 | End: 2022-08-23 | Stop reason: SDUPTHER

## 2022-08-09 DIAGNOSIS — F90.0 ATTENTION DEFICIT HYPERACTIVITY DISORDER (ADHD), PREDOMINANTLY INATTENTIVE TYPE: ICD-10-CM

## 2022-08-09 RX ORDER — DEXTROAMPHETAMINE SACCHARATE, AMPHETAMINE ASPARTATE, DEXTROAMPHETAMINE SULFATE AND AMPHETAMINE SULFATE 5; 5; 5; 5 MG/1; MG/1; MG/1; MG/1
20 TABLET ORAL DAILY
Qty: 30 TABLET | Refills: 0 | Status: SHIPPED | OUTPATIENT
Start: 2022-08-09 | End: 2022-09-14 | Stop reason: SDUPTHER

## 2022-08-23 DIAGNOSIS — F90.0 ATTENTION DEFICIT HYPERACTIVITY DISORDER (ADHD), PREDOMINANTLY INATTENTIVE TYPE: ICD-10-CM

## 2022-08-23 RX ORDER — DEXTROAMPHETAMINE SACCHARATE, AMPHETAMINE ASPARTATE, DEXTROAMPHETAMINE SULFATE AND AMPHETAMINE SULFATE 3.75; 3.75; 3.75; 3.75 MG/1; MG/1; MG/1; MG/1
15 TABLET ORAL
Qty: 30 TABLET | Refills: 0 | Status: SHIPPED | OUTPATIENT
Start: 2022-08-23 | End: 2022-10-02 | Stop reason: SDUPTHER

## 2022-09-14 DIAGNOSIS — F90.0 ATTENTION DEFICIT HYPERACTIVITY DISORDER (ADHD), PREDOMINANTLY INATTENTIVE TYPE: ICD-10-CM

## 2022-09-14 RX ORDER — DEXTROAMPHETAMINE SACCHARATE, AMPHETAMINE ASPARTATE, DEXTROAMPHETAMINE SULFATE AND AMPHETAMINE SULFATE 5; 5; 5; 5 MG/1; MG/1; MG/1; MG/1
20 TABLET ORAL DAILY
Qty: 30 TABLET | Refills: 0 | Status: SHIPPED | OUTPATIENT
Start: 2022-09-14 | End: 2022-10-20 | Stop reason: SDUPTHER

## 2022-10-02 DIAGNOSIS — F90.0 ATTENTION DEFICIT HYPERACTIVITY DISORDER (ADHD), PREDOMINANTLY INATTENTIVE TYPE: ICD-10-CM

## 2022-10-02 RX ORDER — DEXTROAMPHETAMINE SACCHARATE, AMPHETAMINE ASPARTATE, DEXTROAMPHETAMINE SULFATE AND AMPHETAMINE SULFATE 3.75; 3.75; 3.75; 3.75 MG/1; MG/1; MG/1; MG/1
15 TABLET ORAL
Qty: 30 TABLET | Refills: 0 | Status: SHIPPED | OUTPATIENT
Start: 2022-10-02 | End: 2022-11-02 | Stop reason: SDUPTHER

## 2022-10-14 DIAGNOSIS — K21.9 GASTROESOPHAGEAL REFLUX DISEASE: ICD-10-CM

## 2022-10-14 DIAGNOSIS — I10 ESSENTIAL HYPERTENSION: ICD-10-CM

## 2022-10-14 RX ORDER — OMEPRAZOLE 20 MG/1
CAPSULE, DELAYED RELEASE ORAL
Qty: 90 CAPSULE | Refills: 1 | Status: SHIPPED | OUTPATIENT
Start: 2022-10-14

## 2022-10-14 RX ORDER — AMLODIPINE BESYLATE 5 MG/1
TABLET ORAL
Qty: 90 TABLET | Refills: 0 | Status: SHIPPED | OUTPATIENT
Start: 2022-10-14

## 2022-10-14 RX ORDER — LISINOPRIL AND HYDROCHLOROTHIAZIDE 25; 20 MG/1; MG/1
TABLET ORAL
Qty: 90 TABLET | Refills: 1 | Status: SHIPPED | OUTPATIENT
Start: 2022-10-14

## 2022-10-20 DIAGNOSIS — F90.0 ATTENTION DEFICIT HYPERACTIVITY DISORDER (ADHD), PREDOMINANTLY INATTENTIVE TYPE: ICD-10-CM

## 2022-10-20 RX ORDER — DEXTROAMPHETAMINE SACCHARATE, AMPHETAMINE ASPARTATE, DEXTROAMPHETAMINE SULFATE AND AMPHETAMINE SULFATE 5; 5; 5; 5 MG/1; MG/1; MG/1; MG/1
20 TABLET ORAL DAILY
Qty: 30 TABLET | Refills: 0 | Status: SHIPPED | OUTPATIENT
Start: 2022-10-20 | End: 2022-11-17 | Stop reason: SDUPTHER

## 2022-11-02 DIAGNOSIS — F90.0 ATTENTION DEFICIT HYPERACTIVITY DISORDER (ADHD), PREDOMINANTLY INATTENTIVE TYPE: ICD-10-CM

## 2022-11-02 RX ORDER — DEXTROAMPHETAMINE SACCHARATE, AMPHETAMINE ASPARTATE, DEXTROAMPHETAMINE SULFATE AND AMPHETAMINE SULFATE 3.75; 3.75; 3.75; 3.75 MG/1; MG/1; MG/1; MG/1
15 TABLET ORAL
Qty: 30 TABLET | Refills: 0 | Status: SHIPPED | OUTPATIENT
Start: 2022-11-02 | End: 2022-12-06 | Stop reason: SDUPTHER

## 2022-11-03 ENCOUNTER — OFFICE VISIT (OUTPATIENT)
Dept: FAMILY MEDICINE CLINIC | Facility: CLINIC | Age: 42
End: 2022-11-03

## 2022-11-03 VITALS
HEIGHT: 69 IN | SYSTOLIC BLOOD PRESSURE: 118 MMHG | TEMPERATURE: 97 F | OXYGEN SATURATION: 99 % | DIASTOLIC BLOOD PRESSURE: 76 MMHG | BODY MASS INDEX: 23.7 KG/M2 | WEIGHT: 160 LBS | HEART RATE: 97 BPM

## 2022-11-03 DIAGNOSIS — I10 ESSENTIAL HYPERTENSION: ICD-10-CM

## 2022-11-03 DIAGNOSIS — Z72.0 TOBACCO ABUSE: ICD-10-CM

## 2022-11-03 DIAGNOSIS — Z13.220 SCREENING FOR LIPID DISORDERS: ICD-10-CM

## 2022-11-03 DIAGNOSIS — Z13.1 SCREENING FOR DIABETES MELLITUS: ICD-10-CM

## 2022-11-03 DIAGNOSIS — J45.909 MODERATE ASTHMA WITHOUT COMPLICATION, UNSPECIFIED WHETHER PERSISTENT: ICD-10-CM

## 2022-11-03 DIAGNOSIS — F90.0 ATTENTION DEFICIT HYPERACTIVITY DISORDER (ADHD), PREDOMINANTLY INATTENTIVE TYPE: Primary | ICD-10-CM

## 2022-11-03 DIAGNOSIS — Z11.4 SCREENING FOR HIV (HUMAN IMMUNODEFICIENCY VIRUS): ICD-10-CM

## 2022-11-03 DIAGNOSIS — Z23 NEED FOR VACCINATION: ICD-10-CM

## 2022-11-03 DIAGNOSIS — Z13.29 SCREENING FOR THYROID DISORDER: ICD-10-CM

## 2022-11-03 DIAGNOSIS — Z11.59 NEED FOR HEPATITIS C SCREENING TEST: ICD-10-CM

## 2022-11-03 DIAGNOSIS — K21.9 GASTROESOPHAGEAL REFLUX DISEASE, UNSPECIFIED WHETHER ESOPHAGITIS PRESENT: ICD-10-CM

## 2022-11-03 DIAGNOSIS — Z13.6 SCREENING FOR CARDIOVASCULAR CONDITION: ICD-10-CM

## 2022-11-03 RX ORDER — NICOTINE 21 MG/24HR
1 PATCH, TRANSDERMAL 24 HOURS TRANSDERMAL EVERY 24 HOURS
Qty: 28 PATCH | Refills: 0 | Status: SHIPPED | OUTPATIENT
Start: 2022-11-03

## 2022-11-03 NOTE — PROGRESS NOTES
[facility]    ASSESSMENT AND PLAN     1  Attention deficit hyperactivity disorder (ADHD), predominantly inattentive type  Assessment & Plan:  Appears stable  Compliant with Adderall 20 am and 15 afternoon prn  Reports taking afternoon dose most days  Good concentration  Focused  No refill needed today  F/U 6 month  Sooner with any concerns      2  Essential hypertension  Assessment & Plan:  Appears well controlled  118/76  Asymptomatic  Compliant with lisinopril-hydrochlorothiazide 20/25; amlodipine 5  Continue same treatment  Follow-up 6 months       3  Need for vaccination  Comments:  Flu vaccine administered today by MA  Orders:  -     influenza vaccine, quadrivalent, 0 5 mL, preservative-free, for adult and pediatric patients 6 mos+ (AFLURIA, FLUARIX, FLULAVAL, FLUZONE)    4  Screening for diabetes mellitus  Comments:  Overdue for lab work  Encouraged compliance today  Fasting orders in  Orders:  -     Comprehensive metabolic panel; Future    5  Screening for thyroid disorder  -     TSH, 3rd generation with Free T4 reflex; Future    6  Screening for lipid disorders  -     Lipid panel; Future    7  Screening for cardiovascular condition  -     CBC and differential; Future    8  Need for hepatitis C screening test  -     Hepatitis C antibody; Future    9  Screening for HIV (human immunodeficiency virus)  -     HIV 1/2 Antigen/Antibody (4th Generation) w Reflex [facility]; Future    10  Tobacco abuse  Assessment & Plan:  Continues smoking roughly 2 packs per day  Patient appears ready to attempt quitting  Options reviewed  Will trial patches again  Four-week sent to pharmacy for when he is ready to start  Orders:  -     nicotine (NICODERM CQ) 21 mg/24 hr TD 24 hr patch; Place 1 patch on the skin every 24 hours    11  Gastroesophageal reflux disease, unspecified whether esophagitis present  Assessment & Plan:  Stable on omeprazole 20      12   Moderate asthma without complication, unspecified whether persistent  Comments:  Self stopped Breo  Notes some occasional wheezing, but feels well respiratory wise  Note:  Patient reports significantly decreasing his alcohol use  States he drinks 2-3 drinks every other week on average  Notes mood and clarity improvement since stopping  Continues to smoke as above  Continues utilizing medical marijuana  Needs to update his card    SUBJECTIVE       Patient ID: Yareli Larsen is a 36 y o  male  Chief Complaint   Patient presents with   • Follow-up       HISTORY OF PRESENT ILLNESS    Routine checkup  No new concerns        The following portions of the patient's history were reviewed and updated as appropriate: allergies, current medications, past family history, past medical history, past social history, past surgical history, and problem list     REVIEW OF SYSTEMS  Review of Systems   Constitutional: Negative  HENT: Negative  Eyes: Negative  Respiratory: Negative  Cardiovascular: Negative  Gastrointestinal: Negative  Genitourinary: Negative  Musculoskeletal: Negative  Skin: Negative  Neurological: Negative  Psychiatric/Behavioral: Negative  Negative for agitation and decreased concentration  The patient is not hyperactive          OBJECTIVE      VITAL SIGNS  /76   Pulse 97   Temp (!) 97 °F (36 1 °C) (Tympanic)   Ht 5' 9" (1 753 m)   Wt 72 6 kg (160 lb)   SpO2 99%   BMI 23 63 kg/m²     CURRENT MEDICATIONS    Current Outpatient Medications:   •  amLODIPine (NORVASC) 5 mg tablet, TAKE 1 TABLET BY MOUTH EVERY DAY, Disp: 90 tablet, Rfl: 0  •  amphetamine-dextroamphetamine (ADDERALL, 15MG,) 15 MG tablet, Take 1 tablet (15 mg total) by mouth daily after lunch Max Daily Amount: 15 mg, Disp: 30 tablet, Rfl: 0  •  amphetamine-dextroamphetamine (ADDERALL, 20MG,) 20 mg tablet, Take 1 tablet (20 mg total) by mouth daily Max Daily Amount: 20 mg, Disp: 30 tablet, Rfl: 0  •  lisinopril-hydrochlorothiazide (PRINZIDE,ZESTORETIC) 20-25 MG per tablet, TAKE 1 TABLET BY MOUTH EVERY DAY, Disp: 90 tablet, Rfl: 1  •  nicotine (NICODERM CQ) 21 mg/24 hr TD 24 hr patch, Place 1 patch on the skin every 24 hours, Disp: 28 patch, Rfl: 0  •  omeprazole (PriLOSEC) 20 mg delayed release capsule, TAKE 1 CAPSULE BY MOUTH EVERY DAY, Disp: 90 capsule, Rfl: 1  •  albuterol (PROVENTIL HFA,VENTOLIN HFA) 90 mcg/act inhaler, Inhale 2 puffs every 6 (six) hours as needed for wheezing or shortness of breath (Patient not taking: No sig reported), Disp: 1 Inhaler, Rfl: 0  •  fluticasone-vilanterol (BREO ELLIPTA) 100-25 mcg/inh inhaler, Inhale 1 puff daily Rinse mouth after use  (Patient not taking: Reported on 11/3/2022), Disp: 60 each, Rfl: 1      PHYSICAL EXAMINATION   Physical Exam  Vitals and nursing note reviewed  Constitutional:       General: He is not in acute distress  Appearance: Normal appearance  He is well-developed and well-groomed  He is not ill-appearing  HENT:      Head: Normocephalic and atraumatic  Right Ear: Ear canal and external ear normal       Left Ear: Ear canal and external ear normal       Ears:      Comments: Excessive cerumen bilateral ears  Advise trialing OTC Debrox  If ineffective, may return for ear lavage  Mouth/Throat:      Mouth: Mucous membranes are moist    Eyes:      Pupils: Pupils are equal, round, and reactive to light  Neck:      Vascular: No carotid bruit  Cardiovascular:      Rate and Rhythm: Normal rate and regular rhythm  Heart sounds: Normal heart sounds  Pulmonary:      Effort: Pulmonary effort is normal  No respiratory distress  Breath sounds: Normal air entry  Wheezes: Few scattered throughout  Musculoskeletal:      Right lower leg: No edema  Left lower leg: No edema  Lymphadenopathy:      Cervical: No cervical adenopathy  Skin:     General: Skin is warm and dry  Neurological:      General: No focal deficit present        Mental Status: He is alert and oriented to person, place, and time  Psychiatric:         Attention and Perception: Attention normal          Mood and Affect: Mood normal          Speech: Speech normal          Behavior: Behavior normal          Thought Content:  Thought content normal          Cognition and Memory: Cognition normal          Judgment: Judgment normal

## 2022-11-03 NOTE — ASSESSMENT & PLAN NOTE
Appears stable  Compliant with Adderall 20 am and 15 afternoon prn  Reports taking afternoon dose most days  Good concentration  Focused  No refill needed today  F/U 6 month   Sooner with any concerns

## 2022-11-03 NOTE — ASSESSMENT & PLAN NOTE
Appears well controlled  118/76  Asymptomatic  Compliant with lisinopril-hydrochlorothiazide 20/25; amlodipine 5  Continue same treatment    Follow-up 6 months

## 2022-11-03 NOTE — ASSESSMENT & PLAN NOTE
Continues smoking roughly 2 packs per day  Patient appears ready to attempt quitting  Options reviewed  Will trial patches again  Four-week sent to pharmacy for when he is ready to start

## 2022-11-17 DIAGNOSIS — F90.0 ATTENTION DEFICIT HYPERACTIVITY DISORDER (ADHD), PREDOMINANTLY INATTENTIVE TYPE: ICD-10-CM

## 2022-11-18 RX ORDER — DEXTROAMPHETAMINE SACCHARATE, AMPHETAMINE ASPARTATE, DEXTROAMPHETAMINE SULFATE AND AMPHETAMINE SULFATE 5; 5; 5; 5 MG/1; MG/1; MG/1; MG/1
20 TABLET ORAL DAILY
Qty: 30 TABLET | Refills: 0 | Status: SHIPPED | OUTPATIENT
Start: 2022-11-18

## 2022-12-06 DIAGNOSIS — F90.0 ATTENTION DEFICIT HYPERACTIVITY DISORDER (ADHD), PREDOMINANTLY INATTENTIVE TYPE: ICD-10-CM

## 2022-12-07 RX ORDER — DEXTROAMPHETAMINE SACCHARATE, AMPHETAMINE ASPARTATE, DEXTROAMPHETAMINE SULFATE AND AMPHETAMINE SULFATE 3.75; 3.75; 3.75; 3.75 MG/1; MG/1; MG/1; MG/1
15 TABLET ORAL
Qty: 30 TABLET | Refills: 0 | Status: SHIPPED | OUTPATIENT
Start: 2022-12-07

## 2022-12-22 DIAGNOSIS — F90.0 ATTENTION DEFICIT HYPERACTIVITY DISORDER (ADHD), PREDOMINANTLY INATTENTIVE TYPE: ICD-10-CM

## 2022-12-22 RX ORDER — DEXTROAMPHETAMINE SACCHARATE, AMPHETAMINE ASPARTATE, DEXTROAMPHETAMINE SULFATE AND AMPHETAMINE SULFATE 5; 5; 5; 5 MG/1; MG/1; MG/1; MG/1
20 TABLET ORAL DAILY
Qty: 30 TABLET | Refills: 0 | Status: SHIPPED | OUTPATIENT
Start: 2022-12-22 | End: 2023-01-25 | Stop reason: SDUPTHER

## 2023-01-10 DIAGNOSIS — F90.0 ATTENTION DEFICIT HYPERACTIVITY DISORDER (ADHD), PREDOMINANTLY INATTENTIVE TYPE: ICD-10-CM

## 2023-01-10 DIAGNOSIS — I10 ESSENTIAL HYPERTENSION: ICD-10-CM

## 2023-01-10 RX ORDER — DEXTROAMPHETAMINE SACCHARATE, AMPHETAMINE ASPARTATE, DEXTROAMPHETAMINE SULFATE AND AMPHETAMINE SULFATE 3.75; 3.75; 3.75; 3.75 MG/1; MG/1; MG/1; MG/1
15 TABLET ORAL
Qty: 30 TABLET | Refills: 0 | Status: SHIPPED | OUTPATIENT
Start: 2023-01-10 | End: 2023-02-07 | Stop reason: SDUPTHER

## 2023-01-10 RX ORDER — AMLODIPINE BESYLATE 5 MG/1
TABLET ORAL
Qty: 90 TABLET | Refills: 0 | Status: SHIPPED | OUTPATIENT
Start: 2023-01-10

## 2023-01-13 DIAGNOSIS — Z72.0 TOBACCO ABUSE: ICD-10-CM

## 2023-01-17 RX ORDER — NICOTINE 21 MG/24HR
1 PATCH, TRANSDERMAL 24 HOURS TRANSDERMAL EVERY 24 HOURS
Qty: 28 PATCH | Refills: 0 | Status: SHIPPED | OUTPATIENT
Start: 2023-01-17

## 2023-01-25 DIAGNOSIS — F90.0 ATTENTION DEFICIT HYPERACTIVITY DISORDER (ADHD), PREDOMINANTLY INATTENTIVE TYPE: ICD-10-CM

## 2023-01-26 RX ORDER — DEXTROAMPHETAMINE SACCHARATE, AMPHETAMINE ASPARTATE, DEXTROAMPHETAMINE SULFATE AND AMPHETAMINE SULFATE 5; 5; 5; 5 MG/1; MG/1; MG/1; MG/1
20 TABLET ORAL DAILY
Qty: 30 TABLET | Refills: 0 | Status: SHIPPED | OUTPATIENT
Start: 2023-01-26

## 2023-02-07 DIAGNOSIS — F90.0 ATTENTION DEFICIT HYPERACTIVITY DISORDER (ADHD), PREDOMINANTLY INATTENTIVE TYPE: ICD-10-CM

## 2023-02-07 RX ORDER — DEXTROAMPHETAMINE SACCHARATE, AMPHETAMINE ASPARTATE, DEXTROAMPHETAMINE SULFATE AND AMPHETAMINE SULFATE 3.75; 3.75; 3.75; 3.75 MG/1; MG/1; MG/1; MG/1
15 TABLET ORAL
Qty: 30 TABLET | Refills: 0 | Status: SHIPPED | OUTPATIENT
Start: 2023-02-10 | End: 2023-03-21 | Stop reason: SDUPTHER

## 2023-02-22 DIAGNOSIS — Z72.0 TOBACCO USE: Primary | ICD-10-CM

## 2023-02-22 RX ORDER — NICOTINE 21 MG/24HR
1 PATCH, TRANSDERMAL 24 HOURS TRANSDERMAL EVERY 24 HOURS
Qty: 28 PATCH | Refills: 0 | Status: SHIPPED | OUTPATIENT
Start: 2023-02-22 | End: 2023-03-21 | Stop reason: DRUGHIGH

## 2023-02-28 DIAGNOSIS — F90.0 ATTENTION DEFICIT HYPERACTIVITY DISORDER (ADHD), PREDOMINANTLY INATTENTIVE TYPE: ICD-10-CM

## 2023-02-28 RX ORDER — DEXTROAMPHETAMINE SACCHARATE, AMPHETAMINE ASPARTATE, DEXTROAMPHETAMINE SULFATE AND AMPHETAMINE SULFATE 5; 5; 5; 5 MG/1; MG/1; MG/1; MG/1
20 TABLET ORAL DAILY
Qty: 30 TABLET | Refills: 0 | Status: SHIPPED | OUTPATIENT
Start: 2023-02-28

## 2023-03-21 DIAGNOSIS — Z72.0 TOBACCO USE: Primary | ICD-10-CM

## 2023-03-21 DIAGNOSIS — F90.0 ATTENTION DEFICIT HYPERACTIVITY DISORDER (ADHD), PREDOMINANTLY INATTENTIVE TYPE: ICD-10-CM

## 2023-03-21 RX ORDER — DEXTROAMPHETAMINE SACCHARATE, AMPHETAMINE ASPARTATE, DEXTROAMPHETAMINE SULFATE AND AMPHETAMINE SULFATE 3.75; 3.75; 3.75; 3.75 MG/1; MG/1; MG/1; MG/1
15 TABLET ORAL
Qty: 30 TABLET | Refills: 0 | Status: SHIPPED | OUTPATIENT
Start: 2023-03-24 | End: 2023-05-09 | Stop reason: SDUPTHER

## 2023-03-21 RX ORDER — DEXTROAMPHETAMINE SACCHARATE, AMPHETAMINE ASPARTATE, DEXTROAMPHETAMINE SULFATE AND AMPHETAMINE SULFATE 5; 5; 5; 5 MG/1; MG/1; MG/1; MG/1
20 TABLET ORAL DAILY
Qty: 30 TABLET | Refills: 0 | Status: SHIPPED | OUTPATIENT
Start: 2023-03-28 | End: 2023-05-16 | Stop reason: SDUPTHER

## 2023-04-07 DIAGNOSIS — I10 ESSENTIAL HYPERTENSION: ICD-10-CM

## 2023-04-07 RX ORDER — AMLODIPINE BESYLATE 5 MG/1
TABLET ORAL
Qty: 90 TABLET | Refills: 0 | Status: SHIPPED | OUTPATIENT
Start: 2023-04-07

## 2023-05-09 DIAGNOSIS — F90.0 ATTENTION DEFICIT HYPERACTIVITY DISORDER (ADHD), PREDOMINANTLY INATTENTIVE TYPE: ICD-10-CM

## 2023-05-09 RX ORDER — DEXTROAMPHETAMINE SACCHARATE, AMPHETAMINE ASPARTATE, DEXTROAMPHETAMINE SULFATE AND AMPHETAMINE SULFATE 3.75; 3.75; 3.75; 3.75 MG/1; MG/1; MG/1; MG/1
15 TABLET ORAL
Qty: 30 TABLET | Refills: 0 | Status: SHIPPED | OUTPATIENT
Start: 2023-05-09 | End: 2023-06-14 | Stop reason: SDUPTHER

## 2023-05-16 DIAGNOSIS — F90.0 ATTENTION DEFICIT HYPERACTIVITY DISORDER (ADHD), PREDOMINANTLY INATTENTIVE TYPE: ICD-10-CM

## 2023-05-16 RX ORDER — DEXTROAMPHETAMINE SACCHARATE, AMPHETAMINE ASPARTATE, DEXTROAMPHETAMINE SULFATE AND AMPHETAMINE SULFATE 5; 5; 5; 5 MG/1; MG/1; MG/1; MG/1
20 TABLET ORAL DAILY
Qty: 30 TABLET | Refills: 0 | Status: SHIPPED | OUTPATIENT
Start: 2023-05-16 | End: 2023-06-14 | Stop reason: SDUPTHER

## 2023-05-16 RX ORDER — DEXTROAMPHETAMINE SACCHARATE, AMPHETAMINE ASPARTATE, DEXTROAMPHETAMINE SULFATE AND AMPHETAMINE SULFATE 5; 5; 5; 5 MG/1; MG/1; MG/1; MG/1
20 TABLET ORAL DAILY
Qty: 30 TABLET | Refills: 0 | Status: CANCELLED | OUTPATIENT
Start: 2023-05-16

## 2023-06-07 ENCOUNTER — APPOINTMENT (OUTPATIENT)
Dept: LAB | Facility: CLINIC | Age: 43
End: 2023-06-07

## 2023-06-07 ENCOUNTER — OFFICE VISIT (OUTPATIENT)
Dept: FAMILY MEDICINE CLINIC | Facility: CLINIC | Age: 43
End: 2023-06-07

## 2023-06-07 VITALS
DIASTOLIC BLOOD PRESSURE: 70 MMHG | RESPIRATION RATE: 16 BRPM | HEART RATE: 99 BPM | BODY MASS INDEX: 23.62 KG/M2 | WEIGHT: 165 LBS | SYSTOLIC BLOOD PRESSURE: 110 MMHG | HEIGHT: 70 IN | OXYGEN SATURATION: 99 % | TEMPERATURE: 98.8 F

## 2023-06-07 DIAGNOSIS — J45.909 MODERATE ASTHMA WITHOUT COMPLICATION, UNSPECIFIED WHETHER PERSISTENT: ICD-10-CM

## 2023-06-07 DIAGNOSIS — Z79.899 OTHER LONG TERM (CURRENT) DRUG THERAPY: ICD-10-CM

## 2023-06-07 DIAGNOSIS — I10 ESSENTIAL HYPERTENSION: ICD-10-CM

## 2023-06-07 DIAGNOSIS — Z72.0 TOBACCO ABUSE: ICD-10-CM

## 2023-06-07 DIAGNOSIS — Z13.1 SCREENING FOR DIABETES MELLITUS: ICD-10-CM

## 2023-06-07 DIAGNOSIS — B36.0 TINEA VERSICOLOR: ICD-10-CM

## 2023-06-07 DIAGNOSIS — Z12.5 SCREENING FOR PROSTATE CANCER: ICD-10-CM

## 2023-06-07 DIAGNOSIS — N52.9 ERECTILE DYSFUNCTION, UNSPECIFIED ERECTILE DYSFUNCTION TYPE: ICD-10-CM

## 2023-06-07 DIAGNOSIS — F90.0 ATTENTION DEFICIT HYPERACTIVITY DISORDER (ADHD), PREDOMINANTLY INATTENTIVE TYPE: Primary | ICD-10-CM

## 2023-06-07 LAB
ALBUMIN SERPL BCP-MCNC: 4.2 G/DL (ref 3.5–5)
ALP SERPL-CCNC: 86 U/L (ref 46–116)
ALT SERPL W P-5'-P-CCNC: 18 U/L (ref 12–78)
ANION GAP SERPL CALCULATED.3IONS-SCNC: 3 MMOL/L (ref 4–13)
AST SERPL W P-5'-P-CCNC: 16 U/L (ref 5–45)
BASOPHILS # BLD AUTO: 0.07 THOUSANDS/ÂΜL (ref 0–0.1)
BASOPHILS NFR BLD AUTO: 1 % (ref 0–1)
BILIRUB SERPL-MCNC: 1.05 MG/DL (ref 0.2–1)
BUN SERPL-MCNC: 16 MG/DL (ref 5–25)
CALCIUM SERPL-MCNC: 9.1 MG/DL (ref 8.3–10.1)
CHLORIDE SERPL-SCNC: 103 MMOL/L (ref 96–108)
CO2 SERPL-SCNC: 28 MMOL/L (ref 21–32)
CREAT SERPL-MCNC: 1.05 MG/DL (ref 0.6–1.3)
EOSINOPHIL # BLD AUTO: 0.3 THOUSAND/ÂΜL (ref 0–0.61)
EOSINOPHIL NFR BLD AUTO: 3 % (ref 0–6)
ERYTHROCYTE [DISTWIDTH] IN BLOOD BY AUTOMATED COUNT: 11.8 % (ref 11.6–15.1)
GFR SERPL CREATININE-BSD FRML MDRD: 86 ML/MIN/1.73SQ M
GLUCOSE P FAST SERPL-MCNC: 70 MG/DL (ref 65–99)
HCT VFR BLD AUTO: 44.1 % (ref 36.5–49.3)
HGB BLD-MCNC: 15.3 G/DL (ref 12–17)
IMM GRANULOCYTES # BLD AUTO: 0.04 THOUSAND/UL (ref 0–0.2)
IMM GRANULOCYTES NFR BLD AUTO: 0 % (ref 0–2)
LYMPHOCYTES # BLD AUTO: 2.25 THOUSANDS/ÂΜL (ref 0.6–4.47)
LYMPHOCYTES NFR BLD AUTO: 24 % (ref 14–44)
MCH RBC QN AUTO: 31.2 PG (ref 26.8–34.3)
MCHC RBC AUTO-ENTMCNC: 34.7 G/DL (ref 31.4–37.4)
MCV RBC AUTO: 90 FL (ref 82–98)
MONOCYTES # BLD AUTO: 1.04 THOUSAND/ÂΜL (ref 0.17–1.22)
MONOCYTES NFR BLD AUTO: 11 % (ref 4–12)
NEUTROPHILS # BLD AUTO: 5.73 THOUSANDS/ÂΜL (ref 1.85–7.62)
NEUTS SEG NFR BLD AUTO: 61 % (ref 43–75)
NRBC BLD AUTO-RTO: 0 /100 WBCS
PLATELET # BLD AUTO: 313 THOUSANDS/UL (ref 149–390)
PMV BLD AUTO: 9.8 FL (ref 8.9–12.7)
POTASSIUM SERPL-SCNC: 3.8 MMOL/L (ref 3.5–5.3)
PROT SERPL-MCNC: 7.6 G/DL (ref 6.4–8.4)
PSA SERPL-MCNC: 0.45 NG/ML (ref 0–4)
RBC # BLD AUTO: 4.9 MILLION/UL (ref 3.88–5.62)
SL AMB POCT HEMOGLOBIN AIC: 5.3 (ref ?–6.5)
SODIUM SERPL-SCNC: 134 MMOL/L (ref 135–147)
WBC # BLD AUTO: 9.43 THOUSAND/UL (ref 4.31–10.16)

## 2023-06-07 PROCEDURE — 80053 COMPREHEN METABOLIC PANEL: CPT

## 2023-06-07 PROCEDURE — G0103 PSA SCREENING: HCPCS

## 2023-06-07 PROCEDURE — 85025 COMPLETE CBC W/AUTO DIFF WBC: CPT

## 2023-06-07 PROCEDURE — 83036 HEMOGLOBIN GLYCOSYLATED A1C: CPT | Performed by: NURSE PRACTITIONER

## 2023-06-07 PROCEDURE — 36415 COLL VENOUS BLD VENIPUNCTURE: CPT

## 2023-06-07 PROCEDURE — 99214 OFFICE O/P EST MOD 30 MIN: CPT | Performed by: NURSE PRACTITIONER

## 2023-06-07 RX ORDER — KETOCONAZOLE 20 MG/ML
1 SHAMPOO TOPICAL ONCE
Qty: 100 ML | Refills: 0 | Status: SHIPPED | OUTPATIENT
Start: 2023-06-07 | End: 2023-06-07

## 2023-06-07 RX ORDER — KETOCONAZOLE 20 MG/G
CREAM TOPICAL DAILY
Qty: 15 G | Refills: 1 | Status: SHIPPED | OUTPATIENT
Start: 2023-06-07

## 2023-06-07 NOTE — ASSESSMENT & PLAN NOTE
Reports currently off medications  Feels well  Lungs clear today   Encourage smoking cessation - see below

## 2023-06-07 NOTE — ASSESSMENT & PLAN NOTE
Reports quitting for a short period of time and recently restarting  Is currently smocking roughly 1/2 ppd  Has patches at home  He does want to quit  Will start 14 patch daily for 4 weeks

## 2023-06-07 NOTE — ASSESSMENT & PLAN NOTE
Appears stable - reports well controlled  Good concentration  Focused  Compliant with Adderall 20 am and 15 afternoon prn  Sleeping well - but only averaging 5 hours night d/t job  Due for repeat urine - last year (-) amphetamine d/t inconsistent use at that time  Reports using daily now - only missing afternoon dose occasionally  Did take today  I do expect Marijuana (has card - but reports currently )  Possible alcohol - last drank few beers over the weekend  Note VERY overdue for lab work  I have asked he complete several times over the last 2 years  Advised no further refills will be given until lab work is completed  Reports he will complete this week

## 2023-06-07 NOTE — ASSESSMENT & PLAN NOTE
In a new relationship  Reporting intermittent  difficulty both initiating and maintaining erection  Discussed possible causes - organic and environmental  Check PSA - advised though to check coverage/cost  Discussed referral to Urology - pt to consider (current not insured)  Discussed possible medications - but needs lab work prior to considering pharmacologic treatments

## 2023-06-07 NOTE — ASSESSMENT & PLAN NOTE
Appears well controlled  110/70 today  Reports self stopping Amlodipine several months ago  He thought it was causing ED  Some mild improvement in ED symptoms - see below  BP controlled, so will not restart  Advise periodic home pressures  F/U 6  Month for BP check here   Sooner with jhony

## 2023-06-07 NOTE — PROGRESS NOTES
[facility]    ASSESSMENT AND PLAN     1  Attention deficit hyperactivity disorder (ADHD), predominantly inattentive type  Assessment & Plan:  Appears stable - reports well controlled  Good concentration  Focused  Compliant with Adderall 20 am and 15 afternoon prn  Sleeping well - but only averaging 5 hours night d/t job  Due for repeat urine - last year (-) amphetamine d/t inconsistent use at that time  Reports using daily now - only missing afternoon dose occasionally  Did take today  I do expect Marijuana (has card - but reports currently )  Possible alcohol - last drank few beers over the weekend  Note VERY overdue for lab work  I have asked he complete several times over the last 2 years  Advised no further refills will be given until lab work is completed  Reports he will complete this week  Orders:  -     [facility] Prescribed Meds and Special Instructions  -     Amphetamines, Methamphetamines  -     Butalbital  -     Phenobarbital  -     Secobarbital  -     Alprazolam  -     Clonazepam  -     Diazepam, Temazepam, Oxazepam  -     Lorazepam  -     Gabapentin  -     Pregabalin  -     Cocaine  -     Heroin  -     Buprenorphine  -     Levorphanol  -     Meperidine  -     Naltrexone  -     Fentanyl  -     Methadone  -     Oxycodone, Oxymorphone  -     Tapentadol  -     THC  -     Tramadol  -     Codeine, Hydrocodone, Hydropmorphone, Morphine  -     Bath Salts  -     Ethyl Glucuronide/Ethyl Sulfate  -     Kratom  -     Spice  -     Methylphenidate  -     Phentermine  -     Validity Oxidant  -     Validity Creatinine  -     Validity pH  -     Validity Specific    2  Tinea versicolor  Assessment & Plan:  Presentation consistent wiht same  Declined referral to Derm  Will trial Nizoral     Orders:  -     ketoconazole (NIZORAL) 2 % shampoo; Apply 1 application  topically once for 1 dose  -     ketoconazole (NIZORAL) 2 % cream; Apply topically daily    3   Moderate asthma without complication, unspecified whether persistent  Assessment & Plan:  Reports currently off medications  Feels well  Lungs clear today  Encourage smoking cessation - see below      4  Essential hypertension  Assessment & Plan:  Appears well controlled  110/70 today  Reports self stopping Amlodipine several months ago  He thought it was causing ED  Some mild improvement in ED symptoms - see below  BP controlled, so will not restart  Advise periodic home pressures  F/U 6  Month for BP check here  Sooner with concenrs    Orders:  -     CBC and differential; Future    5  Tobacco abuse  Assessment & Plan:  Reports quitting for a short period of time and recently restarting  Is currently smocking roughly 1/2 ppd  Has patches at home  He does want to quit  Will start 14 patch daily for 4 weeks  6  Other long term (current) drug therapy  -     CBC and differential; Future  -     [facility] Prescribed Meds and Special Instructions  -     Amphetamines, Methamphetamines  -     Butalbital  -     Phenobarbital  -     Secobarbital  -     Alprazolam  -     Clonazepam  -     Diazepam, Temazepam, Oxazepam  -     Lorazepam  -     Gabapentin  -     Pregabalin  -     Cocaine  -     Heroin  -     Buprenorphine  -     Levorphanol  -     Meperidine  -     Naltrexone  -     Fentanyl  -     Methadone  -     Oxycodone, Oxymorphone  -     Tapentadol  -     THC  -     Tramadol  -     Codeine, Hydrocodone, Hydropmorphone, Morphine  -     Bath Salts  -     Ethyl Glucuronide/Ethyl Sulfate  -     Kratom  -     Spice  -     Methylphenidate  -     Phentermine  -     Validity Oxidant  -     Validity Creatinine  -     Validity pH  -     Validity Specific    7  Screening for diabetes mellitus  -     POCT hemoglobin A1c  -     Comprehensive metabolic panel; Future    8  Erectile dysfunction, unspecified erectile dysfunction type  Assessment & Plan:  In a new relationship  Reporting intermittent  difficulty both initiating and maintaining erection  "Discussed possible causes - organic and environmental  Check PSA - advised though to check coverage/cost  Discussed referral to Urology - pt to consider (current not insured)  Discussed possible medications - but needs lab work prior to considering pharmacologic treatments  Orders:  -     PSA, Total Screen; Future    9  Screening for prostate cancer  -     PSA, Total Screen; Future         SUBJECTIVE       Patient ID: Hong Landry is a 36 y o  male  Chief Complaint   Patient presents with   • Follow-up     Renew meds       HISTORY OF PRESENT ILLNESS    Med check        The following portions of the patient's history were reviewed and updated as appropriate: allergies, current medications, past family history, past medical history, past social history, past surgical history, and problem list     REVIEW OF SYSTEMS  Review of Systems   Constitutional: Negative  Respiratory: Negative  Cardiovascular: Negative  Gastrointestinal: Negative  Genitourinary: Negative  Occasional ED   Skin: Positive for rash  Neurological: Negative  Psychiatric/Behavioral: Negative for decreased concentration, self-injury, sleep disturbance and suicidal ideas  The patient is not nervous/anxious          OBJECTIVE      VITAL SIGNS  /70 (BP Location: Right arm, Patient Position: Sitting, Cuff Size: Standard)   Pulse 99   Temp 98 8 °F (37 1 °C) (Temporal)   Resp 16   Ht 5' 10\" (1 778 m)   Wt 74 8 kg (165 lb)   SpO2 99%   BMI 23 68 kg/m²     CURRENT MEDICATIONS    Current Outpatient Medications:   •  albuterol (PROVENTIL HFA,VENTOLIN HFA) 90 mcg/act inhaler, Inhale 2 puffs every 6 (six) hours as needed for wheezing or shortness of breath, Disp: 1 Inhaler, Rfl: 0  •  amphetamine-dextroamphetamine (ADDERALL, 15MG,) 15 MG tablet, Take 1 tablet (15 mg total) by mouth daily after lunch Max Daily Amount: 15 mg, Disp: 30 tablet, Rfl: 0  •  amphetamine-dextroamphetamine (ADDERALL, 20MG,) 20 mg tablet, Take 1 " tablet (20 mg total) by mouth daily Max Daily Amount: 20 mg, Disp: 30 tablet, Rfl: 0  •  ketoconazole (NIZORAL) 2 % cream, Apply topically daily, Disp: 15 g, Rfl: 1  •  ketoconazole (NIZORAL) 2 % shampoo, Apply 1 application  topically once for 1 dose, Disp: 100 mL, Rfl: 0  •  lisinopril-hydrochlorothiazide (PRINZIDE,ZESTORETIC) 20-25 MG per tablet, TAKE 1 TABLET BY MOUTH EVERY DAY, Disp: 90 tablet, Rfl: 1  •  omeprazole (PriLOSEC) 20 mg delayed release capsule, TAKE 1 CAPSULE BY MOUTH EVERY DAY, Disp: 90 capsule, Rfl: 1  •  fluticasone-vilanterol (BREO ELLIPTA) 100-25 mcg/inh inhaler, Inhale 1 puff daily Rinse mouth after use  (Patient not taking: Reported on 6/7/2023), Disp: 60 each, Rfl: 1  •  nicotine (NICODERM CQ) 7 mg/24hr TD 24 hr patch, Place 1 patch on the skin over 24 hours every 24 hours (Patient not taking: Reported on 6/7/2023), Disp: 28 patch, Rfl: 0      PHYSICAL EXAMINATION   Physical Exam  Vitals and nursing note reviewed  Constitutional:       General: He is not in acute distress  Appearance: Normal appearance  He is not ill-appearing  HENT:      Head: Normocephalic  Right Ear: Tympanic membrane and ear canal normal       Left Ear: Tympanic membrane and ear canal normal    Eyes:      Pupils: Pupils are equal, round, and reactive to light  Cardiovascular:      Rate and Rhythm: Normal rate and regular rhythm  Heart sounds: Normal heart sounds  Pulmonary:      Effort: Pulmonary effort is normal  No respiratory distress  Breath sounds: Normal breath sounds and air entry  No wheezing  Musculoskeletal:      Right lower leg: No edema  Left lower leg: No edema  Neurological:      Mental Status: He is alert and oriented to person, place, and time  Psychiatric:         Attention and Perception: Attention normal          Mood and Affect: Mood normal          Behavior: Behavior normal          Thought Content:  Thought content normal          Judgment: Judgment normal

## 2023-06-11 LAB
6MAM UR QL CFM: NEGATIVE NG/ML
7AMINOCLONAZEPAM UR QL CFM: NEGATIVE NG/ML
A-OH ALPRAZ UR QL CFM: NEGATIVE NG/ML
ACCEPTABLE CREAT UR QL: NORMAL MG/DL
ACCEPTIBLE SP GR UR QL: NORMAL
AMPHET UR QL CFM: NORMAL NG/ML
BUPRENORPHINE UR QL CFM: NEGATIVE NG/ML
BUTALBITAL UR QL CFM: NEGATIVE NG/ML
BZE UR QL CFM: NEGATIVE NG/ML
CODEINE UR QL CFM: NEGATIVE NG/ML
EDDP UR QL CFM: NEGATIVE NG/ML
ETHYL GLUCURONIDE UR QL CFM: ABNORMAL NG/ML
ETHYL SULFATE UR QL SCN: ABNORMAL NG/ML
EUTYLONE UR QL: NEGATIVE NG/ML
FENTANYL UR QL CFM: NEGATIVE NG/ML
GLIADIN IGG SER IA-ACNC: NEGATIVE NG/ML
HYDROCODONE UR QL CFM: NEGATIVE NG/ML
HYDROMORPHONE UR QL CFM: NEGATIVE NG/ML
LORAZEPAM UR QL CFM: NEGATIVE NG/ML
ME-PHENIDATE UR QL CFM: NEGATIVE NG/ML
MEPERIDINE UR QL CFM: NEGATIVE NG/ML
METHADONE UR QL CFM: NEGATIVE NG/ML
METHAMPHET UR QL CFM: NEGATIVE NG/ML
MORPHINE UR QL CFM: NEGATIVE NG/ML
NALTREXONE UR QL CFM: NEGATIVE NG/ML
NITRITE UR QL: NORMAL UG/ML
NORBUPRENORPHINE UR QL CFM: NEGATIVE NG/ML
NORDIAZEPAM UR QL CFM: NEGATIVE NG/ML
NORFENTANYL UR QL CFM: NEGATIVE NG/ML
NORHYDROCODONE UR QL CFM: NEGATIVE NG/ML
NORMEPERIDINE UR QL CFM: NEGATIVE NG/ML
NOROXYCODONE UR QL CFM: NEGATIVE NG/ML
OXAZEPAM UR QL CFM: NEGATIVE NG/ML
OXYCODONE UR QL CFM: NEGATIVE NG/ML
OXYMORPHONE UR QL CFM: NEGATIVE NG/ML
PARA-FLUOROFENTANYL QUANTIFICATION: NORMAL NG/ML
PHENOBARB UR QL CFM: NEGATIVE NG/ML
RESULT ALL_PRESCRIBED MEDS AND SPECIAL INSTRUCTIONS: NORMAL
SECOBARBITAL UR QL CFM: NEGATIVE NG/ML
SL AMB 4-ANPP QUANTIFICATION: NORMAL NG/ML
SL AMB 5F-ADB-M7 METABOLITE QUANTIFICATION: NEGATIVE NG/ML
SL AMB 7-OH-MITRAGYNINE (KRATOM ALKALOID) QUANTIFICATION: NEGATIVE NG/ML
SL AMB AB-FUBINACA-M3 METABOLITE QUANTIFICATION: NEGATIVE NG/ML
SL AMB ACETYL FENTANYL QUANTIFICATION: NORMAL NG/ML
SL AMB ACETYL NORFENTANYL QUANTIFICATION: NORMAL NG/ML
SL AMB ACRYL FENTANYL QUANTIFICATION: NORMAL NG/ML
SL AMB CARFENTANIL QUANTIFICATION: NORMAL NG/ML
SL AMB CTHC (MARIJUANA METABOLITE) QUANTIFICATION: ABNORMAL NG/ML
SL AMB DEXTRORPHAN (DEXTROMETHORPHAN METABOLITE) QUANT: NEGATIVE NG/ML
SL AMB GABAPENTIN QUANTIFICATION: NEGATIVE
SL AMB JWH018 METABOLITE QUANTIFICATION: NEGATIVE NG/ML
SL AMB JWH073 METABOLITE QUANTIFICATION: NEGATIVE NG/ML
SL AMB MDMB-FUBINACA-M1 METABOLITE QUANTIFICATION: NEGATIVE NG/ML
SL AMB METHYLONE QUANTIFICATION: NEGATIVE NG/ML
SL AMB N-DESMETHYL-TRAMADOL QUANTIFICATION: NEGATIVE NG/ML
SL AMB PHENTERMINE QUANTIFICATION: NEGATIVE NG/ML
SL AMB PREGABALIN QUANTIFICATION: NEGATIVE
SL AMB RCS4 METABOLITE QUANTIFICATION: NEGATIVE NG/ML
SL AMB RITALINIC ACID QUANTIFICATION: NEGATIVE NG/ML
SMOOTH MUSCLE AB TITR SER IF: NEGATIVE NG/ML
SPECIMEN DRAWN SERPL: NEGATIVE NG/ML
SPECIMEN PH ACCEPTABLE UR: NORMAL
TAPENTADOL UR QL CFM: NEGATIVE NG/ML
TEMAZEPAM UR QL CFM: NEGATIVE NG/ML
TRAMADOL UR QL CFM: NEGATIVE NG/ML
URATE/CREAT 24H UR: NEGATIVE NG/ML

## 2023-06-14 DIAGNOSIS — F90.0 ATTENTION DEFICIT HYPERACTIVITY DISORDER (ADHD), PREDOMINANTLY INATTENTIVE TYPE: ICD-10-CM

## 2023-06-14 RX ORDER — DEXTROAMPHETAMINE SACCHARATE, AMPHETAMINE ASPARTATE, DEXTROAMPHETAMINE SULFATE AND AMPHETAMINE SULFATE 3.75; 3.75; 3.75; 3.75 MG/1; MG/1; MG/1; MG/1
15 TABLET ORAL
Qty: 30 TABLET | Refills: 0 | Status: SHIPPED | OUTPATIENT
Start: 2023-06-14 | End: 2023-07-18 | Stop reason: SDUPTHER

## 2023-06-14 RX ORDER — DEXTROAMPHETAMINE SACCHARATE, AMPHETAMINE ASPARTATE, DEXTROAMPHETAMINE SULFATE AND AMPHETAMINE SULFATE 5; 5; 5; 5 MG/1; MG/1; MG/1; MG/1
20 TABLET ORAL DAILY
Qty: 30 TABLET | Refills: 0 | Status: SHIPPED | OUTPATIENT
Start: 2023-06-14 | End: 2023-07-18 | Stop reason: SDUPTHER

## 2023-07-18 DIAGNOSIS — F90.0 ATTENTION DEFICIT HYPERACTIVITY DISORDER (ADHD), PREDOMINANTLY INATTENTIVE TYPE: ICD-10-CM

## 2023-07-18 RX ORDER — DEXTROAMPHETAMINE SACCHARATE, AMPHETAMINE ASPARTATE, DEXTROAMPHETAMINE SULFATE AND AMPHETAMINE SULFATE 3.75; 3.75; 3.75; 3.75 MG/1; MG/1; MG/1; MG/1
15 TABLET ORAL
Qty: 30 TABLET | Refills: 0 | Status: SHIPPED | OUTPATIENT
Start: 2023-07-18 | End: 2023-08-16 | Stop reason: SDUPTHER

## 2023-07-18 RX ORDER — DEXTROAMPHETAMINE SACCHARATE, AMPHETAMINE ASPARTATE, DEXTROAMPHETAMINE SULFATE AND AMPHETAMINE SULFATE 5; 5; 5; 5 MG/1; MG/1; MG/1; MG/1
20 TABLET ORAL DAILY
Qty: 30 TABLET | Refills: 0 | Status: SHIPPED | OUTPATIENT
Start: 2023-07-18 | End: 2023-08-16 | Stop reason: SDUPTHER

## 2023-08-16 DIAGNOSIS — F90.0 ATTENTION DEFICIT HYPERACTIVITY DISORDER (ADHD), PREDOMINANTLY INATTENTIVE TYPE: ICD-10-CM

## 2023-08-16 RX ORDER — DEXTROAMPHETAMINE SACCHARATE, AMPHETAMINE ASPARTATE, DEXTROAMPHETAMINE SULFATE AND AMPHETAMINE SULFATE 5; 5; 5; 5 MG/1; MG/1; MG/1; MG/1
20 TABLET ORAL DAILY
Qty: 30 TABLET | Refills: 0 | Status: CANCELLED | OUTPATIENT
Start: 2023-08-16

## 2023-08-16 RX ORDER — DEXTROAMPHETAMINE SACCHARATE, AMPHETAMINE ASPARTATE, DEXTROAMPHETAMINE SULFATE AND AMPHETAMINE SULFATE 3.75; 3.75; 3.75; 3.75 MG/1; MG/1; MG/1; MG/1
15 TABLET ORAL
Qty: 30 TABLET | Refills: 0 | Status: CANCELLED | OUTPATIENT
Start: 2023-08-16

## 2023-08-16 RX ORDER — DEXTROAMPHETAMINE SACCHARATE, AMPHETAMINE ASPARTATE, DEXTROAMPHETAMINE SULFATE AND AMPHETAMINE SULFATE 5; 5; 5; 5 MG/1; MG/1; MG/1; MG/1
20 TABLET ORAL DAILY
Qty: 30 TABLET | Refills: 0 | Status: SHIPPED | OUTPATIENT
Start: 2023-08-16 | End: 2023-09-20 | Stop reason: SDUPTHER

## 2023-08-16 RX ORDER — DEXTROAMPHETAMINE SACCHARATE, AMPHETAMINE ASPARTATE, DEXTROAMPHETAMINE SULFATE AND AMPHETAMINE SULFATE 3.75; 3.75; 3.75; 3.75 MG/1; MG/1; MG/1; MG/1
15 TABLET ORAL
Qty: 30 TABLET | Refills: 0 | Status: SHIPPED | OUTPATIENT
Start: 2023-08-16 | End: 2023-09-20 | Stop reason: SDUPTHER

## 2023-09-20 DIAGNOSIS — F90.0 ATTENTION DEFICIT HYPERACTIVITY DISORDER (ADHD), PREDOMINANTLY INATTENTIVE TYPE: ICD-10-CM

## 2023-09-20 RX ORDER — DEXTROAMPHETAMINE SACCHARATE, AMPHETAMINE ASPARTATE, DEXTROAMPHETAMINE SULFATE AND AMPHETAMINE SULFATE 3.75; 3.75; 3.75; 3.75 MG/1; MG/1; MG/1; MG/1
15 TABLET ORAL
Qty: 30 TABLET | Refills: 0 | Status: SHIPPED | OUTPATIENT
Start: 2023-09-20 | End: 2023-10-18 | Stop reason: SDUPTHER

## 2023-09-20 RX ORDER — DEXTROAMPHETAMINE SACCHARATE, AMPHETAMINE ASPARTATE, DEXTROAMPHETAMINE SULFATE AND AMPHETAMINE SULFATE 5; 5; 5; 5 MG/1; MG/1; MG/1; MG/1
20 TABLET ORAL DAILY
Qty: 30 TABLET | Refills: 0 | Status: SHIPPED | OUTPATIENT
Start: 2023-09-20 | End: 2023-10-18 | Stop reason: SDUPTHER

## 2023-10-18 DIAGNOSIS — F90.0 ATTENTION DEFICIT HYPERACTIVITY DISORDER (ADHD), PREDOMINANTLY INATTENTIVE TYPE: ICD-10-CM

## 2023-10-18 RX ORDER — DEXTROAMPHETAMINE SACCHARATE, AMPHETAMINE ASPARTATE, DEXTROAMPHETAMINE SULFATE AND AMPHETAMINE SULFATE 5; 5; 5; 5 MG/1; MG/1; MG/1; MG/1
20 TABLET ORAL DAILY
Qty: 30 TABLET | Refills: 0 | Status: SHIPPED | OUTPATIENT
Start: 2023-10-18 | End: 2023-11-15 | Stop reason: SDUPTHER

## 2023-10-18 RX ORDER — DEXTROAMPHETAMINE SACCHARATE, AMPHETAMINE ASPARTATE, DEXTROAMPHETAMINE SULFATE AND AMPHETAMINE SULFATE 3.75; 3.75; 3.75; 3.75 MG/1; MG/1; MG/1; MG/1
15 TABLET ORAL
Qty: 30 TABLET | Refills: 0 | Status: SHIPPED | OUTPATIENT
Start: 2023-10-18 | End: 2023-11-15 | Stop reason: SDUPTHER

## 2023-10-24 DIAGNOSIS — I10 ESSENTIAL HYPERTENSION: ICD-10-CM

## 2023-10-24 DIAGNOSIS — K21.9 GASTROESOPHAGEAL REFLUX DISEASE: ICD-10-CM

## 2023-10-24 RX ORDER — OMEPRAZOLE 20 MG/1
CAPSULE, DELAYED RELEASE ORAL
Qty: 90 CAPSULE | Refills: 1 | Status: SHIPPED | OUTPATIENT
Start: 2023-10-24

## 2023-10-24 RX ORDER — LISINOPRIL AND HYDROCHLOROTHIAZIDE 25; 20 MG/1; MG/1
TABLET ORAL
Qty: 90 TABLET | Refills: 1 | Status: SHIPPED | OUTPATIENT
Start: 2023-10-24

## 2023-11-15 DIAGNOSIS — F90.0 ATTENTION DEFICIT HYPERACTIVITY DISORDER (ADHD), PREDOMINANTLY INATTENTIVE TYPE: ICD-10-CM

## 2023-11-15 RX ORDER — DEXTROAMPHETAMINE SACCHARATE, AMPHETAMINE ASPARTATE, DEXTROAMPHETAMINE SULFATE AND AMPHETAMINE SULFATE 5; 5; 5; 5 MG/1; MG/1; MG/1; MG/1
20 TABLET ORAL DAILY
Qty: 30 TABLET | Refills: 0 | Status: CANCELLED | OUTPATIENT
Start: 2023-11-15

## 2023-11-15 RX ORDER — DEXTROAMPHETAMINE SACCHARATE, AMPHETAMINE ASPARTATE, DEXTROAMPHETAMINE SULFATE AND AMPHETAMINE SULFATE 3.75; 3.75; 3.75; 3.75 MG/1; MG/1; MG/1; MG/1
15 TABLET ORAL
Qty: 30 TABLET | Refills: 0 | Status: SHIPPED | OUTPATIENT
Start: 2023-11-18

## 2023-11-15 RX ORDER — DEXTROAMPHETAMINE SACCHARATE, AMPHETAMINE ASPARTATE, DEXTROAMPHETAMINE SULFATE AND AMPHETAMINE SULFATE 5; 5; 5; 5 MG/1; MG/1; MG/1; MG/1
20 TABLET ORAL DAILY
Qty: 30 TABLET | Refills: 0 | Status: SHIPPED | OUTPATIENT
Start: 2023-11-18

## 2023-11-15 RX ORDER — DEXTROAMPHETAMINE SACCHARATE, AMPHETAMINE ASPARTATE, DEXTROAMPHETAMINE SULFATE AND AMPHETAMINE SULFATE 3.75; 3.75; 3.75; 3.75 MG/1; MG/1; MG/1; MG/1
15 TABLET ORAL
Qty: 30 TABLET | Refills: 0 | Status: CANCELLED | OUTPATIENT
Start: 2023-11-15

## 2023-11-20 NOTE — TELEPHONE ENCOUNTER
Spoke with pt, pt declined to schedule at call, stating that he was at work. Pt stated that he would c/b to schedule annual physical for December.

## 2023-12-23 NOTE — ASSESSMENT & PLAN NOTE
PROGRESS NOTE     Subjective     Don is a 57 year old here for Follow-up     A 57-year-old presents for a follow-up of multiple medical conditions.    Patient has given consent to record this visit for documentation in their clinical record.    Historian: Self.    Screen for colon cancer: Overdue.     Hypertriglyceridemia: Currently, on Choline Fenofibrate 135 mg daily. Due for labs.    Essential hypertension: Currently, on losartan 50 mg daily. States the blood pressure is much better at home, ranging at 116-130 mmHg.    Paroxysmal atrial fibrillation: Currently, on metoprolol succinate 25 mg, 75 mg daily. Under the care of Nela Stubbs MD.    Mild intermittent asthma without complication: Currently, on montelukast 10 mg daily and albuterol as needed.     Gastroesophageal reflux disease, unspecified whether esophagitis present: Currently, on omeprazole 40 mg daily. Compliant with medication as recommended by GI, Hector Saldana MD.    Need for vaccination: Due for influenza, COVID-19 booster, pneumonia, shingles, and tetanus vaccines.    Additional comments    Diet and exercise: Reports that he did lose weight as recommended in the last visit.    Review of Systems  Constitutional: As Per HPI.  Respiratory: As Per HPI.   Cardiovascular: As Per HPI.   Gastrointestinal: As Per HPI.  Allergic/Immunologic: As Per HPI.  Hematological: As Per HPI.       SDOH Never Smoker       Objective   Vitals:    12/22/23 0801   BP: 135/81   Pulse: (!) 58   SpO2: 98%   Weight: 84 kg (185 lb 3.2 oz)   Height: 5' 7\"     Physical Exam  Constitutional: In no acute distress. Well-developed.  Eyes: The conjunctiva exhibited no abnormalities. The sclera was normal.  Neck: No lymphadenopathy.   Pulmonary: Breath sounds clear to auscultation bilaterally, but no respiratory distress and normal respiratory rate and effort.   Cardiovascular: Normal rate, regular rhythm, normal S1, normal S2 and edema was not present in the  Patient is a TD is generally well controlled on 20 milligrams a day though he does on some occasions require 2nd dosage  We will prescribe him sufficient amount that he can take a 2nd dosage if in when he should needed  lower extremities.   Skin: Skin moisture and turgor normal.  Psychiatric: Oriented to time, place, and person. The mood was normal. The affect was normal. The memory was unimpaired.        ASSESSMENT AND PLAN        1. Screen for colon cancer  -     OPEN ACCESS COLONOSCOPY  2. Hypertriglyceridemia  -     Choline Fenofibrate (Fenofibric Acid) 135 MG CAPSULE DELAYED RELEASE; Take 1 capsule by mouth daily.  3. Essential hypertension  -     losartan (COZAAR) 50 MG tablet; Take 1 tablet by mouth daily.  4. Paroxysmal atrial fibrillation (CMD)  -     metoPROLOL succinate (TOPROL-XL) 25 MG 24 hr tablet; Take 3 tablets by mouth daily.  5. Mild intermittent asthma without complication  -     montelukast (SINGULAIR) 10 MG tablet; Take 1 tablet by mouth every evening.  6. Gastroesophageal reflux disease, unspecified whether esophagitis present  -     omeprazole (PriLOSEC) 40 MG capsule; Take 1 capsule by mouth daily.  7. Need for vaccination  -     INFLUENZA QUADRIVALENT SPLIT PRES FREE 0.5 ML VACC, IM (FLULAVAL,FLUARIX,FLUZONE)  -     TETANUS DIPHTHERIA ACELLULAR PERTUSSIS VACC, 10+ YRS (BOOSTRIX)    PLAN    Screen for colon cancer:  Recommended colonoscopy. Provided the contact number.    Hypertriglyceridemia:  Continue Choline Fenofibrate 135 mg daily. Refills provided.    Essential hypertension:  Stable.  Continue losartan 50 mg daily. Refills provided.    Paroxysmal atrial fibrillation:  Continue metoPROLOL succinate 25 mg, 75 mg daily. Refills provided.  Under the care of Nela Stubbs MD.    Mild intermittent asthma without complication:  Continue montelukast 10 mg daily and albuterol as needed. Refills provided.  Continue albuterol as needed.     Gastroesophageal reflux disease, unspecified whether esophagitis present:  Continue omeprazole 40 mg daily. Refills provided.    Need for vaccination:   Administered influenza and tetanus vaccines today in the office.  Recommended COVID-19 booster, pneumonia, and  shingles vaccines; however, the patient declined.  Discussed the rationale in detail.    The patient understands and agrees with the plan.    Follow up in six months.     Follow Up  Return in about 6 months (around 6/22/2024) for physical, follow up.      Refer to orders.  Medical compliance with plan discussed and risks of non-compliance reviewed.  Patient education completed on disease process, etiology & prognosis.  Proper usage and side effects of medications reviewed & discussed.  Return to clinic as clinically indicated as discussed with patient who verbalized understanding of the plan and is in agreement with the plan.    Igerard, have created a visit summary document based on the audio recording between Dr Nelly Acevedo and this patient for the physician to review, edit as needed, and authenticate.  Creation Date: 12/22/23  I have reviewed and edited the visit summary above and attest that it is accurate.

## 2023-12-26 DIAGNOSIS — F90.0 ATTENTION DEFICIT HYPERACTIVITY DISORDER (ADHD), PREDOMINANTLY INATTENTIVE TYPE: ICD-10-CM

## 2023-12-27 RX ORDER — DEXTROAMPHETAMINE SACCHARATE, AMPHETAMINE ASPARTATE, DEXTROAMPHETAMINE SULFATE AND AMPHETAMINE SULFATE 3.75; 3.75; 3.75; 3.75 MG/1; MG/1; MG/1; MG/1
15 TABLET ORAL
Qty: 30 TABLET | Refills: 0 | Status: SHIPPED | OUTPATIENT
Start: 2023-12-27

## 2023-12-27 RX ORDER — DEXTROAMPHETAMINE SACCHARATE, AMPHETAMINE ASPARTATE, DEXTROAMPHETAMINE SULFATE AND AMPHETAMINE SULFATE 5; 5; 5; 5 MG/1; MG/1; MG/1; MG/1
20 TABLET ORAL DAILY
Qty: 30 TABLET | Refills: 0 | Status: SHIPPED | OUTPATIENT
Start: 2023-12-27

## 2024-01-17 ENCOUNTER — OFFICE VISIT (OUTPATIENT)
Dept: FAMILY MEDICINE CLINIC | Facility: CLINIC | Age: 44
End: 2024-01-17

## 2024-01-17 VITALS
HEART RATE: 93 BPM | DIASTOLIC BLOOD PRESSURE: 82 MMHG | BODY MASS INDEX: 26.66 KG/M2 | WEIGHT: 180 LBS | OXYGEN SATURATION: 97 % | TEMPERATURE: 98.7 F | SYSTOLIC BLOOD PRESSURE: 138 MMHG | RESPIRATION RATE: 16 BRPM | HEIGHT: 69 IN

## 2024-01-17 DIAGNOSIS — J45.909 MODERATE ASTHMA WITHOUT COMPLICATION, UNSPECIFIED WHETHER PERSISTENT: ICD-10-CM

## 2024-01-17 DIAGNOSIS — Z72.0 TOBACCO ABUSE: ICD-10-CM

## 2024-01-17 DIAGNOSIS — I10 ESSENTIAL HYPERTENSION: Primary | ICD-10-CM

## 2024-01-17 DIAGNOSIS — F90.0 ATTENTION DEFICIT HYPERACTIVITY DISORDER (ADHD), PREDOMINANTLY INATTENTIVE TYPE: ICD-10-CM

## 2024-01-17 DIAGNOSIS — Z59.89 UNINSURED: ICD-10-CM

## 2024-01-17 DIAGNOSIS — K21.9 GASTROESOPHAGEAL REFLUX DISEASE: ICD-10-CM

## 2024-01-17 PROBLEM — Z59.71 UNINSURED: Status: ACTIVE | Noted: 2024-01-17

## 2024-01-17 PROCEDURE — 99213 OFFICE O/P EST LOW 20 MIN: CPT | Performed by: NURSE PRACTITIONER

## 2024-01-17 SDOH — ECONOMIC STABILITY - INCOME SECURITY: OTHER PROBLEMS RELATED TO HOUSING AND ECONOMIC CIRCUMSTANCES: Z59.89

## 2024-01-17 NOTE — ASSESSMENT & PLAN NOTE
Reports stable - reports well controlled.     Good concentration.  Focused. Sleeping well at night  Compliant with Adderall 20 am and 15 afternoon prn.   Due for urine in June  PMED verified with no red flags

## 2024-01-17 NOTE — PROGRESS NOTES
[facility]    ASSESSMENT AND PLAN     1. Essential hypertension  Assessment & Plan:  Appears moderately controlled  Reports drinking several cups coffee this am  BP today: 138/82  Continue Lisinopril-HCTZ 20-25  Advise periodic home pressure checks  F/U 6 month - sooner if BP elevated  Goal <130/80      2. Moderate asthma without complication, unspecified whether persistent  Assessment & Plan:  Reports currently off medications.   Feels well.   Lungs clear today.   Encourage smoking cessation          3. Attention deficit hyperactivity disorder (ADHD), predominantly inattentive type  Assessment & Plan:  Reports stable - reports well controlled.     Good concentration.  Focused. Sleeping well at night  Compliant with Adderall 20 am and 15 afternoon prn.   Due for urine in June  PMED verified with no red flags         4. Tobacco abuse  Assessment & Plan:  Cessation encouraged      5. Gastroesophageal reflux disease  Assessment & Plan:  Occasional flares  Recognizes triggers  Continue Omeprazole 20  Tums PRN  F/U if symptoms persist  Consider GI evaluation      6. Uninsured  Assessment & Plan:  Still uninsured. Hopeful to get insurance in next few months. If unable - he was advised to contact here and I will place social work referral to assist with any available resources.         Preventative care: Due for flu vac - pt to get through pharmacy (cost); Due for colonoscopy at age 39 in July; Due for lab work in July; Due for drug screen in July; Possible GI eval if GERD worsens; needs to update medical marijuana card. Lung screen CT at age 49  Reports not drinking often at all anymore - social only    SUBJECTIVE       Patient ID: Paresh Ortiz is a 43 y.o. male.    Chief Complaint   Patient presents with    Physical Exam     Deferred vaccines today due to lack of insurance, recommended checking with local pharmacies for cheaper cost       HISTORY OF PRESENT ILLNESS    Routine follow up  No new concerns  No  "insurance yet          The following portions of the patient's history were reviewed and updated as appropriate: allergies, current medications, past family history, past medical history, past social history, past surgical history, and problem list.    REVIEW OF SYSTEMS  Review of Systems   Constitutional: Negative.    HENT: Negative.     Eyes: Negative.    Respiratory: Negative.     Cardiovascular: Negative.    Gastrointestinal: Negative.    Genitourinary: Negative.    Musculoskeletal: Negative.    Skin: Negative.    Neurological: Negative.    Psychiatric/Behavioral: Negative.         OBJECTIVE      VITAL SIGNS  /82 (BP Location: Right arm, Patient Position: Sitting, Cuff Size: Standard)   Pulse 93   Temp 98.7 °F (37.1 °C) (Temporal)   Resp 16   Ht 5' 8.75\" (1.746 m)   Wt 81.6 kg (180 lb)   SpO2 97%   BMI 26.78 kg/m²     CURRENT MEDICATIONS    Current Outpatient Medications:     albuterol (PROVENTIL HFA,VENTOLIN HFA) 90 mcg/act inhaler, Inhale 2 puffs every 6 (six) hours as needed for wheezing or shortness of breath, Disp: 1 Inhaler, Rfl: 0    amphetamine-dextroamphetamine (ADDERALL, 15MG,) 15 MG tablet, Take 1 tablet (15 mg total) by mouth daily after lunch Max Daily Amount: 15 mg, Disp: 30 tablet, Rfl: 0    amphetamine-dextroamphetamine (ADDERALL, 20MG,) 20 mg tablet, Take 1 tablet (20 mg total) by mouth daily Max Daily Amount: 20 mg, Disp: 30 tablet, Rfl: 0    lisinopril-hydrochlorothiazide (PRINZIDE,ZESTORETIC) 20-25 MG per tablet, TAKE 1 TABLET BY MOUTH EVERY DAY, Disp: 90 tablet, Rfl: 1    omeprazole (PriLOSEC) 20 mg delayed release capsule, TAKE 1 CAPSULE BY MOUTH EVERY DAY, Disp: 90 capsule, Rfl: 1    fluticasone-vilanterol (BREO ELLIPTA) 100-25 mcg/inh inhaler, Inhale 1 puff daily Rinse mouth after use. (Patient not taking: Reported on 6/7/2023), Disp: 60 each, Rfl: 1    ketoconazole (NIZORAL) 2 % cream, Apply topically daily (Patient not taking: Reported on 1/17/2024), Disp: 15 g, Rfl: 1    " ketoconazole (NIZORAL) 2 % shampoo, Apply 1 application. topically once for 1 dose, Disp: 100 mL, Rfl: 0    nicotine (NICODERM CQ) 7 mg/24hr TD 24 hr patch, Place 1 patch on the skin over 24 hours every 24 hours (Patient not taking: Reported on 6/7/2023), Disp: 28 patch, Rfl: 0      PHYSICAL EXAMINATION   Physical Exam  Vitals and nursing note reviewed.   Constitutional:       General: He is not in acute distress.     Appearance: Normal appearance. He is well-developed and well-groomed. He is not ill-appearing.   HENT:      Head: Normocephalic and atraumatic.      Right Ear: Tympanic membrane, ear canal and external ear normal.      Left Ear: Tympanic membrane, ear canal and external ear normal.      Mouth/Throat:      Mouth: Mucous membranes are moist.   Eyes:      Pupils: Pupils are equal, round, and reactive to light.   Neck:      Vascular: No carotid bruit.   Cardiovascular:      Rate and Rhythm: Normal rate and regular rhythm.      Pulses:           Posterior tibial pulses are 2+ on the right side and 2+ on the left side.      Heart sounds: Normal heart sounds.   Pulmonary:      Effort: Pulmonary effort is normal. No respiratory distress.      Breath sounds: Normal breath sounds and air entry.   Musculoskeletal:      Right lower leg: No edema.      Left lower leg: No edema.   Lymphadenopathy:      Cervical: No cervical adenopathy.   Skin:     General: Skin is warm and dry.   Neurological:      General: No focal deficit present.      Mental Status: He is alert and oriented to person, place, and time.   Psychiatric:         Attention and Perception: Attention normal.         Mood and Affect: Mood normal.         Behavior: Behavior normal.         Thought Content: Thought content normal.         Judgment: Judgment normal.

## 2024-01-17 NOTE — ASSESSMENT & PLAN NOTE
Appears moderately controlled  Reports drinking several cups coffee this am  BP today: 138/82  Continue Lisinopril-HCTZ 20-25  Advise periodic home pressure checks  F/U 6 month - sooner if BP elevated  Goal <130/80

## 2024-01-17 NOTE — ASSESSMENT & PLAN NOTE
Still uninsured. Hopeful to get insurance in next few months. If unable - he was advised to contact here and I will place social work referral to assist with any available resources.

## 2024-01-17 NOTE — ASSESSMENT & PLAN NOTE
Occasional flares  Recognizes triggers  Continue Omeprazole 20  Tums PRN  F/U if symptoms persist  Consider GI evaluation

## 2024-01-17 NOTE — ASSESSMENT & PLAN NOTE
Reports currently off medications.   Feels well.   Lungs clear today.   Encourage smoking cessation

## 2024-01-29 DIAGNOSIS — F90.0 ATTENTION DEFICIT HYPERACTIVITY DISORDER (ADHD), PREDOMINANTLY INATTENTIVE TYPE: ICD-10-CM

## 2024-01-29 RX ORDER — DEXTROAMPHETAMINE SACCHARATE, AMPHETAMINE ASPARTATE, DEXTROAMPHETAMINE SULFATE AND AMPHETAMINE SULFATE 5; 5; 5; 5 MG/1; MG/1; MG/1; MG/1
20 TABLET ORAL DAILY
Qty: 30 TABLET | Refills: 0 | Status: SHIPPED | OUTPATIENT
Start: 2024-01-29

## 2024-01-29 RX ORDER — DEXTROAMPHETAMINE SACCHARATE, AMPHETAMINE ASPARTATE, DEXTROAMPHETAMINE SULFATE AND AMPHETAMINE SULFATE 3.75; 3.75; 3.75; 3.75 MG/1; MG/1; MG/1; MG/1
15 TABLET ORAL
Qty: 30 TABLET | Refills: 0 | Status: SHIPPED | OUTPATIENT
Start: 2024-01-29

## 2024-02-26 DIAGNOSIS — F90.0 ATTENTION DEFICIT HYPERACTIVITY DISORDER (ADHD), PREDOMINANTLY INATTENTIVE TYPE: ICD-10-CM

## 2024-02-27 RX ORDER — DEXTROAMPHETAMINE SACCHARATE, AMPHETAMINE ASPARTATE, DEXTROAMPHETAMINE SULFATE AND AMPHETAMINE SULFATE 3.75; 3.75; 3.75; 3.75 MG/1; MG/1; MG/1; MG/1
15 TABLET ORAL
Qty: 30 TABLET | Refills: 0 | Status: SHIPPED | OUTPATIENT
Start: 2024-02-27

## 2024-02-27 RX ORDER — DEXTROAMPHETAMINE SACCHARATE, AMPHETAMINE ASPARTATE, DEXTROAMPHETAMINE SULFATE AND AMPHETAMINE SULFATE 5; 5; 5; 5 MG/1; MG/1; MG/1; MG/1
20 TABLET ORAL DAILY
Qty: 30 TABLET | Refills: 0 | Status: SHIPPED | OUTPATIENT
Start: 2024-02-27

## 2024-04-01 DIAGNOSIS — F90.0 ATTENTION DEFICIT HYPERACTIVITY DISORDER (ADHD), PREDOMINANTLY INATTENTIVE TYPE: ICD-10-CM

## 2024-04-02 RX ORDER — DEXTROAMPHETAMINE SACCHARATE, AMPHETAMINE ASPARTATE, DEXTROAMPHETAMINE SULFATE AND AMPHETAMINE SULFATE 5; 5; 5; 5 MG/1; MG/1; MG/1; MG/1
20 TABLET ORAL DAILY
Qty: 30 TABLET | Refills: 0 | Status: SHIPPED | OUTPATIENT
Start: 2024-04-02

## 2024-04-02 RX ORDER — DEXTROAMPHETAMINE SACCHARATE, AMPHETAMINE ASPARTATE, DEXTROAMPHETAMINE SULFATE AND AMPHETAMINE SULFATE 3.75; 3.75; 3.75; 3.75 MG/1; MG/1; MG/1; MG/1
15 TABLET ORAL
Qty: 30 TABLET | Refills: 0 | Status: SHIPPED | OUTPATIENT
Start: 2024-04-02

## 2024-04-29 DIAGNOSIS — F90.0 ATTENTION DEFICIT HYPERACTIVITY DISORDER (ADHD), PREDOMINANTLY INATTENTIVE TYPE: ICD-10-CM

## 2024-04-29 RX ORDER — DEXTROAMPHETAMINE SACCHARATE, AMPHETAMINE ASPARTATE, DEXTROAMPHETAMINE SULFATE AND AMPHETAMINE SULFATE 3.75; 3.75; 3.75; 3.75 MG/1; MG/1; MG/1; MG/1
15 TABLET ORAL
Qty: 30 TABLET | Refills: 0 | Status: SHIPPED | OUTPATIENT
Start: 2024-04-29

## 2024-04-29 RX ORDER — DEXTROAMPHETAMINE SACCHARATE, AMPHETAMINE ASPARTATE, DEXTROAMPHETAMINE SULFATE AND AMPHETAMINE SULFATE 5; 5; 5; 5 MG/1; MG/1; MG/1; MG/1
20 TABLET ORAL DAILY
Qty: 30 TABLET | Refills: 0 | Status: SHIPPED | OUTPATIENT
Start: 2024-04-29

## 2024-05-23 DIAGNOSIS — F90.0 ATTENTION DEFICIT HYPERACTIVITY DISORDER (ADHD), PREDOMINANTLY INATTENTIVE TYPE: ICD-10-CM

## 2024-05-28 RX ORDER — DEXTROAMPHETAMINE SACCHARATE, AMPHETAMINE ASPARTATE, DEXTROAMPHETAMINE SULFATE AND AMPHETAMINE SULFATE 5; 5; 5; 5 MG/1; MG/1; MG/1; MG/1
20 TABLET ORAL DAILY
Qty: 30 TABLET | Refills: 0 | Status: SHIPPED | OUTPATIENT
Start: 2024-05-28

## 2024-05-28 RX ORDER — DEXTROAMPHETAMINE SACCHARATE, AMPHETAMINE ASPARTATE, DEXTROAMPHETAMINE SULFATE AND AMPHETAMINE SULFATE 3.75; 3.75; 3.75; 3.75 MG/1; MG/1; MG/1; MG/1
15 TABLET ORAL
Qty: 30 TABLET | Refills: 0 | Status: SHIPPED | OUTPATIENT
Start: 2024-05-28

## 2024-06-27 DIAGNOSIS — F90.0 ATTENTION DEFICIT HYPERACTIVITY DISORDER (ADHD), PREDOMINANTLY INATTENTIVE TYPE: ICD-10-CM

## 2024-06-27 RX ORDER — DEXTROAMPHETAMINE SACCHARATE, AMPHETAMINE ASPARTATE, DEXTROAMPHETAMINE SULFATE AND AMPHETAMINE SULFATE 5; 5; 5; 5 MG/1; MG/1; MG/1; MG/1
20 TABLET ORAL DAILY
Qty: 30 TABLET | Refills: 0 | Status: SHIPPED | OUTPATIENT
Start: 2024-06-27

## 2024-06-27 RX ORDER — DEXTROAMPHETAMINE SACCHARATE, AMPHETAMINE ASPARTATE, DEXTROAMPHETAMINE SULFATE AND AMPHETAMINE SULFATE 3.75; 3.75; 3.75; 3.75 MG/1; MG/1; MG/1; MG/1
15 TABLET ORAL
Qty: 30 TABLET | Refills: 0 | Status: SHIPPED | OUTPATIENT
Start: 2024-06-27

## 2024-08-02 DIAGNOSIS — F90.0 ATTENTION DEFICIT HYPERACTIVITY DISORDER (ADHD), PREDOMINANTLY INATTENTIVE TYPE: ICD-10-CM

## 2024-08-02 RX ORDER — DEXTROAMPHETAMINE SACCHARATE, AMPHETAMINE ASPARTATE, DEXTROAMPHETAMINE SULFATE AND AMPHETAMINE SULFATE 5; 5; 5; 5 MG/1; MG/1; MG/1; MG/1
20 TABLET ORAL DAILY
Qty: 30 TABLET | Refills: 0 | Status: SHIPPED | OUTPATIENT
Start: 2024-08-02

## 2024-08-02 RX ORDER — DEXTROAMPHETAMINE SACCHARATE, AMPHETAMINE ASPARTATE, DEXTROAMPHETAMINE SULFATE AND AMPHETAMINE SULFATE 3.75; 3.75; 3.75; 3.75 MG/1; MG/1; MG/1; MG/1
15 TABLET ORAL
Qty: 30 TABLET | Refills: 0 | Status: SHIPPED | OUTPATIENT
Start: 2024-08-02

## 2025-02-25 ENCOUNTER — TELEPHONE (OUTPATIENT)
Age: 45
End: 2025-02-25

## 2025-02-25 NOTE — TELEPHONE ENCOUNTER
Pt called to request refill for Adderrall. He stated he has been out of medication since September but has not been able to schedule annual visit due to having no insurance at the time. He scheduled appt for next available with pcp on 3/28, is asking if refill can be sent until he make it into appt? Please kindly advise pt at [phone]. Thank you.

## 2025-02-25 NOTE — TELEPHONE ENCOUNTER
Please call patient.  He has not been seen in over a year.  In addition to his ADHD, he has hypertension.  I am assuming he may have been off those medications as well.  I will not refill a stimulant without evaluation.

## 2025-02-28 ENCOUNTER — TELEPHONE (OUTPATIENT)
Dept: FAMILY MEDICINE CLINIC | Facility: CLINIC | Age: 45
End: 2025-02-28

## 2025-03-03 ENCOUNTER — OFFICE VISIT (OUTPATIENT)
Dept: FAMILY MEDICINE CLINIC | Facility: CLINIC | Age: 45
End: 2025-03-03
Payer: COMMERCIAL

## 2025-03-03 VITALS
OXYGEN SATURATION: 98 % | HEART RATE: 106 BPM | BODY MASS INDEX: 29.15 KG/M2 | DIASTOLIC BLOOD PRESSURE: 96 MMHG | TEMPERATURE: 98.7 F | HEIGHT: 69 IN | WEIGHT: 196.8 LBS | SYSTOLIC BLOOD PRESSURE: 140 MMHG

## 2025-03-03 DIAGNOSIS — F90.0 ATTENTION DEFICIT HYPERACTIVITY DISORDER (ADHD), PREDOMINANTLY INATTENTIVE TYPE: Primary | ICD-10-CM

## 2025-03-03 DIAGNOSIS — I10 ESSENTIAL HYPERTENSION: ICD-10-CM

## 2025-03-03 DIAGNOSIS — Z13.220 SCREENING, LIPID: ICD-10-CM

## 2025-03-03 DIAGNOSIS — Z13.1 SCREENING FOR DIABETES MELLITUS: ICD-10-CM

## 2025-03-03 DIAGNOSIS — Z72.0 TOBACCO ABUSE: ICD-10-CM

## 2025-03-03 DIAGNOSIS — Z79.899 OTHER LONG TERM (CURRENT) DRUG THERAPY: ICD-10-CM

## 2025-03-03 DIAGNOSIS — J45.909 MODERATE ASTHMA WITHOUT COMPLICATION, UNSPECIFIED WHETHER PERSISTENT: ICD-10-CM

## 2025-03-03 PROCEDURE — 99214 OFFICE O/P EST MOD 30 MIN: CPT | Performed by: NURSE PRACTITIONER

## 2025-03-03 RX ORDER — LISINOPRIL AND HYDROCHLOROTHIAZIDE 20; 25 MG/1; MG/1
1 TABLET ORAL DAILY
Qty: 90 TABLET | Refills: 1 | Status: SHIPPED | OUTPATIENT
Start: 2025-03-03

## 2025-03-03 RX ORDER — DEXTROAMPHETAMINE SACCHARATE, AMPHETAMINE ASPARTATE, DEXTROAMPHETAMINE SULFATE AND AMPHETAMINE SULFATE 3.75; 3.75; 3.75; 3.75 MG/1; MG/1; MG/1; MG/1
15 TABLET ORAL
Qty: 30 TABLET | Refills: 0 | Status: SHIPPED | OUTPATIENT
Start: 2025-03-03

## 2025-03-03 RX ORDER — NICOTINE 21 MG/24HR
1 PATCH, TRANSDERMAL 24 HOURS TRANSDERMAL EVERY 24 HOURS
Qty: 28 PATCH | Refills: 0 | Status: SHIPPED | OUTPATIENT
Start: 2025-03-03

## 2025-03-03 RX ORDER — DEXTROAMPHETAMINE SACCHARATE, AMPHETAMINE ASPARTATE, DEXTROAMPHETAMINE SULFATE AND AMPHETAMINE SULFATE 5; 5; 5; 5 MG/1; MG/1; MG/1; MG/1
20 TABLET ORAL DAILY
Qty: 30 TABLET | Refills: 0 | Status: SHIPPED | OUTPATIENT
Start: 2025-03-03

## 2025-03-03 NOTE — PROGRESS NOTES
Name: Paresh Ortiz      : 1980      MRN: [id number]  Encounter Provider: NICHOLAS Lang  Encounter Date: 3/3/2025   Encounter department: [facility]  :  Assessment & Plan  Attention deficit hyperactivity disorder (ADHD), predominantly inattentive type  Patient has been off his Adderall since August; requesting to restart today  Reports significant increase in symptoms since he has not been taking it  Urine drug test completed today per [facility]'s protocol  Patient reports he is using marijuana-reports having a current medical marijuana card.  Denies any other illicit drug use  Urine collected/sent  Will restart Adderall today-but advise he start at a lower dose until we ensure his blood pressure is better controlled as below  He will take 10 mg AM 7.5 at lunch  We will plan to increase if tolerating well without negative side effect  He is agreeable with this plan    Orders:    [facility] All Prescribed Meds and Special Instructions    Amphetamines, Methamphetamines    Butalbital    Phenobarbital    Secobarbital    Alprazolam    Clonazepam    Diazepam    Lorazepam    Gabapentin    Pregabalin    Cocaine    Heroin    Buprenorphine    Levorphanol    Meperidine    Naltrexone    Fentanyl    Methadone    Oxycodone    Tapentadol    THC    Tramadol    Codeine, Hydrocodone, Hydropmorphone, Morphine    Bath Salts    Ethyl Glucuronide/Ethyl Sulfate    Kratom    Spice    Methylphenidate    Phentermine    Validity Oxidant    Validity Creatinine    Validity pH    Validity Specific    Xylazine Definitive Test    amphetamine-dextroamphetamine (ADDERALL, 20MG,) 20 mg tablet; Take 1 tablet (20 mg total) by mouth daily Max Daily Amount: 20 mg    amphetamine-dextroamphetamine (ADDERALL, 15MG,) 15 MG tablet; Take 1 tablet (15 mg total) by mouth daily after lunch Max Daily Amount: 15 mg    Essential hypertension  BP above goal today: 138/88  Reports home pressures averaging 140/90  Denies any  cardiovascular symptoms  Does admit to inconsistent compliance with his blood pressure medication  Rx refilled today-and he will take as directed daily  He will check his blood pressure daily over the next 2 weeks  Follow-up in office for BP recheck  He will bring his machine and readings to the appointment  We will dose adjust if indicated    Orders:    CBC and differential; Future    Comprehensive metabolic panel; Future    lisinopril-hydrochlorothiazide (PRINZIDE,ZESTORETIC) 20-25 MG per tablet; Take 1 tablet by mouth daily    Tobacco abuse  He continues smoking roughly 1 pack/day  He reports he is very motivated to quit  Will try nicotine patch again-Rx sent  (He reports unusual behavior on Chantix years ago)    Orders:    nicotine (NICODERM CQ) 21 mg/24 hr TD 24 hr patch; Place 1 patch on the skin over 24 hours every 24 hours    Screening for diabetes mellitus    Orders:    Comprehensive metabolic panel; Future    Other long term (current) drug therapy    Orders:    [facility] All Prescribed Meds and Special Instructions    Amphetamines, Methamphetamines    Butalbital    Phenobarbital    Secobarbital    Alprazolam    Clonazepam    Diazepam    Lorazepam    Gabapentin    Pregabalin    Cocaine    Heroin    Buprenorphine    Levorphanol    Meperidine    Naltrexone    Fentanyl    Methadone    Oxycodone    Tapentadol    THC    Tramadol    Codeine, Hydrocodone, Hydropmorphone, Morphine    Bath Salts    Ethyl Glucuronide/Ethyl Sulfate    Kratom    Spice    Methylphenidate    Phentermine    Validity Oxidant    Validity Creatinine    Validity pH    Validity Specific    Xylazine Definitive Test    CBC and differential; Future    Comprehensive metabolic panel; Future    Lipid panel; Future    Screening, lipid    Orders:    Lipid panel; Future    Moderate asthma without complication, unspecified whether persistent  Reports no recent use of inhalers  Denies shortness of breath or asthmatic flares                History of  "Present Illness   Overdue follow-up  Patient presents today for routine checkup.  Last seen January 2024.  He has not had Adderall since August, has he reports he was unable to come in for a physical due to loss of insurance.  He now has insurance and can update his labs.  He is requesting to restart his Adderall today.  We were also treating his blood pressure.  He reports he was inconsistent with his blood pressure medication until recently.  Home pressures have been averaging 140 over 90s.  He took his lisinopril/HCTZ this morning, but does admit he had not taken it for several days before that.  He denies any cardiovascular symptoms.  He does report frustration having not been on his Adderall.  Reports it has made a significant decline in the function of his day.  He is unable to concentrate.  He is unable to stay on task.  He has noted affecting his work.  He denies any depression or anxiety outside of the frustrations above      Review of Systems   Constitutional: Negative.    Respiratory: Negative.     Cardiovascular: Negative.    Gastrointestinal: Negative.    Neurological: Negative.    Psychiatric/Behavioral:  Positive for decreased concentration. Negative for self-injury, sleep disturbance and suicidal ideas. The patient is not nervous/anxious.        Objective   /96 (BP Location: Left arm, Patient Position: Sitting, Cuff Size: Adult)   Pulse (!) 106   Temp 98.7 °F (37.1 °C)   Ht 5' 9.29\" (1.76 m)   Wt 89.3 kg (196 lb 12.8 oz)   SpO2 98%   BMI 28.82 kg/m²      Physical Exam  Vitals and nursing note reviewed.   Constitutional:       General: He is not in acute distress.     Appearance: Normal appearance. He is well-developed. He is not ill-appearing.   Cardiovascular:      Rate and Rhythm: Normal rate and regular rhythm.      Heart sounds: Normal heart sounds.   Pulmonary:      Effort: Pulmonary effort is normal. No respiratory distress.      Breath sounds: Normal breath sounds and air entry. "   Skin:     General: Skin is warm and dry.   Neurological:      General: No focal deficit present.      Mental Status: He is alert and oriented to person, place, and time.   Psychiatric:         Attention and Perception: Attention normal.         Mood and Affect: Mood normal.         Behavior: Behavior normal.         Thought Content: Thought content normal.         Judgment: Judgment normal.

## 2025-03-03 NOTE — ASSESSMENT & PLAN NOTE
Patient has been off his Adderall since August; requesting to restart today  Reports significant increase in symptoms since he has not been taking it  Urine drug test completed today per [facility] protocol  Patient reports he is using marijuana-reports having a current medical marijuana card.  Denies any other illicit drug use  Urine collected/sent  Will restart Adderall today-but advise he start at a lower dose until we ensure his blood pressure is better controlled as below  He will take 10 mg AM 7.5 at lunch  We will plan to increase if tolerating well without negative side effect  He is agreeable with this plan    Orders:    [facility] All Prescribed Meds and Special Instructions    Amphetamines, Methamphetamines    Butalbital    Phenobarbital    Secobarbital    Alprazolam    Clonazepam    Diazepam    Lorazepam    Gabapentin    Pregabalin    Cocaine    Heroin    Buprenorphine    Levorphanol    Meperidine    Naltrexone    Fentanyl    Methadone    Oxycodone    Tapentadol    THC    Tramadol    Codeine, Hydrocodone, Hydropmorphone, Morphine    Bath Salts    Ethyl Glucuronide/Ethyl Sulfate    Kratom    Spice    Methylphenidate    Phentermine    Validity Oxidant    Validity Creatinine    Validity pH    Validity Specific    Xylazine Definitive Test    amphetamine-dextroamphetamine (ADDERALL, 20MG,) 20 mg tablet; Take 1 tablet (20 mg total) by mouth daily Max Daily Amount: 20 mg    amphetamine-dextroamphetamine (ADDERALL, 15MG,) 15 MG tablet; Take 1 tablet (15 mg total) by mouth daily after lunch Max Daily Amount: 15 mg

## 2025-03-03 NOTE — ASSESSMENT & PLAN NOTE
BP above goal today: 138/88  Reports home pressures averaging 140/90  Denies any cardiovascular symptoms  Does admit to inconsistent compliance with his blood pressure medication  Rx refilled today-and he will take as directed daily  He will check his blood pressure daily over the next 2 weeks  Follow-up in office for BP recheck  He will bring his machine and readings to the appointment  We will dose adjust if indicated    Orders:    CBC and differential; Future    Comprehensive metabolic panel; Future    lisinopril-hydrochlorothiazide (PRINZIDE,ZESTORETIC) 20-25 MG per tablet; Take 1 tablet by mouth daily

## 2025-03-05 LAB
4OH-XYLAZINE UR QL CFM: NEGATIVE NG/ML
6MAM UR QL CFM: NEGATIVE NG/ML
7AMINOCLONAZEPAM UR QL CFM: NEGATIVE NG/ML
A-OH ALPRAZ UR QL CFM: NEGATIVE NG/ML
ACCEPTABLE CREAT UR QL: NORMAL MG/DL
ACCEPTIBLE SP GR UR QL: NORMAL
AMPHET UR QL CFM: ABNORMAL NG/ML
BUPRENORPHINE UR QL CFM: NEGATIVE NG/ML
BUTALBITAL UR QL CFM: NEGATIVE NG/ML
BZE UR QL CFM: NEGATIVE NG/ML
CODEINE UR QL CFM: NEGATIVE NG/ML
EDDP UR QL CFM: NEGATIVE NG/ML
ETHYL GLUCURONIDE UR QL CFM: ABNORMAL NG/ML
ETHYL SULFATE UR QL SCN: ABNORMAL NG/ML
EUTYLONE UR QL: NEGATIVE NG/ML
FENTANYL UR QL CFM: NEGATIVE NG/ML
GLIADIN IGG SER IA-ACNC: NEGATIVE NG/ML
HYDROCODONE UR QL CFM: NEGATIVE NG/ML
HYDROMORPHONE UR QL CFM: NEGATIVE NG/ML
LORAZEPAM UR QL CFM: NEGATIVE NG/ML
ME-PHENIDATE UR QL CFM: NEGATIVE NG/ML
MEPERIDINE UR QL CFM: NEGATIVE NG/ML
METHADONE UR QL CFM: NEGATIVE NG/ML
METHAMPHET UR QL CFM: NEGATIVE NG/ML
MORPHINE UR QL CFM: NEGATIVE NG/ML
NALTREXONE UR QL CFM: NEGATIVE NG/ML
NITRITE UR QL: NORMAL UG/ML
NORBUPRENORPHINE UR QL CFM: NEGATIVE NG/ML
NORDIAZEPAM UR QL CFM: NEGATIVE NG/ML
NORFENTANYL UR QL CFM: NEGATIVE NG/ML
NORHYDROCODONE UR QL CFM: NEGATIVE NG/ML
NORMEPERIDINE UR QL CFM: NEGATIVE NG/ML
NOROXYCODONE UR QL CFM: NEGATIVE NG/ML
OXAZEPAM UR QL CFM: NEGATIVE NG/ML
OXYCODONE UR QL CFM: NEGATIVE NG/ML
OXYMORPHONE UR QL CFM: NEGATIVE NG/ML
PARA-FLUOROFENTANYL QUANTIFICATION: NORMAL NG/ML
PHENOBARB UR QL CFM: NEGATIVE NG/ML
RESULT ALL_PRESCRIBED MEDS AND SPECIAL INSTRUCTIONS: NORMAL
SECOBARBITAL UR QL CFM: NEGATIVE NG/ML
SL AMB 5F-ADB-M7 METABOLITE QUANTIFICATION: NEGATIVE NG/ML
SL AMB 7-OH-MITRAGYNINE (KRATOM ALKALOID) QUANTIFICATION: NEGATIVE NG/ML
SL AMB AB-FUBINACA-M3 METABOLITE QUANTIFICATION: NEGATIVE NG/ML
SL AMB ACETYL FENTANYL QUANTIFICATION: NORMAL NG/ML
SL AMB ACETYL NORFENTANYL QUANTIFICATION: NORMAL NG/ML
SL AMB ACRYL FENTANYL QUANTIFICATION: NORMAL NG/ML
SL AMB CARFENTANIL QUANTIFICATION: NORMAL NG/ML
SL AMB CTHC (MARIJUANA METABOLITE) QUANTIFICATION: NEGATIVE NG/ML
SL AMB DEXTRORPHAN (DEXTROMETHORPHAN METABOLITE) QUANT: NEGATIVE NG/ML
SL AMB GABAPENTIN QUANTIFICATION: NEGATIVE NG/ML
SL AMB JWH018 METABOLITE QUANTIFICATION: NEGATIVE NG/ML
SL AMB JWH073 METABOLITE QUANTIFICATION: NEGATIVE NG/ML
SL AMB MDMB-FUBINACA-M1 METABOLITE QUANTIFICATION: NEGATIVE NG/ML
SL AMB METHYLONE QUANTIFICATION: NEGATIVE NG/ML
SL AMB N-DESMETHYL-TRAMADOL QUANTIFICATION: NEGATIVE NG/ML
SL AMB PHENTERMINE QUANTIFICATION: NEGATIVE NG/ML
SL AMB PREGABALIN QUANTIFICATION: NEGATIVE NG/ML
SL AMB RCS4 METABOLITE QUANTIFICATION: NEGATIVE NG/ML
SL AMB RITALINIC ACID QUANTIFICATION: NEGATIVE NG/ML
SMOOTH MUSCLE AB TITR SER IF: NEGATIVE NG/ML
SPECIMEN DRAWN SERPL: NEGATIVE NG/ML
SPECIMEN PH ACCEPTABLE UR: NORMAL
TAPENTADOL UR QL CFM: NEGATIVE NG/ML
TEMAZEPAM UR QL CFM: NEGATIVE NG/ML
TRAMADOL UR QL CFM: NEGATIVE NG/ML
URATE/CREAT 24H UR: NEGATIVE NG/ML
XYLAZINE UR QL CFM: NEGATIVE NG/ML

## 2025-03-06 ENCOUNTER — RESULTS FOLLOW-UP (OUTPATIENT)
Dept: FAMILY MEDICINE CLINIC | Facility: CLINIC | Age: 45
End: 2025-03-06

## 2025-03-11 ENCOUNTER — APPOINTMENT (OUTPATIENT)
Dept: LAB | Facility: CLINIC | Age: 45
End: 2025-03-11
Payer: COMMERCIAL

## 2025-03-11 DIAGNOSIS — Z13.1 SCREENING FOR DIABETES MELLITUS: ICD-10-CM

## 2025-03-11 DIAGNOSIS — Z13.220 SCREENING, LIPID: ICD-10-CM

## 2025-03-11 DIAGNOSIS — I10 ESSENTIAL HYPERTENSION: ICD-10-CM

## 2025-03-11 DIAGNOSIS — Z79.899 OTHER LONG TERM (CURRENT) DRUG THERAPY: ICD-10-CM

## 2025-03-11 LAB
ALBUMIN SERPL BCG-MCNC: 4.7 G/DL (ref 3.5–5)
ALP SERPL-CCNC: 96 U/L (ref 34–104)
ALT SERPL W P-5'-P-CCNC: 20 U/L (ref 7–52)
ANION GAP SERPL CALCULATED.3IONS-SCNC: 7 MMOL/L (ref 4–13)
AST SERPL W P-5'-P-CCNC: 24 U/L (ref 13–39)
BASOPHILS # BLD AUTO: 0.09 THOUSANDS/ÂΜL (ref 0–0.1)
BASOPHILS NFR BLD AUTO: 1 % (ref 0–1)
BILIRUB SERPL-MCNC: 1.37 MG/DL (ref 0.2–1)
BUN SERPL-MCNC: 12 MG/DL (ref 5–25)
CALCIUM SERPL-MCNC: 9.9 MG/DL (ref 8.4–10.2)
CHLORIDE SERPL-SCNC: 96 MMOL/L (ref 96–108)
CHOLEST SERPL-MCNC: 228 MG/DL (ref ?–200)
CO2 SERPL-SCNC: 31 MMOL/L (ref 21–32)
CREAT SERPL-MCNC: 0.98 MG/DL (ref 0.6–1.3)
EOSINOPHIL # BLD AUTO: 0.44 THOUSAND/ÂΜL (ref 0–0.61)
EOSINOPHIL NFR BLD AUTO: 6 % (ref 0–6)
ERYTHROCYTE [DISTWIDTH] IN BLOOD BY AUTOMATED COUNT: 12.1 % (ref 11.6–15.1)
GFR SERPL CREATININE-BSD FRML MDRD: 92 ML/MIN/1.73SQ M
GLUCOSE P FAST SERPL-MCNC: 121 MG/DL (ref 65–99)
HCT VFR BLD AUTO: 50.6 % (ref 36.5–49.3)
HDLC SERPL-MCNC: 38 MG/DL
HGB BLD-MCNC: 17.6 G/DL (ref 12–17)
IMM GRANULOCYTES # BLD AUTO: 0.02 THOUSAND/UL (ref 0–0.2)
IMM GRANULOCYTES NFR BLD AUTO: 0 % (ref 0–2)
LDLC SERPL CALC-MCNC: 168 MG/DL (ref 0–100)
LYMPHOCYTES # BLD AUTO: 2.78 THOUSANDS/ÂΜL (ref 0.6–4.47)
LYMPHOCYTES NFR BLD AUTO: 35 % (ref 14–44)
MCH RBC QN AUTO: 31.5 PG (ref 26.8–34.3)
MCHC RBC AUTO-ENTMCNC: 34.8 G/DL (ref 31.4–37.4)
MCV RBC AUTO: 91 FL (ref 82–98)
MONOCYTES # BLD AUTO: 0.9 THOUSAND/ÂΜL (ref 0.17–1.22)
MONOCYTES NFR BLD AUTO: 11 % (ref 4–12)
NEUTROPHILS # BLD AUTO: 3.67 THOUSANDS/ÂΜL (ref 1.85–7.62)
NEUTS SEG NFR BLD AUTO: 47 % (ref 43–75)
NONHDLC SERPL-MCNC: 190 MG/DL
NRBC BLD AUTO-RTO: 0 /100 WBCS
PLATELET # BLD AUTO: 305 THOUSANDS/UL (ref 149–390)
PMV BLD AUTO: 9.9 FL (ref 8.9–12.7)
POTASSIUM SERPL-SCNC: 4.7 MMOL/L (ref 3.5–5.3)
PROT SERPL-MCNC: 7.6 G/DL (ref 6.4–8.4)
RBC # BLD AUTO: 5.58 MILLION/UL (ref 3.88–5.62)
SODIUM SERPL-SCNC: 134 MMOL/L (ref 135–147)
TRIGL SERPL-MCNC: 111 MG/DL (ref ?–150)
WBC # BLD AUTO: 7.9 THOUSAND/UL (ref 4.31–10.16)

## 2025-03-11 PROCEDURE — 80061 LIPID PANEL: CPT

## 2025-03-11 PROCEDURE — 85025 COMPLETE CBC W/AUTO DIFF WBC: CPT

## 2025-03-11 PROCEDURE — 80053 COMPREHEN METABOLIC PANEL: CPT

## 2025-03-11 PROCEDURE — 36415 COLL VENOUS BLD VENIPUNCTURE: CPT

## 2025-03-18 ENCOUNTER — OFFICE VISIT (OUTPATIENT)
Dept: FAMILY MEDICINE CLINIC | Facility: CLINIC | Age: 45
End: 2025-03-18
Payer: COMMERCIAL

## 2025-03-18 VITALS
OXYGEN SATURATION: 95 % | HEIGHT: 70 IN | SYSTOLIC BLOOD PRESSURE: 128 MMHG | TEMPERATURE: 98.7 F | HEART RATE: 96 BPM | BODY MASS INDEX: 27.8 KG/M2 | DIASTOLIC BLOOD PRESSURE: 78 MMHG | WEIGHT: 194.2 LBS

## 2025-03-18 DIAGNOSIS — E78.2 MIXED HYPERLIPIDEMIA: ICD-10-CM

## 2025-03-18 DIAGNOSIS — J45.909 MODERATE ASTHMA WITHOUT COMPLICATION, UNSPECIFIED WHETHER PERSISTENT: ICD-10-CM

## 2025-03-18 DIAGNOSIS — Z00.00 ANNUAL PHYSICAL EXAM: Primary | ICD-10-CM

## 2025-03-18 DIAGNOSIS — K21.9 GASTROESOPHAGEAL REFLUX DISEASE: ICD-10-CM

## 2025-03-18 DIAGNOSIS — Z79.899 LONG-TERM CURRENT USE OF STIMULANT: ICD-10-CM

## 2025-03-18 DIAGNOSIS — F90.0 ATTENTION DEFICIT HYPERACTIVITY DISORDER (ADHD), PREDOMINANTLY INATTENTIVE TYPE: ICD-10-CM

## 2025-03-18 DIAGNOSIS — Z11.59 NEED FOR HEPATITIS C SCREENING TEST: ICD-10-CM

## 2025-03-18 DIAGNOSIS — K21.9 GASTROESOPHAGEAL REFLUX DISEASE, UNSPECIFIED WHETHER ESOPHAGITIS PRESENT: ICD-10-CM

## 2025-03-18 DIAGNOSIS — I10 ESSENTIAL HYPERTENSION: ICD-10-CM

## 2025-03-18 DIAGNOSIS — R73.01 ELEVATED FASTING BLOOD SUGAR: ICD-10-CM

## 2025-03-18 DIAGNOSIS — Z12.11 SCREEN FOR COLON CANCER: ICD-10-CM

## 2025-03-18 DIAGNOSIS — Z72.0 TOBACCO ABUSE: ICD-10-CM

## 2025-03-18 LAB — SL AMB POCT HEMOGLOBIN AIC: 5.3 (ref ?–6.5)

## 2025-03-18 PROCEDURE — 99213 OFFICE O/P EST LOW 20 MIN: CPT | Performed by: NURSE PRACTITIONER

## 2025-03-18 PROCEDURE — 99396 PREV VISIT EST AGE 40-64: CPT | Performed by: NURSE PRACTITIONER

## 2025-03-18 PROCEDURE — 83036 HEMOGLOBIN GLYCOSYLATED A1C: CPT | Performed by: NURSE PRACTITIONER

## 2025-03-18 PROCEDURE — 93000 ELECTROCARDIOGRAM COMPLETE: CPT | Performed by: NURSE PRACTITIONER

## 2025-03-18 RX ORDER — OMEPRAZOLE 20 MG/1
20 CAPSULE, DELAYED RELEASE ORAL DAILY
Qty: 90 CAPSULE | Refills: 1 | Status: SHIPPED | OUTPATIENT
Start: 2025-03-18

## 2025-03-18 RX ORDER — FLUTICASONE FUROATE AND VILANTEROL 100; 25 UG/1; UG/1
1 POWDER RESPIRATORY (INHALATION) DAILY
Qty: 28 EACH | Refills: 5 | Status: SHIPPED | OUTPATIENT
Start: 2025-03-18 | End: 2025-03-20 | Stop reason: SDUPTHER

## 2025-03-18 NOTE — ASSESSMENT & PLAN NOTE
Reports much improved since restarting Adderall  He slowly titrated up as we discussed at his last visit  He is now on his full dose 20 a.m. 15 around lunch

## 2025-03-18 NOTE — ASSESSMENT & PLAN NOTE
Lungs wheezy throughout-but patient denies any respiratory complaints  SpO2 99%  Do encourage she restart treatment (he has been off for several months-exact time uncertain)  Rx for Breo sent to the pharmacy (he has used well in the past without issue)    Orders:    Fluticasone Furoate-Vilanterol (Breo Ellipta) 100-25 mcg/actuation inhaler; Inhale 1 puff daily Rinse mouth after use.

## 2025-03-18 NOTE — ASSESSMENT & PLAN NOTE
Continues smoking 1 to 2 packs/day  Cessation strongly encouraged  Discussed increased risk factors (comorbidities)  ASCVD risk 11.2  We will continue to monitor efforts at subsequent visits  Plan lung screen ct at age 49

## 2025-03-18 NOTE — ASSESSMENT & PLAN NOTE
Stable today: 128/78  Denies any cardiovascular symptoms  Advised periodic home pressure checks  Continue current treatment: Lisinopril-HCTZ 20-25    Orders:    Comprehensive metabolic panel; Future    CBC and differential; Future

## 2025-03-18 NOTE — PROGRESS NOTES
Adult Annual Physical  Name: Paresh Ortiz      : 1980      MRN: [id number]  Encounter Provider: NICHOLAS Lang  Encounter Date: 3/18/2025   Encounter department: [facility]    Assessment & Plan  Screen for colon cancer    Orders:    Cologuard    Essential hypertension  Stable today: 128/78  Denies any cardiovascular symptoms  Advised periodic home pressure checks  Continue current treatment: Lisinopril-HCTZ 20-25    Orders:    Comprehensive metabolic panel; Future    CBC and differential; Future    Moderate asthma without complication, unspecified whether persistent  Lungs wheezy throughout-but patient denies any respiratory complaints  SpO2 99%  Do encourage she restart treatment (he has been off for several months-exact time uncertain)  Rx for Breo sent to the pharmacy (he has used well in the past without issue)    Orders:    Fluticasone Furoate-Vilanterol (Breo Ellipta) 100-25 mcg/actuation inhaler; Inhale 1 puff daily Rinse mouth after use.    Gastroesophageal reflux disease, unspecified whether esophagitis present  Notes occasional increase of symptoms  Restart omeprazole       Attention deficit hyperactivity disorder (ADHD), predominantly inattentive type  Reports much improved since restarting Adderall  He slowly titrated up as we discussed at his last visit  He is now on his full dose 20 a.m. 15 around lunch       Tobacco abuse  Continues smoking 1 to 2 packs/day  Cessation strongly encouraged  Discussed increased risk factors (comorbidities)  ASCVD risk 11.2  We will continue to monitor efforts at subsequent visits  Plan lung screen ct at age 49       Elevated fasting blood sugar  Sugar elevated on recent CMP  A1c 5.3    Orders:    POCT hemoglobin A1c    Mixed hyperlipidemia  Lipid significantly elevated  Total 228;   Patient does admit to poor diet; low formal exercise  He would like to try lifestyle change over the next few months  Is agreeable to statin if  he cannot naturally reduce his numbers    Orders:    Lipid panel; Future    Long-term current use of stimulant  Long-term Adderall use  EKG in office today: Normal sinus rhythm; possible left atrial enlargement; borderline ECG  Asymptomatic    Orders:    POCT ECG    Gastroesophageal reflux disease    Orders:    omeprazole (PriLOSEC) 20 mg delayed release capsule; Take 1 capsule (20 mg total) by mouth daily    Need for hepatitis C screening test    Orders:    Hepatitis C Antibody; Future    Annual physical exam         Preventive Screenings:  - Diabetes Screening: screening up-to-date  - Cholesterol Screening: screening not indicated and has hyperlipidemia   - Hepatitis C screening: orders placed   - HIV screening: orders placed   - Colon cancer screening: orders placed   - Lung cancer screening: screening not indicated   - Prostate cancer screening: screening not indicated     Cologuard ordered-will be his baseline colon cancer screen  Lab work orders to complete prior to next follow-up  Immunizations: Tdap up-to-date      Immunizations:  - Immunizations due: Influenza and Prevnar 20  - The patient declines recommended vaccines currently despite my recommendations      Counseling/Anticipatory Guidance:  - Alcohol: discussed moderation in alcohol intake and recommendations for healthy alcohol use.   - Drug use: discussed harms of illicit drug use and how it can negatively impact mental/physical health.   - Tobacco use: discussed harms of tobacco use and management options for quitting.   - Dental health: discussed importance of regular tooth brushing, flossing, and dental visits.   - Sexual health: discussed sexually transmitted diseases, partner selection, use of condoms, avoidance of unintended pregnancy, and contraceptive alternatives.   - Diet: discussed recommendations for a healthy/well-balanced diet.   - Exercise: the importance of regular exercise/physical activity was discussed. Recommend exercise 3-5 times  "per week for at least 30 minutes.   - Injury prevention: discussed safety/seat belts, safety helmets, smoke detectors, carbon monoxide detectors, and smoking near bedding or upholstery.          History of Present Illness     Adult Annual Physical:  Patient presents for annual physical. noah 44-year-old presents today for chronic conditions check and annual wellness exam.     Diet and Physical Activity:  - Diet/Nutrition: poor diet. Encourage healthy diet; Mediterranean style; low fat/carb/cholesterol-low cholesterol education provided at end of summary visit  - Exercise: walking. Encouraged daily exercise-20/30 minutes of cardiovascular and/or strengthening daily    General Health:  - Sleep: sleeps well. Encourage good sleep hygiene  - Hearing: normal hearing bilateral ears.  - Vision: no vision problems and most recent eye exam < 1 year ago.  - Dental: regular dental visits.     Health:  - History of STDs: no.   - Urinary symptoms: none.     Advanced Care Planning:  - Has an advanced directive?: no    - Has a durable medical POA?: no    - ACP document given to patient?: no      Review of Systems   Constitutional: Negative.    HENT: Negative.     Respiratory: Negative.     Cardiovascular: Negative.    Gastrointestinal: Negative.    Genitourinary: Negative.    Musculoskeletal: Negative.    Skin: Negative.    Neurological: Negative.    Psychiatric/Behavioral: Negative.           Objective   /78   Pulse 96   Temp 98.7 °F (37.1 °C)   Ht 5' 9.5\" (1.765 m)   Wt 88.1 kg (194 lb 3.2 oz)   SpO2 95%   BMI 28.27 kg/m²     Physical Exam  Vitals and nursing note reviewed.   Constitutional:       General: He is not in acute distress.     Appearance: Normal appearance. He is well-developed and well-groomed. He is not ill-appearing.   HENT:      Head: Normocephalic.      Right Ear: Tympanic membrane, ear canal and external ear normal.      Left Ear: Tympanic membrane, ear canal and external ear normal.      Nose: " Nose normal.      Mouth/Throat:      Mouth: Mucous membranes are moist.      Pharynx: Oropharynx is clear.   Eyes:      Conjunctiva/sclera: Conjunctivae normal.      Pupils: Pupils are equal, round, and reactive to light.   Neck:      Thyroid: No thyromegaly.      Vascular: No carotid bruit.   Cardiovascular:      Rate and Rhythm: Normal rate and regular rhythm.      Pulses:           Posterior tibial pulses are 2+ on the right side and 2+ on the left side.      Heart sounds: Normal heart sounds.   Pulmonary:      Effort: Pulmonary effort is normal. No respiratory distress.      Breath sounds: Normal breath sounds and air entry.   Musculoskeletal:      Right lower leg: No edema.      Left lower leg: No edema.   Lymphadenopathy:      Cervical: No cervical adenopathy.   Skin:     General: Skin is warm and dry.   Neurological:      General: No focal deficit present.      Mental Status: He is alert and oriented to person, place, and time.   Psychiatric:         Attention and Perception: Attention normal.         Mood and Affect: Mood normal.         Behavior: Behavior normal.         Thought Content: Thought content normal.         Judgment: Judgment normal.

## 2025-03-19 ENCOUNTER — TELEPHONE (OUTPATIENT)
Age: 45
End: 2025-03-19

## 2025-03-19 NOTE — TELEPHONE ENCOUNTER
PA for Fluticasone Furoate-Vilanterol 100-25mcg/act SUBMITTED to [employer]    via    []CMM-KEY:   [x]Surescripts  []Availity-Auth ID # NDC #   []Faxed to plan   []Other website   []Phone call Case ID #     [x]PA sent as URGENT    All office notes, labs and other pertaining documents and studies sent. Clinical questions answered. Awaiting determination from insurance company.     Turnaround time for your insurance to make a decision on your Prior Authorization can take 7-21 business days.

## 2025-03-20 DIAGNOSIS — J45.909 MODERATE ASTHMA WITHOUT COMPLICATION, UNSPECIFIED WHETHER PERSISTENT: Primary | ICD-10-CM

## 2025-03-20 RX ORDER — FLUTICASONE PROPIONATE AND SALMETEROL 100; 50 UG/1; UG/1
1 POWDER RESPIRATORY (INHALATION) 2 TIMES DAILY
Qty: 60 BLISTER | Refills: 0 | Status: SHIPPED | OUTPATIENT
Start: 2025-03-20

## 2025-03-20 RX ORDER — FLUTICASONE PROPIONATE AND SALMETEROL 250; 50 UG/1; UG/1
1 POWDER RESPIRATORY (INHALATION) 2 TIMES DAILY
Qty: 60 BLISTER | Refills: 0 | Status: SHIPPED | OUTPATIENT
Start: 2025-03-20 | End: 2025-03-20 | Stop reason: CLARIF

## 2025-03-20 NOTE — TELEPHONE ENCOUNTER
PA for Fluticasone Furoate-Vilanterol 100-25mcg/act  DENIED    Reason:(Screenshot if applicable)        Message sent to office clinical pool Yes    Denial letter scanned into Media Yes    Appeal started No (Provider will need to decide if appeal is warranted and send clinical documentation to Prior Authorization Team for initiation.)    **Please follow up with your patient regarding denial and next steps**

## 2025-03-27 ENCOUNTER — RESULTS FOLLOW-UP (OUTPATIENT)
Dept: FAMILY MEDICINE CLINIC | Facility: CLINIC | Age: 45
End: 2025-03-27

## 2025-03-27 LAB — COLOGUARD RESULT REPORTABLE: NEGATIVE

## 2025-04-04 DIAGNOSIS — F90.0 ATTENTION DEFICIT HYPERACTIVITY DISORDER (ADHD), PREDOMINANTLY INATTENTIVE TYPE: ICD-10-CM

## 2025-04-04 RX ORDER — DEXTROAMPHETAMINE SACCHARATE, AMPHETAMINE ASPARTATE, DEXTROAMPHETAMINE SULFATE AND AMPHETAMINE SULFATE 3.75; 3.75; 3.75; 3.75 MG/1; MG/1; MG/1; MG/1
15 TABLET ORAL
Qty: 30 TABLET | Refills: 0 | Status: SHIPPED | OUTPATIENT
Start: 2025-04-04

## 2025-04-04 RX ORDER — DEXTROAMPHETAMINE SACCHARATE, AMPHETAMINE ASPARTATE, DEXTROAMPHETAMINE SULFATE AND AMPHETAMINE SULFATE 5; 5; 5; 5 MG/1; MG/1; MG/1; MG/1
20 TABLET ORAL DAILY
Qty: 30 TABLET | Refills: 0 | Status: SHIPPED | OUTPATIENT
Start: 2025-04-04

## 2025-05-04 DIAGNOSIS — F90.0 ATTENTION DEFICIT HYPERACTIVITY DISORDER (ADHD), PREDOMINANTLY INATTENTIVE TYPE: ICD-10-CM

## 2025-05-05 DIAGNOSIS — F90.0 ATTENTION DEFICIT HYPERACTIVITY DISORDER (ADHD), PREDOMINANTLY INATTENTIVE TYPE: ICD-10-CM

## 2025-05-05 RX ORDER — DEXTROAMPHETAMINE SACCHARATE, AMPHETAMINE ASPARTATE, DEXTROAMPHETAMINE SULFATE AND AMPHETAMINE SULFATE 3.75; 3.75; 3.75; 3.75 MG/1; MG/1; MG/1; MG/1
15 TABLET ORAL
Qty: 30 TABLET | Refills: 0 | Status: SHIPPED | OUTPATIENT
Start: 2025-05-05

## 2025-05-05 RX ORDER — DEXTROAMPHETAMINE SACCHARATE, AMPHETAMINE ASPARTATE, DEXTROAMPHETAMINE SULFATE AND AMPHETAMINE SULFATE 3.75; 3.75; 3.75; 3.75 MG/1; MG/1; MG/1; MG/1
15 TABLET ORAL
Qty: 30 TABLET | Refills: 0 | OUTPATIENT
Start: 2025-05-05

## 2025-05-05 RX ORDER — DEXTROAMPHETAMINE SACCHARATE, AMPHETAMINE ASPARTATE, DEXTROAMPHETAMINE SULFATE AND AMPHETAMINE SULFATE 5; 5; 5; 5 MG/1; MG/1; MG/1; MG/1
20 TABLET ORAL DAILY
Qty: 30 TABLET | Refills: 0 | OUTPATIENT
Start: 2025-05-05

## 2025-05-05 RX ORDER — DEXTROAMPHETAMINE SACCHARATE, AMPHETAMINE ASPARTATE, DEXTROAMPHETAMINE SULFATE AND AMPHETAMINE SULFATE 5; 5; 5; 5 MG/1; MG/1; MG/1; MG/1
20 TABLET ORAL DAILY
Qty: 30 TABLET | Refills: 0 | Status: SHIPPED | OUTPATIENT
Start: 2025-05-05

## 2025-06-04 DIAGNOSIS — F90.0 ATTENTION DEFICIT HYPERACTIVITY DISORDER (ADHD), PREDOMINANTLY INATTENTIVE TYPE: ICD-10-CM

## 2025-06-04 RX ORDER — DEXTROAMPHETAMINE SACCHARATE, AMPHETAMINE ASPARTATE, DEXTROAMPHETAMINE SULFATE AND AMPHETAMINE SULFATE 5; 5; 5; 5 MG/1; MG/1; MG/1; MG/1
20 TABLET ORAL DAILY
Qty: 30 TABLET | Refills: 0 | Status: SHIPPED | OUTPATIENT
Start: 2025-06-04

## 2025-06-04 RX ORDER — DEXTROAMPHETAMINE SACCHARATE, AMPHETAMINE ASPARTATE, DEXTROAMPHETAMINE SULFATE AND AMPHETAMINE SULFATE 3.75; 3.75; 3.75; 3.75 MG/1; MG/1; MG/1; MG/1
15 TABLET ORAL
Qty: 30 TABLET | Refills: 0 | Status: SHIPPED | OUTPATIENT
Start: 2025-06-04

## 2025-07-03 DIAGNOSIS — F90.0 ATTENTION DEFICIT HYPERACTIVITY DISORDER (ADHD), PREDOMINANTLY INATTENTIVE TYPE: ICD-10-CM

## 2025-07-03 RX ORDER — DEXTROAMPHETAMINE SACCHARATE, AMPHETAMINE ASPARTATE, DEXTROAMPHETAMINE SULFATE AND AMPHETAMINE SULFATE 3.75; 3.75; 3.75; 3.75 MG/1; MG/1; MG/1; MG/1
15 TABLET ORAL
Qty: 30 TABLET | Refills: 0 | Status: SHIPPED | OUTPATIENT
Start: 2025-07-03

## 2025-07-03 RX ORDER — DEXTROAMPHETAMINE SACCHARATE, AMPHETAMINE ASPARTATE, DEXTROAMPHETAMINE SULFATE AND AMPHETAMINE SULFATE 5; 5; 5; 5 MG/1; MG/1; MG/1; MG/1
20 TABLET ORAL DAILY
Qty: 30 TABLET | Refills: 0 | Status: SHIPPED | OUTPATIENT
Start: 2025-07-03

## 2025-08-05 DIAGNOSIS — F90.0 ATTENTION DEFICIT HYPERACTIVITY DISORDER (ADHD), PREDOMINANTLY INATTENTIVE TYPE: ICD-10-CM

## 2025-08-05 RX ORDER — DEXTROAMPHETAMINE SACCHARATE, AMPHETAMINE ASPARTATE, DEXTROAMPHETAMINE SULFATE AND AMPHETAMINE SULFATE 5; 5; 5; 5 MG/1; MG/1; MG/1; MG/1
20 TABLET ORAL DAILY
Qty: 30 TABLET | Refills: 0 | Status: SHIPPED | OUTPATIENT
Start: 2025-08-05

## 2025-08-05 RX ORDER — DEXTROAMPHETAMINE SACCHARATE, AMPHETAMINE ASPARTATE, DEXTROAMPHETAMINE SULFATE AND AMPHETAMINE SULFATE 3.75; 3.75; 3.75; 3.75 MG/1; MG/1; MG/1; MG/1
15 TABLET ORAL
Qty: 30 TABLET | Refills: 0 | Status: SHIPPED | OUTPATIENT
Start: 2025-08-05